# Patient Record
Sex: FEMALE | Race: BLACK OR AFRICAN AMERICAN | Employment: FULL TIME | RURAL
[De-identification: names, ages, dates, MRNs, and addresses within clinical notes are randomized per-mention and may not be internally consistent; named-entity substitution may affect disease eponyms.]

---

## 2017-01-18 ENCOUNTER — TELEPHONE (OUTPATIENT)
Dept: FAMILY MEDICINE CLINIC | Age: 37
End: 2017-01-18

## 2017-01-18 NOTE — TELEPHONE ENCOUNTER
Pt walked in and states has a bite with rash and wants it looked at made aware call for appt tomor but asking for now.

## 2017-01-19 NOTE — TELEPHONE ENCOUNTER
Looked at pt's bites/rash. Dr. Dereje Ferrer had expressed he could not see any more pts that was a non-emergency due to the time. It was 4:25 pm. Pt was offered all the other BS office numbers if she wanted to call. She was offered an appointment 1/19/2017 but said she would call other offices instead.

## 2017-03-06 ENCOUNTER — TELEPHONE (OUTPATIENT)
Dept: FAMILY MEDICINE CLINIC | Age: 37
End: 2017-03-06

## 2017-03-06 NOTE — TELEPHONE ENCOUNTER
Called pt and she is going to another office for treatment of allergy systems and ear ache. She stated she has \"tired everything\".

## 2017-03-06 NOTE — TELEPHONE ENCOUNTER
Pt would like to be worked been battleing for 1 week of ear pain runny nose .  i did make her aware she can call in a am to get same day appt but wanted to ask about today

## 2017-04-13 ENCOUNTER — OFFICE VISIT (OUTPATIENT)
Dept: FAMILY MEDICINE CLINIC | Age: 37
End: 2017-04-13

## 2017-04-13 ENCOUNTER — TELEPHONE (OUTPATIENT)
Dept: FAMILY MEDICINE CLINIC | Age: 37
End: 2017-04-13

## 2017-04-13 VITALS
DIASTOLIC BLOOD PRESSURE: 70 MMHG | WEIGHT: 150 LBS | OXYGEN SATURATION: 99 % | BODY MASS INDEX: 24.99 KG/M2 | TEMPERATURE: 97.9 F | HEIGHT: 65 IN | HEART RATE: 90 BPM | SYSTOLIC BLOOD PRESSURE: 118 MMHG | RESPIRATION RATE: 18 BRPM

## 2017-04-13 DIAGNOSIS — Z91.09 ENVIRONMENTAL ALLERGIES: Primary | ICD-10-CM

## 2017-04-13 DIAGNOSIS — F43.23 ADJUSTMENT REACTION WITH ANXIETY AND DEPRESSION: ICD-10-CM

## 2017-04-13 DIAGNOSIS — F40.10 SOCIAL ANXIETY DISORDER: ICD-10-CM

## 2017-04-13 RX ORDER — DULOXETIN HYDROCHLORIDE 20 MG/1
20 CAPSULE, DELAYED RELEASE ORAL DAILY
Qty: 30 CAP | Refills: 0 | Status: SHIPPED | OUTPATIENT
Start: 2017-04-13 | End: 2017-05-09

## 2017-04-13 RX ORDER — SERTRALINE HYDROCHLORIDE 50 MG/1
50 TABLET, FILM COATED ORAL DAILY
Qty: 30 TAB | Refills: 8 | Status: CANCELLED | OUTPATIENT
Start: 2017-04-13

## 2017-04-13 NOTE — MR AVS SNAPSHOT
Visit Information Date & Time Provider Department Dept. Phone Encounter #  
 4/13/2017  2:30 PM Sophia Swanson NP [facility] [address] [facility] [phone] [id number] Your Appointments 5/9/2017  4:00 PM  
COMPLETE PHYSICAL with Sophia Swanson NP  
[address] [place] ([address]) Appt Note: [address] [facility] [address] [address]  
[address] [facility] [address] [address] Upcoming Health Maintenance Date Due DTaP/Tdap/Td series (1 - Tdap) 12/21/1999 INFLUENZA AGE 9 TO ADULT 8/1/2016 PAP AKA CERVICAL CYTOLOGY 10/29/2018 Allergies as of 4/13/2017  Review Complete On: 4/13/2017 By: Sophia Swanson NP Severity Noted Reaction Type Reactions Azithromycin High 09/12/2012   Systemic Swelling  
 z pack Current Immunizations  Reviewed on 10/7/2012 Name Date Influenza Vaccine Split  Deferred (Patient Refused) Not reviewed this visit You Were Diagnosed With   
  
 Codes Comments Environmental allergies    -  Primary ICD-10-CM: Z91.09 
ICD-9-CM: V15.09 Adjustment reaction with anxiety and depression     ICD-10-CM: F43.23 
ICD-9-CM: 309.28 Social anxiety disorder     ICD-10-CM: F40.10 ICD-9-CM: 300.23 Vitals BP Pulse Temp Resp Height(growth percentile) Weight(growth percentile) 118/70 (BP 1 Location: Left arm, BP Patient Position: Sitting) 90 97.9 °F (36.6 °C) (Temporal) 18 5' 5\" (1.651 m) 150 lb (68 kg) LMP SpO2 BMI OB Status Smoking Status 04/13/2017 99% 24.96 kg/m2 Having regular periods Passive Smoke Exposure - Never Smoker Vitals History BMI and BSA Data Body Mass Index Body Surface Area 24.96 kg/m 2 1.77 m 2 Preferred Pharmacy Pharmacy Name Phone [address], [facility] [facility] [phone] Your Updated Medication List  
  
   
 This list is accurate as of: 4/13/17  4:12 PM.  Always use your most recent med list.  
  
  
  
  
 DULoxetine 20 mg capsule Commonly known as:  CYMBALTA Take 1 Cap by mouth daily. Indications: ANXIETY WITH DEPRESSION  
  
 fluticasone 50 mcg/actuation nasal spray Commonly known as:  Kanchan [place] 2 Sprays by Both Nostrils route daily. valACYclovir 1 gram tablet Commonly known as:  VALTREX  
2 tablets initially then 2 tablets q 12 hours as needed for lesion  Indications: HERPES LABIALIS  
  
 WOMEN'S ONE DAILY PO Take  by mouth daily. Prescriptions Sent to Pharmacy Refills DULoxetine (CYMBALTA) 20 mg capsule 0 Sig: Take 1 Cap by mouth daily. Indications: ANXIETY WITH DEPRESSION Class: Normal  
 Pharmacy: [address] Anjel Vickers Ph #: [phone] Route: Oral  
  
We Performed the Following REFERRAL TO ALLERGY [REF5 Custom] Comments:  
 Allergies failed OTC tx Referral Information Referral ID Referred By Referred To  
  
 [id number] ROMAINE, [facility] [place] [facility]. [address] [place] [address] Phone: [phone] Fax: [phone] Visits Status Start Date End Date 1 New Request 4/13/17 4/13/18 If your referral has a status of pending review or denied, additional information will be sent to support the outcome of this decision. Introducing [facility] & HEALTH SERVICES! Raú[employer] Olson introduces [employer] patient portal. Now you can access parts of your medical record, email your doctor's office, and request medication refills online. 1. In your internet browser, go to [email or web address] - Pay[employer] Anytime | Anywhere. [email or web address] - [employer] Anytime | Anywhere 2. Click on the First Time User? Click Here link in the Sign In box. You will see the New Member Sign Up page. 3. Enter your [employer] Access Code exactly as it appears below. You will not need to use this code after youve completed the sign-up process.  If you do not sign up before the expiration date, you must request a new code. · [employer] Access Code: [email or web address] Expires: 7/12/2017  4:12 PM 
 
4. Enter the last four digits of your Social Security Number (xxxx) and Date of Birth (mm/dd/yyyy) as indicated and click Submit. You will be taken to the next sign-up page. 5. Create a [employer] ID. This will be your [employer] login ID and cannot be changed, so think of one that is secure and easy to remember. 6. Create a [employer] password. You can change your password at any time. 7. Enter your Password Reset Question and Answer. This can be used at a later time if you forget your password. 8. Enter your e-mail address. You will receive e-mail notification when new information is available in [address] 9. Click Sign Up. You can now view and download portions of your medical record. 10. Click the Download Summary menu link to download a portable copy of your medical information. If you have questions, please visit the Frequently Asked Questions section of the [employer] website. Remember, [employer] is NOT to be used for urgent needs. For medical emergencies, dial 911. Now available from your iPhone and Android! Please provide this summary of care documentation to your next provider. Your primary care clinician is listed as Saulo Thompson. If you have any questions after today's visit, please call [phone].

## 2017-04-14 NOTE — PROGRESS NOTES
Subjective:     Joeann Favre is a 43 y.o. female who presents today with the following:  Chief Complaint   [facility]   [facility]   seasonal allergy symptoms. Watery ,, sneezing and cough. runny nose itchy eyes  No fever chills no exposure to strep or mono. No respiratory distress no history of asthma. Some ear fullness no pain. Has maxillary tenderness, anterior lymphadenopathy  Appetites good fluid intakes good      Social anxiety: Zoloft ineffective. Stopped taking a few days ago.            Problem list reviewed as part of this encounter.      ROS:  Gen: denies fever, chills, fatigue, weight loss, weight gain  HEENT:denies blurry vision, nasal congestion, sore throat  Resp: denies dypsnea, cough, wheezing  CV: denies chest pain radiating to the jaws or arms, palpitations, lower extremity edema  Abd: denies nausea, vomiting, diarrhea, constipation  Neuro: denies numbness/tingling  Endo: denies polyuria, polydipsia, heat/cold intolerance  Heme: no lymphadenopathy    Allergies   Allergen Reactions    Azithromycin Swelling     z pack         Current Outpatient Prescriptions:     DULoxetine (CYMBALTA) 20 mg capsule, Take 1 Cap by mouth daily. Indications: ANXIETY WITH DEPRESSION, Disp: 30 Cap, Rfl: 0    valACYclovir (VALTREX) 1 gram tablet, 2 tablets initially then 2 tablets q 12 hours as needed for lesion  Indications: HERPES LABIALIS, Disp: 24 Tab, Rfl: 4    fluticasone (FLONASE) 50 mcg/actuation nasal spray, 2 Sprays by Both Nostrils route daily. , Disp: 1 Bottle, Rfl: 5    MULTIVIT WITH CALCIUM,IRON,MIN (WOMEN'S ONE DAILY PO), Take  by mouth daily. , Disp: , Rfl:     Past Medical History:   Diagnosis Date    Anemia NEC     IRON DEF ANEMIA    HX OTHER MEDICAL     BELLS PALSY 2010, CARPEL TUNNEL RIGHT WRIST    Psychiatric problem     DEPRESSION, ANXIETY       Past Surgical History:   Procedure Laterality Date     DELIVERY ONLY      HX OTHER SURGICAL      HERNIA REPAIR (\"AS A CHILD\")       History   Smoking Status    Passive Smoke Exposure - Never Smoker    Types: Cigarettes   Smokeless Tobacco    Never Used     Comment:  smokes but not in the house       Social History     Social History    Marital status:      Spouse name: N/A    Number of children: N/A    Years of education: N/A     Social History Main Topics    Smoking status: Passive Smoke Exposure - Never Smoker     Types: Cigarettes    Smokeless tobacco: Never Used      Comment:  smokes but not in the house    Alcohol use Yes      Comment: social drinker    Drug use: No    Sexual activity: Yes     Partners: Male     Other Topics Concern    None     Social History Narrative       Family History   Problem Relation Age of Onset    Diabetes Mother     Hypertension Mother     Diabetes Maternal Grandmother     Hypertension Maternal Grandmother     Diabetes Maternal Grandfather     Hypertension Maternal Grandfather     Arthritis-osteo Father          Objective:     Visit Vitals    /70 (BP 1 Location: Left arm, BP Patient Position: Sitting)    Pulse 90    Temp 97.9 °F (36.6 °C) (Temporal)    Resp 18    Ht 5' 5\" (1.651 m)    Wt 150 lb (68 kg)    LMP 04/13/2017    SpO2 99%    BMI 24.96 kg/m2     Body mass index is 24.96 kg/(m^2). General: Alert and oriented. No acute distress. Well nourished  HEENT :  Ears:TMs are normal. Canals are clear. Eyes: pupils equal, round, react to light and accommodation. Extra ocular movements intact. Erythematous conjunctiva with rope like discharge from eyes. Nose: patent. pale and boggy, Mouth and throat is clear. Neck:supple full range of motion no thyromegaly. Trachea midline, No carotid bruits. No significant lymphadenopathy  Lungs[de-identified] clear to auscultation without wheezes, rales, or rhonchi. Heart :RRR, S1 & S2 are normal intensity.  No murmur; no gallop      Results for orders placed or performed in visit on 06/15/16   AMB POC RAPID STREP A   Result Value Ref Range    VALID INTERNAL CONTROL POC Yes     Group A Strep Ag Negative Negative       No results found for this visit on 04/13/17. Assessment/ Plan:     Gabriella Raza was seen today for allergies. Diagnoses and all orders for this visit:    Environmental allergies  -     REFERRAL TO ALLERGY    Adjustment reaction with anxiety and depression    Social anxiety disorder    Other orders  -     Cancel: sertraline (ZOLOFT) 50 mg tablet; Take 1 Tab by mouth daily.  -     DULoxetine (CYMBALTA) 20 mg capsule; Take 1 Cap by mouth daily. Indications: ANXIETY WITH DEPRESSION         1. Environmental allergies    2. Adjustment reaction with anxiety and depression    3. Social anxiety disorder        Orders Placed This Encounter    REFERRAL TO ALLERGY     Referral Priority:   Routine     Referral Type:   Consultation     Referral Reason:   Specialty Services Required     Referred to Provider:   Rian Brooks MD    DULoxetine (CYMBALTA) 20 mg capsule     Sig: Take 1 Cap by mouth daily. Indications: ANXIETY WITH DEPRESSION     Dispense:  30 Cap     Refill:  0     RTO in 3 weeks for medication evaluaion    Verbal and written instructions (see AVS) provided.  Patient expresses understanding of diagnosis and treatment plan.     ADEBAYO Caruso

## 2017-05-09 ENCOUNTER — OFFICE VISIT (OUTPATIENT)
Dept: FAMILY MEDICINE CLINIC | Age: 37
End: 2017-05-09

## 2017-05-09 VITALS
DIASTOLIC BLOOD PRESSURE: 80 MMHG | HEART RATE: 104 BPM | WEIGHT: 155 LBS | RESPIRATION RATE: 24 BRPM | SYSTOLIC BLOOD PRESSURE: 120 MMHG | BODY MASS INDEX: 25.83 KG/M2 | TEMPERATURE: 97.4 F | HEIGHT: 65 IN | OXYGEN SATURATION: 99 %

## 2017-05-09 DIAGNOSIS — Z00.00 ENCOUNTER FOR ANNUAL PHYSICAL EXAM: ICD-10-CM

## 2017-05-09 DIAGNOSIS — F41.9 ANXIETY: ICD-10-CM

## 2017-05-09 DIAGNOSIS — F32.A DEPRESSION, UNSPECIFIED DEPRESSION TYPE: Primary | ICD-10-CM

## 2017-05-09 RX ORDER — BUPROPION HYDROCHLORIDE 100 MG/1
100 TABLET, EXTENDED RELEASE ORAL DAILY
Qty: 30 TAB | Refills: 1 | Status: SHIPPED | OUTPATIENT
Start: 2017-05-09 | End: 2017-05-30 | Stop reason: SDUPTHER

## 2017-05-09 NOTE — PROGRESS NOTES
Subjective:     Meredith Elam is a 43 y.o. female who presents today with the following:  Chief Complaint   Patient presents with    Complete Physical    Behavioral Problem   Romina Caballero is a single mother process of separation, new job. Limited support from her parents. Presents today for evaluation and management of depression, anxiety and decrease in the ability to focus. She did not start Cymbalta due to the cost.     Discussed alternatives . She is receptive to starting Wellbutrin. Also discussed coping strategies including counseling. HM: Declined tetanus today. Followed by Dr. Shazia Devries for female exam.  Due to financial concerns she would like to postpone lab work til nxt visit. ROS:  Gen: denies fever, chills, fatigue, weight loss, weight gain  HEENT:denies blurry vision, nasal congestion, sore throat  Resp: denies dypsnea, cough, wheezing  CV: denies chest pain radiating to the jaws or arms, palpitations, lower extremity edema  Abd: denies nausea, vomiting, diarrhea, constipation  Neuro: denies numbness/tingling  Endo: denies polyuria, polydipsia, heat/cold intolerance  Heme: no lymphadenopathy    Allergies   Allergen Reactions    Azithromycin Swelling     z pack         Current Outpatient Prescriptions:     levonorgestrel (MIRENA) 20 mcg/24 hr (5 years) IUD, 1 Each by IntraUTERine route once., Disp: , Rfl:     buPROPion SR (WELLBUTRIN SR) 100 mg SR tablet, Take 1 Tab by mouth daily. Indications: ANXIETY WITH DEPRESSION, Disp: 30 Tab, Rfl: 1    valACYclovir (VALTREX) 1 gram tablet, 2 tablets initially then 2 tablets q 12 hours as needed for lesion  Indications: HERPES LABIALIS, Disp: 24 Tab, Rfl: 4    fluticasone (FLONASE) 50 mcg/actuation nasal spray, 2 Sprays by Both Nostrils route daily. , Disp: 1 Bottle, Rfl: 5    MULTIVIT WITH CALCIUM,IRON,MIN (WOMEN'S ONE DAILY PO), Take  by mouth daily. , Disp: , Rfl:     Past Medical History:   Diagnosis Date    Anemia NEC     IRON DEF ANEMIA    HX OTHER MEDICAL     BELLS PALSY 2010, CARPEL TUNNEL RIGHT WRIST    Psychiatric problem     DEPRESSION, ANXIETY       Past Surgical History:   Procedure Laterality Date     DELIVERY ONLY      HX OTHER SURGICAL      HERNIA REPAIR (\"AS A CHILD\")       History   Smoking Status    Passive Smoke Exposure - Never Smoker    Types: Cigarettes   Smokeless Tobacco    Never Used     Comment:  smokes but not in the house       Social History     Social History    Marital status:      Spouse name: N/A    Number of children: N/A    Years of education: N/A     Social History Main Topics    Smoking status: Passive Smoke Exposure - Never Smoker     Types: Cigarettes    Smokeless tobacco: Never Used      Comment:  smokes but not in the house    Alcohol use Yes      Comment: social drinker    Drug use: No    Sexual activity: Yes     Partners: Male     Other Topics Concern    None     Social History Narrative       Family History   Problem Relation Age of Onset    Diabetes Mother     Hypertension Mother     Diabetes Maternal Grandmother     Hypertension Maternal Grandmother     Diabetes Maternal Grandfather     Hypertension Maternal Grandfather     Arthritis-osteo Father          Objective:     Visit Vitals    /80 (BP 1 Location: Left arm, BP Patient Position: Sitting)    Pulse (!) 104    Temp 97.4 °F (36.3 °C) (Temporal)    Resp 24    Ht 5' 5\" (1.651 m)    Wt 155 lb (70.3 kg)    LMP 2017    SpO2 99%    BMI 25.79 kg/m2     Body mass index is 25.79 kg/(m^2). General: Alert and oriented. No acute distress. Well nourished  HEENT :  Ears:TMs are normal. Canals are clear. Eyes: pupils equal, round, react to light and accommodation. Extra ocular movements intact. Nose: patent. Mouth and throat is clear. Neck:supple full range of motion no thyromegaly. Trachea midline, No carotid bruits.  No significant lymphadenopathy  Lungs[de-identified] clear to auscultation without wheezes, rales, or rhonchi. Heart :RRR, S1 & S2 are normal intensity. No murmur; no gallop  Abdomen: bowel sounds active. No tenderness, guarding, rebound, masses, hepatic or spleen enlargement  Back: no CVA tenderness. Extremities: without clubbing, cyanosis, or edema  Pulses: radial and femoral pulses are normal  Neuro: HMF intact. Cranial nerves II through XII grossly normal.  Motor: is 5 over 5 and symmetrical.   Deep tendon reflexes: +2 equal    Results for orders placed or performed in visit on 06/15/16   AMB POC RAPID STREP A   Result Value Ref Range    VALID INTERNAL CONTROL POC Yes     Group A Strep Ag Negative Negative       No results found for this visit on 17. Assessment/ Plan:     Ben Taylor was seen today for complete physical and behavioral problem. Diagnoses and all orders for this visit:    Depression, unspecified depression type    Other orders  -     buPROPion SR (WELLBUTRIN SR) 100 mg SR tablet; Take 1 Tab by mouth daily. Indications: ANXIETY WITH DEPRESSION         1. Depression, unspecified depression type        Orders Placed This Encounter    levonorgestrel (MIRENA) 20 mcg/24 hr (5 years) IUD     Si Each by IntraUTERine route once.  buPROPion SR (WELLBUTRIN SR) 100 mg SR tablet     Sig: Take 1 Tab by mouth daily. Indications: ANXIETY WITH DEPRESSION     Dispense:  30 Tab     Refill:  1     RTO: in 3 weeks for medication review. Verbal and written instructions (see AVS) provided.  Patient expresses understanding of diagnosis and treatment plan.     Mohini Suero, ARACELYP-C

## 2017-05-09 NOTE — MR AVS SNAPSHOT
Visit Information Date & Time Provider Department Dept. Phone Encounter #  
 5/9/2017  4:00 PM Zac Payton NP [place] [id number] [facility] [phone] [id number] Upcoming Health Maintenance Date Due DTaP/Tdap/Td series (1 - Tdap) 12/21/1999 INFLUENZA AGE 9 TO ADULT 8/1/2017 PAP AKA CERVICAL CYTOLOGY 10/29/2018 Allergies as of 5/9/2017  Review Complete On: 5/9/2017 By: Zac Payton NP Severity Noted Reaction Type Reactions Azithromycin High 09/12/2012   Systemic Swelling  
 z pack Current Immunizations  Reviewed on 10/7/2012 Name Date Influenza Vaccine Split  Deferred (Patient Refused) Not reviewed this visit You Were Diagnosed With   
  
 Codes Comments Depression, unspecified depression type    -  Primary ICD-10-CM: F32.9 ICD-9-CM: 544 Vitals BP Pulse Temp Resp Height(growth percentile) Weight(growth percentile) 120/80 (BP 1 Location: Left arm, BP Patient Position: Sitting) (!) 104 97.4 °F (36.3 °C) (Temporal) 24 5' 5\" (1.651 m) 155 lb (70.3 kg) LMP SpO2 BMI OB Status Smoking Status 04/13/2017 99% 25.79 kg/m2 IUD Passive Smoke Exposure - Never Smoker BMI and BSA Data Body Mass Index Body Surface Area 25.79 kg/m 2 1.8 m 2 Preferred Pharmacy Pharmacy Name Phone [address] Theo [facility] [phone] Your Updated Medication List  
  
   
This list is accurate as of: 5/9/17  4:56 PM.  Always use your most recent med list.  
  
  
  
  
 buPROPion  mg SR tablet Commonly known as:  Olman Alvares Take 1 Tab by mouth daily. Indications: ANXIETY WITH DEPRESSION  
  
 fluticasone 50 mcg/actuation nasal spray Commonly known as:  Kari Millerson 2 Sprays by Both Nostrils route daily. MIRENA 20 mcg/24 hr (5 years) IUD Generic drug:  levonorgestrel 1 Each by IntraUTERine route once. valACYclovir 1 gram tablet Commonly known as:  VALTREX  
2 tablets initially then 2 tablets q 12 hours as needed for lesion  Indications: HERPES LABIALIS  
  
 WOMEN'S ONE DAILY PO Take  by mouth daily. Prescriptions Sent to Pharmacy Refills buPROPion SR (WELLBUTRIN SR) 100 mg SR tablet 1 Sig: Take 1 Tab by mouth daily. Indications: ANXIETY WITH DEPRESSION Class: Normal  
 Pharmacy: [address] [place] [facility] Ph #: [phone] Route: Oral  
  
Introducing [facility]! [employer] introduces [employer] patient portal. Now you can access parts of your medical record, email your doctor's office, and request medication refills online. 1. In your internet browser, go to [email or web address] 2. Click on the First Time User? Click Here link in the Sign In box. You will see the New Member Sign Up page. 3. Enter your [employer] Access Code exactly as it appears below. You will not need to use this code after youve completed the sign-up process. If you do not sign up before the expiration date, you must request a new code. · [employer] Access Code: [email or web address] Expires: 7/12/2017  4:12 PM 
 
4. Enter the last four digits of your Social Security Number (xxxx) and Date of Birth (mm/dd/yyyy) as indicated and click Submit. You will be taken to the next sign-up page. 5. Create a [employer] ID. This will be your [employer] login ID and cannot be changed, so think of one that is secure and easy to remember. 6. Create a [employer] password. You can change your password at any time. 7. Enter your Password Reset Question and Answer. This can be used at a later time if you forget your password. 8. Enter your e-mail address. You will receive e-mail notification when new information is available in [address] 9. Click Sign Up. You can now view and download portions of your medical record.  
10. Click the Download Summary menu link to download a portable copy of your medical information. If you have questions, please visit the Frequently Asked Questions section of the [employer] website. Remember, [employer] is NOT to be used for urgent needs. For medical emergencies, dial 911. Now available from your iPhone and Android! Please provide this summary of care documentation to your next provider. Your primary care clinician is listed as uJng Delgado. If you have any questions after today's visit, please call [phone].

## 2017-05-30 ENCOUNTER — OFFICE VISIT (OUTPATIENT)
Dept: FAMILY MEDICINE CLINIC | Age: 37
End: 2017-05-30

## 2017-05-30 VITALS
SYSTOLIC BLOOD PRESSURE: 110 MMHG | RESPIRATION RATE: 18 BRPM | DIASTOLIC BLOOD PRESSURE: 70 MMHG | TEMPERATURE: 98 F | HEART RATE: 104 BPM | WEIGHT: 157 LBS | BODY MASS INDEX: 26.16 KG/M2 | HEIGHT: 65 IN

## 2017-05-30 DIAGNOSIS — J30.89 ENVIRONMENTAL AND SEASONAL ALLERGIES: Primary | ICD-10-CM

## 2017-05-30 RX ORDER — BUPROPION HYDROCHLORIDE 150 MG/1
150 TABLET, EXTENDED RELEASE ORAL DAILY
Qty: 30 TAB | Refills: 1 | Status: SHIPPED | OUTPATIENT
Start: 2017-05-30 | End: 2017-07-06 | Stop reason: SDUPTHER

## 2017-05-30 RX ORDER — LEVOCETIRIZINE DIHYDROCHLORIDE 5 MG/1
TABLET, FILM COATED ORAL
COMMUNITY
End: 2017-11-24 | Stop reason: SDUPTHER

## 2017-05-30 NOTE — MR AVS SNAPSHOT
Visit Information Date & Time Provider Department Dept. Phone Encounter #  
 5/30/2017  4:00 PM Mile Joyner NP [address] [phone] [id number] Follow-up Instructions Return in about 4 months (around 9/30/2017). Upcoming Health Maintenance Date Due DTaP/Tdap/Td series (1 - Tdap) 12/21/1999 INFLUENZA AGE 9 TO ADULT 8/1/2017 PAP AKA CERVICAL CYTOLOGY 10/29/2018 Allergies as of 5/30/2017  Review Complete On: 5/30/2017 By: Kelvin Boast, RN Severity Noted Reaction Type Reactions Azithromycin High 09/12/2012   Systemic Swelling  
 z pack Current Immunizations  Reviewed on 10/7/2012 Name Date Influenza Vaccine Split  Deferred (Patient Refused) Not reviewed this visit You Were Diagnosed With   
  
 Codes Comments Environmental and seasonal allergies    -  Primary ICD-10-CM: J30.89 ICD-9-CM: 477.8 Vitals BP Pulse Temp Resp Height(growth percentile) Weight(growth percentile) 110/70 (BP 1 Location: Left arm, BP Patient Position: Sitting) (!) 104 98 °F (36.7 °C) (Temporal) 18 5' 5\" (1.651 m) 157 lb (71.2 kg) BMI OB Status Smoking Status 26.13 kg/m2 IUD Passive Smoke Exposure - Never Smoker Vitals History BMI and BSA Data Body Mass Index Body Surface Area  
 26.13 kg/m 2 1.81 m 2 Preferred Pharmacy Pharmacy Name Phone [address], [address] [place] [address] Theo Patel Alicja [phone] Your Updated Medication List  
  
   
This list is accurate as of: 5/30/17  4:39 PM.  Always use your most recent med list.  
  
  
  
  
 buPROPion  mg SR tablet Commonly known as:  Trung Spence Take 1 Tab by mouth daily. Indications: ANXIETY WITH DEPRESSION  
  
 fluticasone 50 mcg/actuation nasal spray Commonly known as:  Mica Muniz 2 Sprays by Both Nostrils route daily. levocetirizine 5 mg tablet Commonly known as:  Eliza Zafar  
 Take  by mouth. MIRENA 20 mcg/24 hr (5 years) IUD Generic drug:  levonorgestrel 1 Each by IntraUTERine route once. valACYclovir 1 gram tablet Commonly known as:  VALTREX  
2 tablets initially then 2 tablets q 12 hours as needed for lesion  Indications: HERPES LABIALIS  
  
 WOMEN'S ONE DAILY PO Take  by mouth daily. Prescriptions Sent to Pharmacy Refills buPROPion SR (WELLBUTRIN SR) 150 mg SR tablet 1 Sig: Take 1 Tab by mouth daily. Indications: ANXIETY WITH DEPRESSION Class: Normal  
 Pharmacy: [address], [address] Theo Robbins Ph #: [phone] Route: Oral  
  
Follow-up Instructions Return in about 4 months (around 9/30/2017). Introducing [facility] & HEALTH SERVICES! [employer] introduces [employer] patient portal. Now you can access parts of your medical record, email your doctor's office, and request medication refills online. 1. In your internet browser, go to [email or web address] 2. Click on the First Time User? Click Here link in the Sign In box. You will see the New Member Sign Up page. 3. Enter your [employer] Access Code exactly as it appears below. You will not need to use this code after youve completed the sign-up process. If you do not sign up before the expiration date, you must request a new code. · [employer] Access Code: [email or web address] Expires: 7/12/2017  4:12 PM 
 
4. Enter the last four digits of your Social Security Number (xxxx) and Date of Birth (mm/dd/yyyy) as indicated and click Submit. You will be taken to the next sign-up page. 5. Create a UpTapt ID. This will be your [employer] login ID and cannot be changed, so think of one that is secure and easy to remember. 6. Create a [employer] password. You can change your password at any time. 7. Enter your Password Reset Question and Answer. This can be used at a later time if you forget your password. 8. Enter your e-mail address. You will receive e-mail notification when new information is available in [address] 9. Click Sign Up. You can now view and download portions of your medical record. 10. Click the Download Summary menu link to download a portable copy of your medical information. If you have questions, please visit the Frequently Asked Questions section of the EcoSurge website. Remember, [employer] is NOT to be used for urgent needs. For medical emergencies, dial 911. Now available from your iPhone and Android! Please provide this summary of care documentation to your next provider. Your primary care clinician is listed as Vidya Hidalgo. If you have any questions after today's visit, please call [phone].

## 2017-05-31 NOTE — PROGRESS NOTES
Subjective:     Madiha Jones is a 43 y.o. female who presents today with the following:  Chief Complaint   Patient presents with    Depression   [facility] works at [employer] in [place]. Started on Wellbutrin a few weeks ago. Noted increase in attention span and focusing on her job. Still has feelings of impatience and argumentative. No SE. Discussed increasing the dosage to 150 mg. She is in agreement. Seasonal allergies: followed by allergist.  Taking xyzal with OTC remedies for symptom relief. Encouraged her to notify specialist regarding OTC in conjunction with rx. ROS:  Gen: denies fever, chills, fatigue, weight loss, weight gain  HEENT:denies blurry vision, nasal congestion, sore throat  Resp: denies dypsnea, cough, wheezing  CV: denies chest pain radiating to the jaws or arms, palpitations, lower extremity edema  Abd: denies nausea, vomiting, diarrhea, constipation  Neuro: denies numbness/tingling  Endo: denies polyuria, polydipsia, heat/cold intolerance  Heme: no lymphadenopathy    Allergies   Allergen Reactions    Azithromycin Swelling     z pack         Current Outpatient Prescriptions:     levocetirizine (XYZAL) 5 mg tablet, Take  by mouth., Disp: , Rfl:     buPROPion SR (WELLBUTRIN SR) 150 mg SR tablet, Take 1 Tab by mouth daily. Indications: ANXIETY WITH DEPRESSION, Disp: 30 Tab, Rfl: 1    levonorgestrel (MIRENA) 20 mcg/24 hr (5 years) IUD, 1 Each by IntraUTERine route once., Disp: , Rfl:     valACYclovir (VALTREX) 1 gram tablet, 2 tablets initially then 2 tablets q 12 hours as needed for lesion  Indications: HERPES LABIALIS, Disp: 24 Tab, Rfl: 4    fluticasone (FLONASE) 50 mcg/actuation nasal spray, 2 Sprays by Both Nostrils route daily. , Disp: 1 Bottle, Rfl: 5    MULTIVIT WITH CALCIUM,IRON,MIN (WOMEN'S ONE DAILY PO), Take  by mouth daily. , Disp: , Rfl:     Past Medical History:   Diagnosis Date    Anemia NEC     IRON DEF ANEMIA    HX OTHER MEDICAL BELLS PALSY 2010, CARPEL TUNNEL RIGHT WRIST    Psychiatric problem     DEPRESSION, ANXIETY       Past Surgical History:   Procedure Laterality Date     DELIVERY ONLY      HX OTHER SURGICAL      HERNIA REPAIR (\"AS A CHILD\")       History   Smoking Status    Passive Smoke Exposure - Never Smoker    Types: Cigarettes   Smokeless Tobacco    Never Used     Comment:  smokes but not in the house       Social History     Social History    Marital status:      Spouse name: N/A    Number of children: N/A    Years of education: N/A     Social History Main Topics    Smoking status: Passive Smoke Exposure - Never Smoker     Types: Cigarettes    Smokeless tobacco: Never Used      Comment:  smokes but not in the house    Alcohol use Yes      Comment: social drinker    Drug use: No    Sexual activity: Yes     Partners: Male     Other Topics Concern    None     Social History Narrative       Family History   Problem Relation Age of Onset    Diabetes Mother     Hypertension Mother     Diabetes Maternal Grandmother     Hypertension Maternal Grandmother     Diabetes Maternal Grandfather     Hypertension Maternal Grandfather     Arthritis-osteo Father          Objective:     Visit Vitals    /70 (BP 1 Location: Left arm, BP Patient Position: Sitting)    Pulse (!) 104    Temp 98 °F (36.7 °C) (Temporal)    Resp 18    Ht 5' 5\" (1.651 m)    Wt 157 lb (71.2 kg)    BMI 26.13 kg/m2     Body mass index is 26.13 kg/(m^2). General: Alert and oriented. No acute distress. Well nourished  HEENT :  Ears:TMs are normal. Canals are clear. Eyes: pupils equal, round, react to light and accommodation. Extra ocular movements intact. Nose: patent. Mouth and throat is clear. Neck:supple full range of motion no thyromegaly. Trachea midline, No carotid bruits. No significant lymphadenopathy  Lungs[de-identified] clear to auscultation without wheezes, rales, or rhonchi.   Heart :RRR, S1 & S2 are normal intensity. No murmur; no gallop            No results found for this visit on 05/30/17. Assessment/ Plan:     Antonieta Walton was seen today for depression. Diagnoses and all orders for this visit:    Environmental and seasonal allergies    Other orders  -     buPROPion SR (WELLBUTRIN SR) 150 mg SR tablet; Take 1 Tab by mouth daily. Indications: ANXIETY WITH DEPRESSION         1. Environmental and seasonal allergies        Orders Placed This Encounter    levocetirizine (XYZAL) 5 mg tablet     Sig: Take  by mouth.  buPROPion SR (WELLBUTRIN SR) 150 mg SR tablet     Sig: Take 1 Tab by mouth daily. Indications: ANXIETY WITH DEPRESSION     Dispense:  30 Tab     Refill:  1     RTO in 4 months or sooner as needed. Discussed follow up phone call to report on medication prior to next  refill. Verbal and written instructions (see AVS) provided.  Patient expresses understanding of diagnosis and treatment plan.     ADEBAYO Holcomb

## 2017-06-15 ENCOUNTER — TELEPHONE (OUTPATIENT)
Dept: FAMILY MEDICINE CLINIC | Age: 37
End: 2017-06-15

## 2017-06-15 RX ORDER — AMOXICILLIN 500 MG/1
500 CAPSULE ORAL 3 TIMES DAILY
Qty: 30 CAP | Refills: 0 | Status: SHIPPED | OUTPATIENT
Start: 2017-06-15 | End: 2017-09-26 | Stop reason: ALTCHOICE

## 2017-06-15 NOTE — TELEPHONE ENCOUNTER
Spoke with patient. Seen by Wendy Fox 2 weeks ago for same kind of symptoms. Complaining of pain full gums. Has appointment with dentist on Monday. Asking if antibiotic can be called into [facility].

## 2017-07-06 ENCOUNTER — TELEPHONE (OUTPATIENT)
Dept: FAMILY MEDICINE CLINIC | Age: 37
End: 2017-07-06

## 2017-07-06 ENCOUNTER — CLINICAL SUPPORT (OUTPATIENT)
Dept: FAMILY MEDICINE CLINIC | Age: 37
End: 2017-07-06

## 2017-07-06 DIAGNOSIS — J30.1 NON-SEASONAL ALLERGIC RHINITIS DUE TO POLLEN: ICD-10-CM

## 2017-07-06 DIAGNOSIS — Z51.6 DESENSITIZATION TO ALLERGENS: ICD-10-CM

## 2017-07-06 RX ORDER — BUPROPION HYDROCHLORIDE 200 MG/1
200 TABLET, EXTENDED RELEASE ORAL DAILY
Qty: 90 TAB | Refills: 1 | Status: SHIPPED | OUTPATIENT
Start: 2017-07-06 | End: 2017-09-26 | Stop reason: SDUPTHER

## 2017-07-06 NOTE — TELEPHONE ENCOUNTER
Patient would like to increase her Wellbutrin to 200 mg. She said Vargas Bajwa discussed the increase with her. Eduardo.

## 2017-07-06 NOTE — PROGRESS NOTES
Dave Loyd is a 43 y.o. female presenting for/with: Allergy Injection    S: pt in for allergy shot  O: There were no vitals taken for this visit. WDWN patient in no acute distress    A: allergy shot given per protocol. Pt observed [id number], no complications or abnormal reaction. Looking well and has no complaints. P: pt is due in 1 Weeks for next injection.

## 2017-07-06 NOTE — MR AVS SNAPSHOT
Visit Information Date & Time Provider Department Dept. Phone Encounter #  
 7/6/2017  3:45 PM [address] [facility] [phone] [id number] Your Appointments 9/28/2017  4:00 PM  
ESTABLISHED PATIENT with Jose Raza NP  
[address] ([facility]) Appt Note: 4 mo F/U  
 [address] [address]  
[address] [facility] [address] [address] Upcoming Health Maintenance Date Due DTaP/Tdap/Td series (1 - Tdap) 12/21/1999 INFLUENZA AGE 9 TO ADULT 8/1/2017 PAP AKA CERVICAL CYTOLOGY 10/29/2018 Allergies as of 7/6/2017  Review Complete On: 5/31/2017 By: Jose Raza NP Severity Noted Reaction Type Reactions Azithromycin High 09/12/2012   Systemic Swelling  
 z pack Current Immunizations  Reviewed on 10/7/2012 Name Date Influenza Vaccine Split  Deferred (Patient Refused) Not reviewed this visit Vitals OB Status Smoking Status IUD Passive Smoke Exposure - Never Smoker Your Updated Medication List  
  
   
This list is accurate as of: 7/6/17  3:51 PM.  Always use your most recent med list.  
  
  
  
  
 amoxicillin 500 mg capsule Commonly known as:  AMOXIL Take 1 Cap by mouth three (3) times daily. buPROPion  mg SR tablet Commonly known as:  Cathren [place] Take 1 Tab by mouth daily. Indications: ANXIETY WITH DEPRESSION  
  
 fluticasone 50 mcg/actuation nasal spray Commonly known as:  Fabiana Haknins 2 Sprays by Both Nostrils route daily. levocetirizine 5 mg tablet Commonly known as:  Pervis [place] Take  by mouth. MIRENA 20 mcg/24 hr (5 years) IUD Generic drug:  levonorgestrel 1 Each by IntraUTERine route once. valACYclovir 1 gram tablet Commonly known as:  VALTREX  
2 tablets initially then 2 tablets q 12 hours as needed for lesion Indications: HERPES LABIALIS  
  
 WOMEN'S ONE DAILY PO Take  by mouth daily. Introducing [facility] & HEALTH SERVICES! [facility] introduces [employer] patient portal. Now you can access parts of your medical record, email your doctor's office, and request medication refills online. 1. In your internet browser, go to [email or web address] 2. Click on the First Time User? Click Here link in the Sign In box. You will see the New Member Sign Up page. 3. Enter your PlayhouseS[employer] Access Code exactly as it appears below. You will not need to use this code after youve completed the sign-up process. If you do not sign up before the expiration date, you must request a new code. · Play[employer] Access Code: [email or web address] Expires: 7/12/2017  4:12 PM 
 
4. Enter the last four digits of your Social Security Number (xxxx) and Date of Birth (mm/dd/yyyy) as indicated and click Submit. You will be taken to the next sign-up page. 5. Create a PlayhouseSquare ID. This will be your PlayhouseS[employer] login ID and cannot be changed, so think of one that is secure and easy to remember. 6. Create a PlayhouseSquare password. You can change your password at any time. 7. Enter your Password Reset Question and Answer. This can be used at a later time if you forget your password. 8. Enter your e-mail address. You will receive e-mail notification when new information is available in [address] 9. Click Sign Up. You can now view and download portions of your medical record. 10. Click the Download Summary menu link to download a portable copy of your medical information. If you have questions, please visit the Frequently Asked Questions section of the PlayhouseSquare website. Remember, PlayhouseSquare is NOT to be used for urgent needs. For medical emergencies, dial 911. Now available from your iPhone and Android! Please provide this summary of care documentation to your next provider. Your primary care clinician is listed as Lilia Antonio. If you have any questions after today's visit, please call [phone].

## 2017-07-11 ENCOUNTER — CLINICAL SUPPORT (OUTPATIENT)
Dept: FAMILY MEDICINE CLINIC | Age: 37
End: 2017-07-11

## 2017-07-11 DIAGNOSIS — Z51.6 DESENSITIZATION TO ALLERGENS: ICD-10-CM

## 2017-07-11 DIAGNOSIS — J30.1 NON-SEASONAL ALLERGIC RHINITIS DUE TO POLLEN: ICD-10-CM

## 2017-07-11 NOTE — PROGRESS NOTES
Brenton Rush is a 43 y.o. female presenting for/with: Allergy Injection    S: pt in for allergy shot  O: There were no vitals taken for this visit. WDWN patient in no acute distress    A: allergy shot given per protocol. Pt observed [id number], no complications or abnormal reaction. Looking well and has no complaints. P: pt is due in 1 Weeks for next injection. As per orders of Jeff Nurse, an injection of allergy serum was given. She was asked to report any reaction prior to leaving the clinic. Macario Limon LPN     Previous Allergy Injection  No flowsheet data found.    Allergy Flowsheet

## 2017-07-11 NOTE — MR AVS SNAPSHOT
Visit Information Date & Time Provider Department Dept. Phone Encounter #  
 7/11/2017  3:45 PM [address] [place] [phone] [id number] Your Appointments 9/28/2017  4:00 PM  
ESTABLISHED PATIENT with JAYNA Monjocelyn Lisa ([facility]) Appt Note: 4 mo F/U  
 [address] [address]  
[address] [facility] [address] [address] Upcoming Health Maintenance Date Due DTaP/Tdap/Td series (1 - Tdap) 12/21/1999 INFLUENZA AGE 9 TO ADULT 8/1/2017 PAP AKA CERVICAL CYTOLOGY 10/29/2018 Allergies as of 7/11/2017  Review Complete On: 5/31/2017 By: Sidney Hutchins NP Severity Noted Reaction Type Reactions Azithromycin High 09/12/2012   Systemic Swelling  
 z pack Current Immunizations  Reviewed on 10/7/2012 Name Date Influenza Vaccine Split  Deferred (Patient Refused) Not reviewed this visit Vitals OB Status Smoking Status IUD Passive Smoke Exposure - Never Smoker Preferred Pharmacy Pharmacy Name Phone [address], [address] [place] [address] Theo Webb [phone] Your Updated Medication List  
  
   
This list is accurate as of: 7/11/17  3:58 PM.  Always use your most recent med list.  
  
  
  
  
 amoxicillin 500 mg capsule Commonly known as:  AMOXIL Take 1 Cap by mouth three (3) times daily. buPROPion  mg SR tablet Commonly known as:  Mark Patch Take 1 Tab by mouth daily. Indications: ANXIETY WITH DEPRESSION  
  
 fluticasone 50 mcg/actuation nasal spray Commonly known as:  Nikolay Suzie 2 Sprays by Both Nostrils route daily. levocetirizine 5 mg tablet Commonly known as:  Fredrica Ervin Take  by mouth. MIRENA 20 mcg/24 hr (5 years) IUD Generic drug:  levonorgestrel 1 Each by IntraUTERine route once. valACYclovir 1 gram tablet Commonly known as:  VALTREX  
2 tablets initially then 2 tablets q 12 hours as needed for lesion  Indications: HERPES LABIALIS  
  
 WOMEN'S ONE DAILY PO Take  by mouth daily. Introducing [facility] & HEALTH SERVICES! [employer] introduces LOAG patient portal. Now you can access parts of your medical record, email your doctor's office, and request medication refills online. 1. In your internet browser, go to [email or web address] 2. Click on the First Time User? Click Here link in the Sign In box. You will see the New Member Sign Up page. 3. Enter your LOAG Access Code exactly as it appears below. You will not need to use this code after youve completed the sign-up process. If you do not sign up before the expiration date, you must request a new code. · LOAG Access Code: [email or web address] Expires: 7/12/2017  4:12 PM 
 
4. Enter the last four digits of your Social Security Number (xxxx) and Date of Birth (mm/dd/yyyy) as indicated and click Submit. You will be taken to the next sign-up page. 5. Create a LOAG ID. This will be your LOAG login ID and cannot be changed, so think of one that is secure and easy to remember. 6. Create a LOAG password. You can change your password at any time. 7. Enter your Password Reset Question and Answer. This can be used at a later time if you forget your password. 8. Enter your e-mail address. You will receive e-mail notification when new information is available in [address] 9. Click Sign Up. You can now view and download portions of your medical record. 10. Click the Download Summary menu link to download a portable copy of your medical information. If you have questions, please visit the Frequently Asked Questions section of the LOAG website. Remember, LOAG is NOT to be used for urgent needs. For medical emergencies, dial 911. Now available from your iPhone and Android! Please provide this summary of care documentation to your next provider. Your primary care clinician is listed as Anton Rhodes. If you have any questions after today's visit, please call [phone].

## 2017-07-20 ENCOUNTER — CLINICAL SUPPORT (OUTPATIENT)
Dept: FAMILY MEDICINE CLINIC | Age: 37
End: 2017-07-20

## 2017-07-20 DIAGNOSIS — Z51.6 DESENSITIZATION TO ALLERGENS: ICD-10-CM

## 2017-07-20 DIAGNOSIS — J30.1 NON-SEASONAL ALLERGIC RHINITIS DUE TO POLLEN: ICD-10-CM

## 2017-07-20 NOTE — PROGRESS NOTES
Debbi Marie is a 43 y.o. female presenting for/with: Allergy Injection    S: pt in for allergy shot  O: There were no vitals taken for this visit. WDWN patient in no acute distress    A: allergy shot given per protocol. Pt observed [id number], no complications or abnormal reaction. Looking well and has no complaints. P: pt is due in 1 Weeks for next injection.

## 2017-07-20 NOTE — MR AVS SNAPSHOT
Visit Information Date & Time Provider Department Dept. Phone Encounter #  
 7/20/2017  1:30 PM [address] [phone] [id number] Your Appointments 7/20/2017  1:30 PM  
Nurse Visit with [address] ([address]) Appt Note: Allergy shot  
 [address] [address]  
[phone]  
  
   
 [address] [address]  
  
    
 9/28/2017  4:00 PM  
ESTABLISHED PATIENT with Raphael Forman NP  
[address] ([address]) Appt Note: 4 mo F/U  
 [address] [address]  
[address] [facility] [address] [address] Upcoming Health Maintenance Date Due DTaP/Tdap/Td series (1 - Tdap) 12/21/1999 INFLUENZA AGE 9 TO ADULT 8/1/2017 PAP AKA CERVICAL CYTOLOGY 10/29/2018 Allergies as of 7/20/2017  Review Complete On: 5/31/2017 By: Raphael Forman NP Severity Noted Reaction Type Reactions Azithromycin High 09/12/2012   Systemic Swelling  
 z pack Current Immunizations  Reviewed on 10/7/2012 Name Date Influenza Vaccine Split  Deferred (Patient Refused) Not reviewed this visit Vitals OB Status Smoking Status IUD Passive Smoke Exposure - Never Smoker Preferred Pharmacy Pharmacy Name Phone [address] Theo Stewartr [phone] Your Updated Medication List  
  
   
This list is accurate as of: 7/20/17  1:14 PM.  Always use your most recent med list.  
  
  
  
  
 amoxicillin 500 mg capsule Commonly known as:  AMOXIL Take 1 Cap by mouth three (3) times daily. buPROPion  mg SR tablet Commonly known as:  Deja Hock Take 1 Tab by mouth daily.  Indications: ANXIETY WITH DEPRESSION  
  
 fluticasone 50 mcg/actuation nasal spray Commonly known as:  Jeanne Sale 2 Sprays by Both Nostrils route daily. levocetirizine 5 mg tablet Commonly known as:  Louis [place] Take  by mouth. MIRENA 20 mcg/24 hr (5 years) IUD Generic drug:  levonorgestrel 1 Each by IntraUTERine route once. valACYclovir 1 gram tablet Commonly known as:  VALTREX  
2 tablets initially then 2 tablets q 12 hours as needed for lesion  Indications: HERPES LABIALIS  
  
 WOMEN'S ONE DAILY PO Take  by mouth daily. Introducing \[facility]\"" & HEALTH SERVICES! Briseyda Smith introduces [employer] patient portal. Now you can access parts of your medical record, email your doctor's office, and request medication refills online. 1. In your internet browser, go to [email or web address] 2. Click on the First Time User? Click Here link in the Sign In box. You will see the New Member Sign Up page. 3. Enter your [employer] Access Code exactly as it appears below. You will not need to use this code after youve completed the sign-up process. If you do not sign up before the expiration date, you must request a new code. · EV[employer] Access Code: [email or web address] Expires: 10/18/2017  1:14 PM 
 
4. Enter the last four digits of your Social Security Number (xxxx) and Date of Birth (mm/dd/yyyy) as indicated and click Submit. You will be taken to the next sign-up page. 5. Create a EVOFEM ID. This will be your EV[employer] login ID and cannot be changed, so think of one that is secure and easy to remember. 6. Create a EV[employer]EM password. You can change your password at any time. 7. Enter your Password Reset Question and Answer. This can be used at a later time if you forget your password. 8. Enter your e-mail address. You will receive e-mail notification when new information is available in [address] 9. Click Sign Up. You can now view and download portions of your medical record. 10. Click the Download Summary menu link to download a portable copy of your medical information. If you have questions, please visit the Frequently Asked Questions section of the mydeco website. Remember, [employer]deco is NOT to be used for urgent needs. For medical emergencies, dial 911. Now available from your iPhone and Android! Please provide this summary of care documentation to your next provider. Your primary care clinician is listed as Elisa Daniel. If you have any questions after today's visit, please call [phone].

## 2017-07-27 ENCOUNTER — CLINICAL SUPPORT (OUTPATIENT)
Dept: FAMILY MEDICINE CLINIC | Age: 37
End: 2017-07-27

## 2017-07-27 DIAGNOSIS — Z51.6 DESENSITIZATION TO ALLERGENS: ICD-10-CM

## 2017-07-27 DIAGNOSIS — J30.1 NON-SEASONAL ALLERGIC RHINITIS DUE TO POLLEN: ICD-10-CM

## 2017-07-27 NOTE — MR AVS SNAPSHOT
Visit Information Date & Time Provider Department Dept. Phone Encounter #  
 7/27/2017  4:15 PM [address] [phone] [id number] Your Appointments 9/28/2017  4:00 PM  
ESTABLISHED PATIENT with Raphael Forman NP  
[address] [place] ([address]) Appt Note: 4 mo F/U  
 [address] [place] [address] [address]  
30[address] [facility] [address] [address] Upcoming Health Maintenance Date Due DTaP/Tdap/Td series (1 - Tdap) 12/21/1999 INFLUENZA AGE 9 TO ADULT 8/1/2017 PAP AKA CERVICAL CYTOLOGY 10/29/2018 Allergies as of 7/27/2017  Review Complete On: 5/31/2017 By: Raphael Forman NP Severity Noted Reaction Type Reactions Azithromycin High 09/12/2012   Systemic Swelling  
 z pack Current Immunizations  Reviewed on 10/7/2012 Name Date Influenza Vaccine Split  Deferred (Patient Refused) Not reviewed this visit You Were Diagnosed With   
  
 Codes Comments Non-seasonal allergic rhinitis due to pollen     ICD-10-CM: J30.1 ICD-9-CM: 477.0 Desensitization to allergens     ICD-10-CM: Z51.6 ICD-9-CM: V07.1 Vitals OB Status Smoking Status IUD Passive Smoke Exposure - Never Smoker Preferred Pharmacy Pharmacy Name Phone [address], [address] [place] [address] Theo Stewartr [phone] Your Updated Medication List  
  
   
This list is accurate as of: 7/27/17  4:27 PM.  Always use your most recent med list.  
  
  
  
  
 amoxicillin 500 mg capsule Commonly known as:  AMOXIL Take 1 Cap by mouth three (3) times daily. buPROPion  mg SR tablet Commonly known as:  Deja Hock Take 1 Tab by mouth daily. Indications: ANXIETY WITH DEPRESSION  
  
 fluticasone 50 mcg/actuation nasal spray Commonly known as:  Linwood Leos 2 Sprays by Both Nostrils route daily. levocetirizine 5 mg tablet Commonly known as:  Alethea Budds Take  by mouth. MIRENA 20 mcg/24 hr (5 years) IUD Generic drug:  levonorgestrel 1 Each by IntraUTERine route once. valACYclovir 1 gram tablet Commonly known as:  VALTREX  
2 tablets initially then 2 tablets q 12 hours as needed for lesion  Indications: HERPES LABIALIS  
  
 WOMEN'S ONE DAILY PO Take  by mouth daily. We Performed the Following [place] IMMUNOTHERAPY, 2+ INJECTIONS [id number] CPT(R)] Introducing [facility] SERVICES! Madison De La Garza introduces Amplitude patient portal. Now you can access parts of your medical record, email your doctor's office, and request medication refills online. 1. In your internet browser, go to [email or web address]/[email or web address] [email or web address] 2. Click on the First Time User? Click Here link in the Sign In box. You will see the New Member Sign Up page. 3. Enter your Amplitude Access Code exactly as it appears below. You will not need to use this code after youve completed the sign-up process. If you do not sign up before the expiration date, you must request a new code. · Amplitude Access Code: [email or web address] Expires: 10/18/2017  1:14 PM 
 
4. Enter the last four digits of your Social Security Number (xxxx) and Date of Birth (mm/dd/yyyy) as indicated and click Submit. You will be taken to the next sign-up page. 5. Create a Amplitude ID. This will be your Amplitude login ID and cannot be changed, so think of one that is secure and easy to remember. 6. Create a Amplitude password. You can change your password at any time. 7. Enter your Password Reset Question and Answer. This can be used at a later time if you forget your password. 8. Enter your e-mail address. You will receive e-mail notification when new information is available in [address] 9. Click Sign Up. You can now view and download portions of your medical record. 10. Click the Download Summary menu link to download a portable copy of your medical information. If you have questions, please visit the Frequently Asked Questions section of the Bright.md website. Remember, Bright.md is NOT to be used for urgent needs. For medical emergencies, dial 911. Now available from your iPhone and Android! Please provide this summary of care documentation to your next provider. Your primary care clinician is listed as Francee Cooks. If you have any questions after today's visit, please call [phone].

## 2017-07-27 NOTE — PROGRESS NOTES
Nasima Nolen is a 43 y.o. female presenting for/with: Allergy Injection    S: pt in for allergy shot  O: There were no vitals taken for this visit. WDWN patient in no acute distress    A: allergy shot given per protocol. Pt observed [id number], no complications or abnormal reaction. Looking well and has no complaints. P: pt is due in 1 Weeks for next injection.

## 2017-08-01 ENCOUNTER — CLINICAL SUPPORT (OUTPATIENT)
Dept: FAMILY MEDICINE CLINIC | Age: 37
End: 2017-08-01

## 2017-08-01 DIAGNOSIS — J30.89 ENVIRONMENTAL AND SEASONAL ALLERGIES: Primary | ICD-10-CM

## 2017-08-01 NOTE — MR AVS SNAPSHOT
Visit Information Date & Time Provider Department Dept. Phone Encounter #  
 8/1/2017  3:15 PM [address] [place] [address] [phone] [id number] Your Appointments 9/28/2017  4:00 PM  
ESTABLISHED PATIENT with Radha Castillo NP  
[address] ([facility]) Appt Note: 4 mo F/U  
 [address] [place] [address] [address]  
3021 [facility] [address] [address] Upcoming Health Maintenance Date Due DTaP/Tdap/Td series (1 - Tdap) 12/21/1999 INFLUENZA AGE 9 TO ADULT 8/1/2017 PAP AKA CERVICAL CYTOLOGY 10/29/2018 Allergies as of 8/1/2017  Review Complete On: 5/31/2017 By: Radha Castillo NP Severity Noted Reaction Type Reactions Azithromycin High 09/12/2012   Systemic Swelling  
 z pack Current Immunizations  Reviewed on 10/7/2012 Name Date Influenza Vaccine Split  Deferred (Patient Refused) Not reviewed this visit Vitals OB Status Smoking Status IUD Passive Smoke Exposure - Never Smoker Preferred Pharmacy Pharmacy Name Phone [address], [facility] [place] Theo Arguello [phone] Your Updated Medication List  
  
   
This list is accurate as of: 8/1/17  4:08 PM.  Always use your most recent med list.  
  
  
  
  
 amoxicillin 500 mg capsule Commonly known as:  AMOXIL Take 1 Cap by mouth three (3) times daily. buPROPion  mg SR tablet Commonly known as:  Philemon Moran Take 1 Tab by mouth daily. Indications: ANXIETY WITH DEPRESSION  
  
 fluticasone 50 mcg/actuation nasal spray Commonly known as:  Curtistine Paci 2 Sprays by Both Nostrils route daily. levocetirizine 5 mg tablet Commonly known as:  Libertad Almaguer Take  by mouth. MIRENA 20 mcg/24 hr (5 years) IUD Generic drug:  levonorgestrel 1 Each by IntraUTERine route once. valACYclovir 1 gram tablet Commonly known as:  VALTREX  
2 tablets initially then 2 tablets q 12 hours as needed for lesion  Indications: HERPES LABIALIS  
  
 WOMEN'S ONE DAILY PO Take  by mouth daily. Introducing [facility]! [employer] introduces RallyPoint patient portal. Now you can access parts of your medical record, email your doctor's office, and request medication refills online. 1. In your internet browser, go to [email or web address] 2. Click on the First Time User? Click Here link in the Sign In box. You will see the New Member Sign Up page. 3. Enter your RallyPoint Access Code exactly as it appears below. You will not need to use this code after youve completed the sign-up process. If you do not sign up before the expiration date, you must request a new code. · RallyPoint Access Code: [email or web address] Expires: 10/18/2017  1:14 PM 
 
4. Enter the last four digits of your Social Security Number (xxxx) and Date of Birth (mm/dd/yyyy) as indicated and click Submit. You will be taken to the next sign-up page. 5. Create a RallyPoint ID. This will be your RallyPoint login ID and cannot be changed, so think of one that is secure and easy to remember. 6. Create a RallyPoint password. You can change your password at any time. 7. Enter your Password Reset Question and Answer. This can be used at a later time if you forget your password. 8. Enter your e-mail address. You will receive e-mail notification when new information is available in [address] 9. Click Sign Up. You can now view and download portions of your medical record. 10. Click the Download Summary menu link to download a portable copy of your medical information. If you have questions, please visit the Frequently Asked Questions section of the RallyPoint website. Remember, RallyPoint is NOT to be used for urgent needs. For medical emergencies, dial 911. Now available from your iPhone and Android! Please provide this summary of care documentation to your next provider. Your primary care clinician is listed as Anton Rhodes. If you have any questions after today's visit, please call [phone].

## 2017-08-08 ENCOUNTER — CLINICAL SUPPORT (OUTPATIENT)
Dept: FAMILY MEDICINE CLINIC | Age: 37
End: 2017-08-08

## 2017-08-08 DIAGNOSIS — Z51.6 ALLERGY DESENSITIZATION THERAPY: Primary | ICD-10-CM

## 2017-08-08 NOTE — PROGRESS NOTES
S:  Patient has no complaint. Patient is here for allergy injections. O:  WDWN in NAD    A:  Allergies and is undergoing desensitization therapy  P:  Allergy shot(s)two  given per protocol        Observed for 30 minutes        Return to clinic 7 days.

## 2017-08-08 NOTE — MR AVS SNAPSHOT
Visit Information Date & Time Provider Department Dept. Phone Encounter #  
 8/8/2017  3:45 PM [address] [phone] [id number] Your Appointments 9/28/2017  4:00 PM  
ESTABLISHED PATIENT with Jerrye Goodpasture, JAYNA  
[address] [place] ([facility]) Appt Note: 4 mo F/U  
 [address] [place] [address] [address]  
30[address] [facility] [address] [address] Upcoming Health Maintenance Date Due DTaP/Tdap/Td series (1 - Tdap) 12/21/1999 INFLUENZA AGE 9 TO ADULT 8/1/2017 PAP AKA CERVICAL CYTOLOGY 10/29/2018 Allergies as of 8/8/2017  Review Complete On: 8/1/2017 By: Joana Marrufo RN Severity Noted Reaction Type Reactions Azithromycin High 09/12/2012   Systemic Swelling  
 z pack Current Immunizations  Reviewed on 10/7/2012 Name Date Influenza Vaccine Split  Deferred (Patient Refused) Not reviewed this visit Vitals OB Status Smoking Status IUD Passive Smoke Exposure - Never Smoker Your Updated Medication List  
  
   
This list is accurate as of: 8/8/17  4:06 PM.  Always use your most recent med list.  
  
  
  
  
 amoxicillin 500 mg capsule Commonly known as:  AMOXIL Take 1 Cap by mouth three (3) times daily. buPROPion  mg SR tablet Commonly known as:  Lele Champaign Take 1 Tab by mouth daily. Indications: ANXIETY WITH DEPRESSION  
  
 fluticasone 50 mcg/actuation nasal spray Commonly known as:  Genie Anson 2 Sprays by Both Nostrils route daily. levocetirizine 5 mg tablet Commonly known as:  Sade Bingham Take  by mouth. MIRENA 20 mcg/24 hr (5 years) IUD Generic drug:  levonorgestrel 1 Each by IntraUTERine route once. valACYclovir 1 gram tablet Commonly known as:  VALTREX  
2 tablets initially then 2 tablets q 12 hours as needed for lesion Indications: HERPES LABIALIS  
  
 WOMEN'S ONE DAILY PO Take  by mouth daily. Introducing [facility] & HE[facility] SERVICES! Alicia Brock introduces MediaSilo patient portal. Now you can access parts of your medical record, email your doctor's office, and request medication refills online. 1. In your internet browser, go to [email or web address] Mobile International 2. Click on the First Time User? Click Here link in the Sign In box. You will see the New Member Sign Up page. 3. Enter your MediaSilo Access Code exactly as it appears below. You will not need to use this code after youve completed the sign-up process. If you do not sign up before the expiration date, you must request a new code. · MediaSilo Access Code: [email or web address] Expires: 10/18/2017  1:14 PM 
 
4. Enter the last four digits of your Social Security Number (xxxx) and Date of Birth (mm/dd/yyyy) as indicated and click Submit. You will be taken to the next sign-up page. 5. Create a MediaSilo ID. This will be your MediaSilo login ID and cannot be changed, so think of one that is secure and easy to remember. 6. Create a MediaSilo password. You can change your password at any time. 7. Enter your Password Reset Question and Answer. This can be used at a later time if you forget your password. 8. Enter your e-mail address. You will receive e-mail notification when new information is available in [address] 9. Click Sign Up. You can now view and download portions of your medical record. 10. Click the Download Summary menu link to download a portable copy of your medical information. If you have questions, please visit the Frequently Asked Questions section of the MediaSilo website. Remember, MediaSilo is NOT to be used for urgent needs. For medical emergencies, dial 911. Now available from your iPhone and Android! Please provide this summary of care documentation to your next provider. Your primary care clinician is listed as Anna Josef. If you have any questions after today's visit, please call [phone].

## 2017-08-15 ENCOUNTER — CLINICAL SUPPORT (OUTPATIENT)
Dept: FAMILY MEDICINE CLINIC | Age: 37
End: 2017-08-15

## 2017-08-15 DIAGNOSIS — Z51.6 ALLERGY DESENSITIZATION THERAPY: Primary | ICD-10-CM

## 2017-08-15 NOTE — MR AVS SNAPSHOT
Visit Information Date & Time Provider Department Dept. Phone Encounter #  
 8/15/2017  3:45 PM [address] [place] [address] [phone] [id number] Your Appointments 9/28/2017  4:00 PM  
ESTABLISHED PATIENT with Vianey Donald, JAYNA  
[address] ([facility]) Appt Note: 4 mo F/U  
 [address] [place] [address] [address]  
30[address] [facility] [address] [address] Upcoming Health Maintenance Date Due DTaP/Tdap/Td series (1 - Tdap) 12/21/1999 INFLUENZA AGE 9 TO ADULT 8/1/2017 PAP AKA CERVICAL CYTOLOGY 10/29/2018 Allergies as of 8/15/2017  Review Complete On: 8/1/2017 By: Merissa Paredes RN Severity Noted Reaction Type Reactions Azithromycin High 09/12/2012   Systemic Swelling  
 z pack Current Immunizations  Reviewed on 10/7/2012 Name Date Influenza Vaccine Split  Deferred (Patient Refused) Not reviewed this visit Vitals OB Status Smoking Status IUD Passive Smoke Exposure - Never Smoker Preferred Pharmacy Pharmacy Name Phone [address] [place] [address], [address] [place] [address] Theo Jaime [phone] Your Updated Medication List  
  
   
This list is accurate as of: 8/15/17  4:51 PM.  Always use your most recent med list.  
  
  
  
  
 amoxicillin 500 mg capsule Commonly known as:  AMOXIL Take 1 Cap by mouth three (3) times daily. buPROPion  mg SR tablet Commonly known as:  Darrell Freiberg Take 1 Tab by mouth daily. Indications: ANXIETY WITH DEPRESSION  
  
 fluticasone 50 mcg/actuation nasal spray Commonly known as:  Berrien Lunenburg 2 Sprays by Both Nostrils route daily. levocetirizine 5 mg tablet Commonly known as:  Bogdan Furnish Take  by mouth. MIRENA 20 mcg/24 hr (5 years) IUD Generic drug:  levonorgestrel 1 Each by IntraUTERine route once. valACYclovir 1 gram tablet Commonly known as:  VALTREX  
2 tablets initially then 2 tablets q 12 hours as needed for lesion  Indications: HERPES LABIALIS  
  
 WOMEN'S ONE DAILY PO Take  by mouth daily. Introducing [facility] & HEALTH SERVICES! [employer] introduces fabrooms patient portal. Now you can access parts of your medical record, email your doctor's office, and request medication refills online. 1. In your internet browser, go to [email or web address] 2. Click on the First Time User? Click Here link in the Sign In box. You will see the New Member Sign Up page. 3. Enter your fabrooms Access Code exactly as it appears below. You will not need to use this code after youve completed the sign-up process. If you do not sign up before the expiration date, you must request a new code. · fabrooms Access Code: [email or web address] Expires: 10/18/2017  1:14 PM 
 
4. Enter the last four digits of your Social Security Number (xxxx) and Date of Birth (mm/dd/yyyy) as indicated and click Submit. You will be taken to the next sign-up page. 5. Create a fabrooms ID. This will be your fabrooms login ID and cannot be changed, so think of one that is secure and easy to remember. 6. Create a fabrooms password. You can change your password at any time. 7. Enter your Password Reset Question and Answer. This can be used at a later time if you forget your password. 8. Enter your e-mail address. You will receive e-mail notification when new information is available in [address] 9. Click Sign Up. You can now view and download portions of your medical record. 10. Click the Download Summary menu link to download a portable copy of your medical information. If you have questions, please visit the Frequently Asked Questions section of the fabrooms website. Remember, fabrooms is NOT to be used for urgent needs. For medical emergencies, dial 911. Now available from your iPhone and Android! Please provide this summary of care documentation to your next provider. Your primary care clinician is listed as Alex Cuba. If you have any questions after today's visit, please call [phone].

## 2017-08-15 NOTE — PROGRESS NOTES
S:  Patient has no complaint. Patient is here for allergy injections.     O:  WDWN in NAD    A:  Allergies and is undergoing desensitization therapy  P:  Allergy shot(s0 minutes        Return to clinic 14 days

## 2017-08-22 ENCOUNTER — CLINICAL SUPPORT (OUTPATIENT)
Dept: FAMILY MEDICINE CLINIC | Age: 37
End: 2017-08-22

## 2017-08-22 DIAGNOSIS — Z51.6 ALLERGY DESENSITIZATION THERAPY: Primary | ICD-10-CM

## 2017-08-22 NOTE — MR AVS SNAPSHOT
Visit Information Date & Time Provider Department Dept. Phone Encounter #  
 8/22/2017  4:00 PM [facility] [facility] [phone] [id number] Your Appointments 8/22/2017  4:00 PM  
Nurse Visit with [address] [facility] ([address]) Appt Note: allergy injection [address] [facility] [address]  
[phone]  
  
   
 [address] [address] [facility] [address]  
  
    
 9/26/2017  4:00 PM  
ESTABLISHED PATIENT with Ivan Cooper NP  
[address] [place] ([address]) Appt Note: 4 mo F/U  
 [address] [facility] [address]  
30[address] [facility] [address] [facility] [address] Upcoming Health Maintenance Date Due DTaP/Tdap/Td series (1 - Tdap) 12/21/1999 INFLUENZA AGE 9 TO ADULT 8/1/2017 PAP AKA CERVICAL CYTOLOGY 10/29/2018 Allergies as of 8/22/2017  Review Complete On: 8/1/2017 By: Cary Carbajal RN Severity Noted Reaction Type Reactions Azithromycin High 09/12/2012   Systemic Swelling  
 z pack Current Immunizations  Reviewed on 10/7/2012 Name Date Influenza Vaccine Split  Deferred (Patient Refused) Not reviewed this visit Vitals OB Status Smoking Status IUD Passive Smoke Exposure - Never Smoker Your Updated Medication List  
  
   
This list is accurate as of: 8/22/17  3:52 PM.  Always use your most recent med list.  
  
  
  
  
 amoxicillin 500 mg capsule Commonly known as:  AMOXIL Take 1 Cap by mouth three (3) times daily. buPROPion  mg SR tablet Commonly known as:  Janet Clink Take 1 Tab by mouth daily. Indications: ANXIETY WITH DEPRESSION  
  
 fluticasone 50 mcg/actuation nasal spray Commonly known as:  Leafy Few 2 Sprays by Both Nostrils route daily. levocetirizine 5 mg tablet Commonly known as:  Theadore Brome Take  by mouth. MIRENA 20 mcg/24 hr (5 years) IUD Generic drug:  levonorgestrel 1 Each by IntraUTERine route once. valACYclovir 1 gram tablet Commonly known as:  VALTREX  
2 tablets initially then 2 tablets q 12 hours as needed for lesion  Indications: HERPES LABIALIS  
  
 WOMEN'S ONE DAILY PO Take  by mouth daily. Introducing [facility] & HEALTH SERVICES! Jeannine Lee introduces BroadLight patient portal. Now you can access parts of your medical record, email your doctor's office, and request medication refills online. 1. In your internet browser, go to [email or web address] 2. Click on the First Time User? Click Here link in the Sign In box. You will see the New Member Sign Up page. 3. Enter your BroadLight Access Code exactly as it appears below. You will not need to use this code after youve completed the sign-up process. If you do not sign up before the expiration date, you must request a new code. · BroadLight Access Code: [email or web address] Expires: 10/18/2017  1:14 PM 
 
4. Enter the last four digits of your Social Security Number (xxxx) and Date of Birth (mm/dd/yyyy) as indicated and click Submit. You will be taken to the next sign-up page. 5. Create a BroadLight ID. This will be your BroadLight login ID and cannot be changed, so think of one that is secure and easy to remember. 6. Create a BroadLight password. You can change your password at any time. 7. Enter your Password Reset Question and Answer. This can be used at a later time if you forget your password. 8. Enter your e-mail address. You will receive e-mail notification when new information is available in [address] 9. Click Sign Up. You can now view and download portions of your medical record. 10. Click the Download Summary menu link to download a portable copy of your medical information.  
 
If you have questions, please visit the Frequently Asked Questions section of the [email or web address]. Remember, QCoefficienthart is NOT to be used for urgent needs. For medical emergencies, dial 911. Now available from your iPhone and Android! Please provide this summary of care documentation to your next provider. Your primary care clinician is listed as Lilia Antonio. If you have any questions after today's visit, please call [phone].

## 2017-08-22 NOTE — PROGRESS NOTES
S:  Patient has no complaint. Patient is here for allergy injections.     O:  WDWN in NAD    A:  Allergies and is undergoing desensitization therapy  P:  Allergy shot(s) two given per protocol        Observed [id number]        Return to clinic 7 days

## 2017-08-29 ENCOUNTER — CLINICAL SUPPORT (OUTPATIENT)
Dept: FAMILY MEDICINE CLINIC | Age: 37
End: 2017-08-29

## 2017-08-29 DIAGNOSIS — Z51.6 ALLERGY DESENSITIZATION THERAPY: Primary | ICD-10-CM

## 2017-08-29 NOTE — PROGRESS NOTES
S:  Patient has no complaint. Patient is here for allergy injections.     O:  WDWN in NAD    A:  Allergies and is undergoing desensitization therapy  P:  Allergy shot(s) two  given per protocol        Observed for 0 minutes        Return to clinic 7 days

## 2017-08-29 NOTE — MR AVS SNAPSHOT
Visit Information Date & Time Provider Department Dept. Phone Encounter #  
 8/29/2017  3:30 PM [address] [place] [phone] [id number] Your Appointments 9/26/2017  4:00 PM  
ESTABLISHED PATIENT with Winifred Whiteside, NP  
[address] ([facility]) Appt Note: 4 mo F/U  
 [address] [address] 3[address] [facility] [address] [address] Upcoming Health Maintenance Date Due DTaP/Tdap/Td series (1 - Tdap) 12/21/1999 INFLUENZA AGE 9 TO ADULT 8/1/2017 PAP AKA CERVICAL CYTOLOGY 10/29/2018 Allergies as of 8/29/2017  Review Complete On: 8/1/2017 By: Kathleen Harvey RN Severity Noted Reaction Type Reactions Azithromycin High 09/12/2012   Systemic Swelling  
 z pack Current Immunizations  Reviewed on 10/7/2012 Name Date Influenza Vaccine Split  Deferred (Patient Refused) Not reviewed this visit Vitals OB Status Smoking Status IUD Passive Smoke Exposure - Never Smoker Your Updated Medication List  
  
   
This list is accurate as of: 8/29/17  4:04 PM.  Always use your most recent med list.  
  
  
  
  
 amoxicillin 500 mg capsule Commonly known as:  AMOXIL Take 1 Cap by mouth three (3) times daily. buPROPion  mg SR tablet Commonly known as:  Dyane Deed Take 1 Tab by mouth daily. Indications: ANXIETY WITH DEPRESSION  
  
 fluticasone 50 mcg/actuation nasal spray Commonly known as:  Ulises Calk 2 Sprays by Both Nostrils route daily. levocetirizine 5 mg tablet Commonly known as:  Tylene Boyds Take  by mouth. MIRENA 20 mcg/24 hr (5 years) IUD Generic drug:  levonorgestrel 1 Each by IntraUTERine route once. valACYclovir 1 gram tablet Commonly known as:  VALTREX  
2 tablets initially then 2 tablets q 12 hours as needed for lesion Indications: HERPES LABIALIS  
  
 WOMEN'S ONE DAILY PO Take  by mouth daily. Introducing [facility] & HEALTH SERVICES! Liz Lakhani introduces AwayFind patient portal. Now you can access parts of your medical record, email your doctor's office, and request medication refills online. 1. In your internet browser, go to [email or web address] 2. Click on the First Time User? Click Here link in the Sign In box. You will see the New Member Sign Up page. 3. Enter your AwayFind Access Code exactly as it appears below. You will not need to use this code after youve completed the sign-up process. If you do not sign up before the expiration date, you must request a new code. · AwayFind Access Code: [email or web address] Expires: 10/18/2017  1:14 PM 
 
4. Enter the last four digits of your Social Security Number (xxxx) and Date of Birth (mm/dd/yyyy) as indicated and click Submit. You will be taken to the next sign-up page. 5. Create a AwayFind ID. This will be your AwayFind login ID and cannot be changed, so think of one that is secure and easy to remember. 6. Create a AwayFind password. You can change your password at any time. 7. Enter your Password Reset Question and Answer. This can be used at a later time if you forget your password. 8. Enter your e-mail address. You will receive e-mail notification when new information is available in [address] 9. Click Sign Up. You can now view and download portions of your medical record. 10. Click the Download Summary menu link to download a portable copy of your medical information. If you have questions, please visit the Frequently Asked Questions section of the AwayFind website. Remember, AwayFind is NOT to be used for urgent needs. For medical emergencies, dial 911. Now available from your iPhone and Android! Please provide this summary of care documentation to your next provider. Your primary care clinician is listed as Laurie Luke. If you have any questions after today's visit, please call [phone].

## 2017-09-05 ENCOUNTER — CLINICAL SUPPORT (OUTPATIENT)
Dept: FAMILY MEDICINE CLINIC | Age: 37
End: 2017-09-05

## 2017-09-05 DIAGNOSIS — Z51.6 ALLERGY DESENSITIZATION THERAPY: Primary | ICD-10-CM

## 2017-09-05 NOTE — PROGRESS NOTES
S:  Patient has no complaint. Patient is here for allergy injections.     O:  WDWN in NAD    A:  Allergies and is undergoing desensitization therapy  P:  Allergy shot(s) twogiven per protocol        Observed for0 minutes        Return to clinic 7 days

## 2017-09-05 NOTE — MR AVS SNAPSHOT
Visit Information Date & Time Provider Department Dept. Phone Encounter #  
 9/5/2017  4:00 PM [address] [phone] [id number] Your Appointments 9/5/2017  4:00 PM  
Nurse Visit with [address] ([address]) Appt Note: Allergy shot  
 [address] [address]  
[phone]  
  
   
 [address] [address]  
  
    
 9/26/2017  4:00 PM  
ESTABLISHED PATIENT with Gaylia Hands, NP  
[address] ([address]) Appt Note: 4 mo F/U  
 [address] [address]  
30[address] [facility] [address] [address] Upcoming Health Maintenance Date Due DTaP/Tdap/Td series (1 - Tdap) 12/21/1999 INFLUENZA AGE 9 TO ADULT 8/1/2017 PAP AKA CERVICAL CYTOLOGY 10/29/2018 Allergies as of 9/5/2017  Review Complete On: 8/1/2017 By: Yaritza Adams RN Severity Noted Reaction Type Reactions Azithromycin High 09/12/2012   Systemic Swelling  
 z pack Current Immunizations  Reviewed on 10/7/2012 Name Date Influenza Vaccine Split  Deferred (Patient Refused) Not reviewed this visit Vitals OB Status Smoking Status IUD Passive Smoke Exposure - Never Smoker Preferred Pharmacy Pharmacy Name Phone [address] Theo [address] [phone] Your Updated Medication List  
  
   
This list is accurate as of: 9/5/17  3:55 PM.  Always use your most recent med list.  
  
  
  
  
 amoxicillin 500 mg capsule Commonly known as:  AMOXIL Take 1 Cap by mouth three (3) times daily. buPROPion  mg SR tablet Commonly known as:  Mark Patch Take 1 Tab by mouth daily.  Indications: ANXIETY WITH DEPRESSION  
  
 fluticasone 50 mcg/actuation nasal spray Commonly known as:  Leafy Few 2 Sprays by Both Nostrils route daily. levocetirizine 5 mg tablet Commonly known as:  Christen Romberg Take  by mouth. MIRENA 20 mcg/24 hr (5 years) IUD Generic drug:  levonorgestrel 1 Each by IntraUTERine route once. valACYclovir 1 gram tablet Commonly known as:  VALTREX  
2 tablets initially then 2 tablets q 12 hours as needed for lesion  Indications: HERPES LABIALIS  
  
 WOMEN'S ONE DAILY PO Take  by mouth daily. Introducing [facility] & HEALTH SERVICES! [facility] introduces [employer] patient portal. Now you can access parts of your medical record, email your doctor's office, and request medication refills online. 1. In your internet browser, go to [email or web address] 2. Click on the First Time User? Click Here link in the Sign In box. You will see the New Member Sign Up page. 3. Enter your [employer] Access Code exactly as it appears below. You will not need to use this code after youve completed the sign-up process. If you do not sign up before the expiration date, you must request a new code. · [employer] Access Code: [email or web address] Expires: 10/18/2017  1:14 PM 
 
4. Enter the last four digits of your Social Security Number (xxxx) and Date of Birth (mm/dd/yyyy) as indicated and click Submit. You will be taken to the next sign-up page. 5. Create a Switchflyt ID. This will be your [employer] login ID and cannot be changed, so think of one that is secure and easy to remember. 6. Create a [employer] password. You can change your password at any time. 7. Enter your Password Reset Question and Answer. This can be used at a later time if you forget your password. 8. Enter your e-mail address. You will receive e-mail notification when new information is available in [address] 9. Click Sign Up. You can now view and download portions of your medical record. 10. Click the Download Summary menu link to download a portable copy of your medical information. If you have questions, please visit the Frequently Asked Questions section of the BackOps website. Remember, Back[employer] is NOT to be used for urgent needs. For medical emergencies, dial 911. Now available from your iPhone and Android! Please provide this summary of care documentation to your next provider. Your primary care clinician is listed as Geri Justice. If you have any questions after today's visit, please call [phone].

## 2017-09-12 ENCOUNTER — CLINICAL SUPPORT (OUTPATIENT)
Dept: FAMILY MEDICINE CLINIC | Age: 37
End: 2017-09-12

## 2017-09-12 DIAGNOSIS — Z51.6 ALLERGY DESENSITIZATION THERAPY: Primary | ICD-10-CM

## 2017-09-12 NOTE — PROGRESS NOTES
S:  Patient has no complaint. Patient is here for allergy injections.     O:  WDWN in NAD    A:  Allergies and is undergoing desensitization therapy  P:  Allergy shot(s)  twogiven per protocol        Observed for 0minutes        Return to clinic 7 days

## 2017-09-19 ENCOUNTER — CLINICAL SUPPORT (OUTPATIENT)
Dept: FAMILY MEDICINE CLINIC | Age: 37
End: 2017-09-19

## 2017-09-19 DIAGNOSIS — Z51.6 ALLERGY DESENSITIZATION THERAPY: Primary | ICD-10-CM

## 2017-09-19 NOTE — PROGRESS NOTES
S:  Patient has no complaint. Patient is here for allergy injections.     O:  WDWN in NAD    A:  Allergies and is undergoing desensitization therapy  P:  Allergy shot(s two) given per protocol        Observed for 0minutes        Return to clinic 7 days

## 2017-09-19 NOTE — MR AVS SNAPSHOT
Visit Information Date & Time Provider Department Dept. Phone Encounter #  
 9/19/2017  3:30 PM [address] [phone] [id number] Your Appointments 9/26/2017  4:00 PM  
ESTABLISHED PATIENT with Jimmy Granger, JAYNA  
Rosamaria Lisa ([address]) Appt Note: 4 mo F/U  
 [address] [address]  
[address] [facility] [address] [address] Upcoming Health Maintenance Date Due DTaP/Tdap/Td series (1 - Tdap) 12/21/1999 INFLUENZA AGE 9 TO ADULT 8/1/2017 PAP AKA CERVICAL CYTOLOGY 10/29/2018 Allergies as of 9/19/2017  Review Complete On: 8/1/2017 By: Sheri Gutiérrez RN Severity Noted Reaction Type Reactions Azithromycin High 09/12/2012   Systemic Swelling  
 z pack Current Immunizations  Reviewed on 10/7/2012 Name Date Influenza Vaccine Split  Deferred (Patient Refused) Not reviewed this visit You Were Diagnosed With   
  
 Codes Comments Allergy desensitization therapy    -  Primary ICD-10-CM: Z51.6 ICD-9-CM: V07.1 Vitals OB Status Smoking Status IUD Passive Smoke Exposure - Never Smoker Preferred Pharmacy Pharmacy Name Phone [address], [address] [place] [address] Theo [address] [phone] Your Updated Medication List  
  
   
This list is accurate as of: 9/19/17  3:43 PM.  Always use your most recent med list.  
  
  
  
  
 amoxicillin 500 mg capsule Commonly known as:  AMOXIL Take 1 Cap by mouth three (3) times daily. buPROPion  mg SR tablet Commonly known as:  Merdis Dieter Take 1 Tab by mouth daily. Indications: ANXIETY WITH DEPRESSION  
  
 fluticasone 50 mcg/actuation nasal spray Commonly known as:  Philadelphia Capes 2 Sprays by Both Nostrils route daily. levocetirizine 5 mg tablet Commonly known as:  Jyoti Meager Take  by mouth. MIRENA 20 mcg/24 hr (5 years) IUD Generic drug:  levonorgestrel 1 Each by IntraUTERine route once. valACYclovir 1 gram tablet Commonly known as:  VALTREX  
2 tablets initially then 2 tablets q 12 hours as needed for lesion  Indications: HERPES LABIALIS  
  
 WOMEN'S ONE DAILY PO Take  by mouth daily. We Performed the Following IMMUNOTHERAPY, 2+ INJECTIONS [id number] CPT(R)] Introducing [facility] & HEALTH SERVICES! [employer] introduces [employer] patient portal. Now you can access parts of your medical record, email your doctor's office, and request medication refills online. 1. In your internet browser, go to [email or web address] 2. Click on the First Time User? Click Here link in the Sign In box. You will see the New Member Sign Up page. 3. Enter your [employer] Access Code exactly as it appears below. You will not need to use this code after youve completed the sign-up process. If you do not sign up before the expiration date, you must request a new code. · [employer] Access Code: [email or web address] Expires: 10/18/2017  1:14 PM 
 
4. Enter the last four digits of your Social Security Number (xxxx) and Date of Birth (mm/dd/yyyy) as indicated and click Submit. You will be taken to the next sign-up page. 5. Create a [employer] ID. This will be your [employer] login ID and cannot be changed, so think of one that is secure and easy to remember. 6. Create a [employer] password. You can change your password at any time. 7. Enter your Password Reset Question and Answer. This can be used at a later time if you forget your password. 8. Enter your e-mail address. You will receive e-mail notification when new information is available in [address] 9. Click Sign Up. You can now view and download portions of your medical record. 10. Click the Download Summary menu link to download a portable copy of your medical information. If you have questions, please visit the Frequently Asked Questions section of the [employer] website. Remember, "[employer] is NOT to be used for urgent needs. For medical emergencies, dial 911. Now available from your iPhone and Android! Please provide this summary of care documentation to your next provider. Your primary care clinician is listed as Chris Art. If you have any questions after today's visit, please call [phone].

## 2017-09-26 ENCOUNTER — OFFICE VISIT (OUTPATIENT)
Dept: FAMILY MEDICINE CLINIC | Age: 37
End: 2017-09-26

## 2017-09-26 VITALS
SYSTOLIC BLOOD PRESSURE: 114 MMHG | DIASTOLIC BLOOD PRESSURE: 80 MMHG | HEIGHT: 65 IN | HEART RATE: 76 BPM | OXYGEN SATURATION: 99 % | BODY MASS INDEX: 26.33 KG/M2 | WEIGHT: 158 LBS | TEMPERATURE: 98.5 F | RESPIRATION RATE: 16 BRPM

## 2017-09-26 DIAGNOSIS — J01.00 ACUTE NON-RECURRENT MAXILLARY SINUSITIS: ICD-10-CM

## 2017-09-26 DIAGNOSIS — J30.89 ENVIRONMENTAL AND SEASONAL ALLERGIES: ICD-10-CM

## 2017-09-26 DIAGNOSIS — Z51.6 ALLERGY DESENSITIZATION THERAPY: Primary | ICD-10-CM

## 2017-09-26 DIAGNOSIS — J01.00 ACUTE MAXILLARY SINUSITIS, RECURRENCE NOT SPECIFIED: ICD-10-CM

## 2017-09-26 RX ORDER — AMOXICILLIN 500 MG/1
500 CAPSULE ORAL 3 TIMES DAILY
Qty: 30 CAP | Refills: 0 | Status: SHIPPED | OUTPATIENT
Start: 2017-09-26 | End: 2017-11-24 | Stop reason: ALTCHOICE

## 2017-09-26 RX ORDER — METHYLPREDNISOLONE 4 MG/1
TABLET ORAL
Qty: 21 TAB | Refills: 0 | Status: SHIPPED | OUTPATIENT
Start: 2017-09-26 | End: 2017-11-24 | Stop reason: ALTCHOICE

## 2017-09-26 RX ORDER — AZELASTINE 1 MG/ML
1 SPRAY, METERED NASAL 2 TIMES DAILY
Qty: 1 BOTTLE | Refills: 11 | Status: SHIPPED | OUTPATIENT
Start: 2017-09-26 | End: 2017-11-24 | Stop reason: SDUPTHER

## 2017-09-26 RX ORDER — BUPROPION HYDROCHLORIDE 200 MG/1
200 TABLET, EXTENDED RELEASE ORAL DAILY
Qty: 90 TAB | Refills: 1 | Status: SHIPPED | OUTPATIENT
Start: 2017-09-26 | End: 2017-11-24 | Stop reason: SDUPTHER

## 2017-09-26 NOTE — MR AVS SNAPSHOT
Visit Information Date & Time Provider Department Dept. Phone Encounter #  
 9/26/2017  4:00 PM Kashif Zapata NP [place] [address] [facility] [phone] [id number] Upcoming Health Maintenance Date Due DTaP/Tdap/Td series (1 - Tdap) 12/21/1999 INFLUENZA AGE 9 TO ADULT 8/1/2017 PAP AKA CERVICAL CYTOLOGY 10/29/2018 Allergies as of 9/26/2017  Review Complete On: 9/26/2017 By: Kashif Zapata NP Severity Noted Reaction Type Reactions Azithromycin High 09/12/2012   Systemic Swelling  
 z pack Current Immunizations  Reviewed on 10/7/2012 Name Date Influenza Vaccine Split  Deferred (Patient Refused) Not reviewed this visit You Were Diagnosed With   
  
 Codes Comments Acute non-recurrent maxillary sinusitis    -  Primary ICD-10-CM: J01.00 ICD-9-CM: 461.0 Environmental and seasonal allergies     ICD-10-CM: J30.89 ICD-9-CM: 477.8 Vitals BP Pulse Temp Resp Height(growth percentile) Weight(growth percentile) 114/80 (BP 1 Location: Left arm, BP Patient Position: Sitting) 76 98.5 °F (36.9 °C) (Oral) 16 5' 5\" (1.651 m) 158 lb (71.7 kg) SpO2 BMI OB Status Smoking Status 99% 26.29 kg/m2 IUD Passive Smoke Exposure - Never Smoker Vitals History BMI and BSA Data Body Mass Index Body Surface Area  
 26.29 kg/m 2 1.81 m 2 Preferred Pharmacy Pharmacy Name Phone [address], [facility] [place] Theo Mcdermott [facility] [phone] Your Updated Medication List  
  
   
This list is accurate as of: 9/26/17  5:12 PM.  Always use your most recent med list.  
  
  
  
  
 amoxicillin 500 mg capsule Commonly known as:  AMOXIL Take 1 Cap by mouth three (3) times daily. azelastine 137 mcg (0.1 %) nasal spray Commonly known as:  ASTELIN  
1 Spray by Both Nostrils route two (2) times a day. Use in each nostril as directed  Indications: SEASONAL ALLERGIC RHINITIS buPROPion  mg SR tablet Commonly known as:  Guillermo Hoang Take 1 Tab by mouth daily. Indications: ANXIETY WITH DEPRESSION  
  
 fluticasone 50 mcg/actuation nasal spray Commonly known as:  Kerry Gonzalez 2 Sprays by Both Nostrils route daily. levocetirizine 5 mg tablet Commonly known as:  Earl Kitten Take  by mouth.  
  
 methylPREDNISolone 4 mg Tab Commonly known as:  MEDROL Take 5 tablets day one, take 4 tablets day two, take 3 tablets day three,take 2 tablets day four, take 1 tablet day five. Indications: sinusitis MIRENA 20 mcg/24 hr (5 years) IUD Generic drug:  levonorgestrel 1 Each by IntraUTERine route once. valACYclovir 1 gram tablet Commonly known as:  VALTREX  
2 tablets initially then 2 tablets q 12 hours as needed for lesion  Indications: HERPES LABIALIS  
  
 WOMEN'S ONE DAILY PO Take  by mouth daily. Prescriptions Printed Refills  
 methylPREDNISolone (MEDROL) 4 mg tab 0 Sig: Take 5 tablets day one, take 4 tablets day two, take 3 tablets day three,take 2 tablets day four, take 1 tablet day five. Indications: sinusitis Class: Print Prescriptions Sent to Pharmacy Refills  
 amoxicillin (AMOXIL) 500 mg capsule 0 Sig: Take 1 Cap by mouth three (3) times daily. Class: Normal  
 Pharmacy: [address] [place] [address] [facility] Ph #: [phone] Route: Oral  
 azelastine (ASTELIN) 137 mcg (0.1 %) nasal spray [address] [place] by Both Nostrils route two (2) times a day. Use in each nostril as directed  Indications: SEASONAL ALLERGIC RHINITIS Class: Normal  
 Pharmacy: [address] [place] [address], [address] [place], [place] [address]k[facility] Ph #: [phone] Route: Both Nostrils buPROPion SR (WELLBUTRIN, ZYBAN) 200 mg SR tablet 1 Sig: Take 1 Tab by mouth daily. Indications: ANXIETY WITH DEPRESSION  Class: Normal  
 Pharmacy: [address] [facility], [address]  #: [phone] Route: Oral  
  
Introducing [facility] & HEALTH SERVICES! Tamika Hudson introduces [employer]n patient portal. Now you can access parts of your medical record, email your doctor's office, and request medication refills online. 1. In your internet browser, go to [email or web address] 2. Click on the First Time User? Click Here link in the Sign In box. You will see the New Member Sign Up page. 3. Enter your Ravn Access Code exactly as it appears below. You will not need to use this code after youve completed the sign-up process. If you do not sign up before the expiration date, you must request a new code. · Ravn Access Code: [email or web address] Expires: 10/18/2017  1:14 PM 
 
4. Enter the last four digits of your Social Security Number (xxxx) and Date of Birth (mm/dd/yyyy) as indicated and click Submit. You will be taken to the next sign-up page. 5. Create a Ravn ID. This will be your Ravn login ID and cannot be changed, so think of one that is secure and easy to remember. 6. Create a Ravn password. You can change your password at any time. 7. Enter your Password Reset Question and Answer. This can be used at a later time if you forget your password. 8. Enter your e-mail address. You will receive e-mail notification when new information is available in [address] 9. Click Sign Up. You can now view and download portions of your medical record. 10. Click the Download Summary menu link to download a portable copy of your medical information. If you have questions, please visit the Frequently Asked Questions section of the Ravn website. Remember, Ravn is NOT to be used for urgent needs. For medical emergencies, dial 911. Now available from your iPhone and Android! Please provide this summary of care documentation to your next provider. Your primary care clinician is listed as Crow Lucero. If you have any questions after today's visit, please call [phone].

## 2017-09-26 NOTE — PROGRESS NOTES
S:  Patient has no complaint. Patient is here for allergy injections.     O:  WDWN in NAD    A:  Allergies and is undergoing desensitization therapy  P:  Allergy shot(s) given per protocol        Observed for 0 minutes        Return to clinic 3 ddays

## 2017-09-27 NOTE — PROGRESS NOTES
Subjective:     Dave Loyd is a 43 y.o. female who presents today with the following:  Chief Complaint   Patient presents with    Medication Evaluation    Nasal Congestion   José Luis Faulkner presents for an acute visit with nasal congestion watery eyes and sinus pressure. Seasonal allergies: followed by an allergist . Taking Xzyzal and Flonase with moderate relief. . Discussed adding Astelin nasal spray. Depression: doing well on bupropion. COMPLIANT WITH MEDICATION:         ROS:  Gen: denies fever, chills, fatigue, weight loss, weight gain  HEENT:denies blurry vision, nasal congestion, sore throat  Resp: denies dypsnea, cough, wheezing  CV: denies chest pain radiating to the jaws or arms, palpitations, lower extremity edema  Abd: denies nausea, vomiting, diarrhea, constipation  Neuro: denies numbness/tingling  Endo: denies polyuria, polydipsia, heat/cold intolerance  Heme: no lymphadenopathy    Allergies   Allergen Reactions    Azithromycin Swelling     z pack         Current Outpatient Prescriptions:     amoxicillin (AMOXIL) 500 mg capsule, Take 1 Cap by mouth three (3) times daily. , Disp: 30 Cap, Rfl: 0    azelastine (ASTELIN) 137 mcg (0.1 %) nasal spray, 1 [place] by Both Nostrils route two (2) times a day. Use in each nostril as directed  Indications: SEASONAL ALLERGIC RHINITIS, Disp: 1 Bottle, Rfl: 11    methylPREDNISolone (MEDROL) 4 mg tab, Take 5 tablets day one, take 4 tablets day two, take 3 tablets day three,take 2 tablets day four, take 1 tablet day five. Indications: sinusitis, Disp: 21 Tab, Rfl: 0    buPROPion SR (WELLBUTRIN, ZYBAN) 200 mg SR tablet, Take 1 Tab by mouth daily.  Indications: ANXIETY WITH DEPRESSION, Disp: 90 Tab, Rfl: 1    levocetirizine (XYZAL) 5 mg tablet, Take  by mouth., Disp: , Rfl:     levonorgestrel (MIRENA) 20 mcg/24 hr (5 years) IUD, 1 Each by IntraUTERine route once., Disp: , Rfl:     MULTIVIT WITH CALCIUM,IRON,MIN (WOMEN'S ONE DAILY PO), Take  by mouth daily. , Disp: , Rfl:     valACYclovir (VALTREX) 1 gram tablet, 2 tablets initially then 2 tablets q 12 hours as needed for lesion  Indications: HERPES LABIALIS, Disp: 24 Tab, Rfl: 4    fluticasone (FLONASE) 50 mcg/actuation nasal spray, 2 Sprays by Both Nostrils route daily. , Disp: 1 Bottle, Rfl: 5    Past Medical History:   Diagnosis Date    Anemia NEC     IRON DEF ANEMIA    HX OTHER MEDICAL     BELLS PALSY 2010, CARPEL TUNNEL RIGHT WRIST    Psychiatric problem     DEPRESSION, ANXIETY       Past Surgical History:   Procedure Laterality Date     DELIVERY ONLY      HX OTHER SURGICAL      HERNIA REPAIR (\"AS A CHILD\")       History   Smoking Status    Passive Smoke Exposure - Never Smoker    Types: Cigarettes   Smokeless Tobacco    Never Used     Comment:  smokes but not in the house       Social History     Social History    Marital status:      Spouse name: N/A    Number of children: N/A    Years of education: N/A     Social History Main Topics    Smoking status: Passive Smoke Exposure - Never Smoker     Types: Cigarettes    Smokeless tobacco: Never Used      Comment:  smokes but not in the house    Alcohol use Yes      Comment: social drinker    Drug use: No    Sexual activity: Yes     Partners: Male     Other Topics Concern    None     Social History Narrative       Family History   Problem Relation Age of Onset    Diabetes Mother     Hypertension Mother     Diabetes Maternal Grandmother     Hypertension Maternal Grandmother     Diabetes Maternal Grandfather     Hypertension Maternal Grandfather     Arthritis-osteo Father          Objective:     Visit Vitals    /80 (BP 1 Location: Left arm, BP Patient Position: Sitting)    Pulse 76    Temp 98.5 °F (36.9 °C) (Oral)    Resp 16    Ht 5' 5\" (1.651 m)    Wt 158 lb (71.7 kg)    SpO2 99%    BMI 26.29 kg/m2     Body mass index is 26.29 kg/(m^2). General: Alert and oriented. No acute distress. Well nourished  HEENT : tenderness to palpation to maxillary sinuses. Ears:TMs are normal. Canals are clear. Eyes: pupils equal, round, react to light and accommodation. Extra ocular movements intact. Nose: patent. Mouth and throat is clear. Neck:supple full range of motion no thyromegaly. Trachea midline, No carotid bruits. No significant lymphadenopathy  Lungs[de-identified] clear to auscultation without wheezes, rales, or rhonchi. Heart :RRR, S1 & S2 are normal intensity. No murmur; no gallop  Abdomen: bowel sounds active. No tenderness, guarding, rebound, masses, hepatic or spleen enlargement  Back: no CVA tenderness. Extremities: without clubbing, cyanosis, or edema  Pulses: radial and femoral pulses are normal  Neuro: HMF intact. Cranial nerves II through XII grossly normal.  Motor: is 5 over 5 and symmetrical.   Deep tendon reflexes: +2 equal        No results found for this visit on 09/26/17. Assessment/ Plan:     Diagnoses and all orders for this visit:    1. Allergy desensitization therapy  -     IMMUNOTHERAPY, 2+ INJECTIONS    2. Acute non-recurrent maxillary sinusitis    3. Environmental and seasonal allergies    Other orders  -     amoxicillin (AMOXIL) 500 mg capsule; Take 1 Cap by mouth three (3) times daily. -     azelastine (ASTELIN) 137 mcg (0.1 %) nasal spray; 1 [place] by Both Nostrils route two (2) times a day. Use in each nostril as directed  Indications: SEASONAL ALLERGIC RHINITIS  -     methylPREDNISolone (MEDROL) 4 mg tab; Take 5 tablets day one, take 4 tablets day two, take 3 tablets day three,take 2 tablets day four, take 1 tablet day five. Indications: sinusitis  -     buPROPion SR (WELLBUTRIN, ZYBAN) 200 mg SR tablet; Take 1 Tab by mouth daily. Indications: ANXIETY WITH DEPRESSION         1. Allergy desensitization therapy    2. Acute non-recurrent maxillary sinusitis    3.  Environmental and seasonal allergies        Orders Placed This Encounter    IMMUNOTHERAPY, 2+ INJECTIONS    amoxicillin (AMOXIL) 500 mg capsule     Sig: Take 1 Cap by mouth three (3) times daily. Dispense:  30 Cap     Refill:  0    azelastine (ASTELIN) 137 mcg (0.1 %) nasal spray     Si [place] by Both Nostrils route two (2) times a day. Use in each nostril as directed  Indications: SEASONAL ALLERGIC RHINITIS     Dispense:  1 Bottle     Refill:  11    methylPREDNISolone (MEDROL) 4 mg tab     Sig: Take 5 tablets day one, take 4 tablets day two, take 3 tablets day three,take 2 tablets day four, take 1 tablet day five. Indications: sinusitis     Dispense:  21 Tab     Refill:  0     NP    buPROPion SR (WELLBUTRIN, ZYBAN) 200 mg SR tablet     Sig: Take 1 Tab by mouth daily. Indications: ANXIETY WITH DEPRESSION     Dispense:  90 Tab     Refill:  1     RTO in 6 months or sooner if needed. Verbal and written instructions (see AVS) provided.  Patient expresses understanding of diagnosis and treatment plan.     ADEBAYO Newell

## 2017-10-03 ENCOUNTER — CLINICAL SUPPORT (OUTPATIENT)
Dept: FAMILY MEDICINE CLINIC | Age: 37
End: 2017-10-03

## 2017-10-03 ENCOUNTER — TELEPHONE (OUTPATIENT)
Dept: FAMILY MEDICINE CLINIC | Age: 37
End: 2017-10-03

## 2017-10-03 DIAGNOSIS — Z51.6 ALLERGY DESENSITIZATION THERAPY: Primary | ICD-10-CM

## 2017-10-03 NOTE — PROGRESS NOTES
S:  Patient has no complaint. Patient is here for allergy injections.     O:  WDWN in NAD    A:  Allergies and is undergoing desensitization therapy  P:  Allergy shot(s) given per protocol        Observed for 0 minutes        Return to clinic 7days  days

## 2017-10-03 NOTE — TELEPHONE ENCOUNTER
Patient is not feeling any better. Is there anything else she can try. She is coming in this afternoon for an allergy injection.

## 2017-10-10 ENCOUNTER — CLINICAL SUPPORT (OUTPATIENT)
Dept: FAMILY MEDICINE CLINIC | Age: 37
End: 2017-10-10

## 2017-10-10 DIAGNOSIS — Z51.6 ALLERGY DESENSITIZATION THERAPY: Primary | ICD-10-CM

## 2017-10-10 NOTE — PROGRESS NOTES
S:  Patient has no complaint. Patient is here for allergy injections. O:  WDWN in NAD    A:  Allergies and is undergoing desensitization therapy  P:  Allergy shot(s) two given per protocol        Observed for 0 minutes        Return to clinic7 days.

## 2017-10-10 NOTE — MR AVS SNAPSHOT
Visit Information Date & Time Provider Department Dept. Phone Encounter #  
 10/10/2017  3:15 PM [address] [place] [address] [address] [phone] [id number] Your Appointments 10/10/2017  3:15 PM  
IMMUNIZATIONS/INJECTIONS with [address] [facility] ([facility]) Appt Note: [address] [id number]  
[address] [address] Upcoming Health Maintenance Date Due DTaP/Tdap/Td series (1 - Tdap) 12/21/1999 INFLUENZA AGE 9 TO ADULT 8/1/2017 PAP AKA CERVICAL CYTOLOGY 10/29/2018 Allergies as of 10/10/2017  Review Complete On: 9/26/2017 By: Danielle Milan NP Severity Noted Reaction Type Reactions Azithromycin High 09/12/2012   Systemic Swelling  
 z pack Current Immunizations  Reviewed on 10/7/2012 Name Date Influenza Vaccine Split  Deferred (Patient Refused) Not reviewed this visit You Were Diagnosed With   
  
 Codes Comments Allergy desensitization therapy    -  Primary ICD-10-CM: Z51.6 ICD-9-CM: V07.1 Vitals OB Status Smoking Status IUD Passive Smoke Exposure - Never Smoker Preferred Pharmacy Pharmacy Name Phone [address], [address] [place] [address] [facility] [phone] Your Updated Medication List  
  
   
This list is accurate as of: 10/10/17  3:07 PM.  Always use your most recent med list.  
  
  
  
  
 amoxicillin 500 mg capsule Commonly known as:  AMOXIL Take 1 Cap by mouth three (3) times daily. azelastine 137 mcg (0.1 %) nasal spray Commonly known as:  ASTELIN  
1 Spray by Both Nostrils route two (2) times a day. Use in each nostril as directed  Indications: SEASONAL ALLERGIC RHINITIS buPROPion  mg SR tablet Commonly known as:  Tamara Patrick  
 Take 1 Tab by mouth daily. Indications: ANXIETY WITH DEPRESSION  
  
 fluticasone 50 mcg/actuation nasal spray Commonly known as:  Torrance Peel 2 Sprays by Both Nostrils route daily. levocetirizine 5 mg tablet Commonly known as:  Gatha Levers Take  by mouth.  
  
 methylPREDNISolone 4 mg Tab Commonly known as:  MEDROL Take 5 tablets day one, take 4 tablets day two, take 3 tablets day three,take 2 tablets day four, take 1 tablet day five. Indications: sinusitis MIRENA 20 mcg/24 hr (5 years) IUD Generic drug:  levonorgestrel 1 Each by IntraUTERine route once. valACYclovir 1 gram tablet Commonly known as:  VALTREX  
2 tablets initially then 2 tablets q 12 hours as needed for lesion  Indications: HERPES LABIALIS  
  
 WOMEN'S ONE DAILY PO Take  by mouth daily. We Performed the Following IMMUNOTHERAPY, 2+ INJECTIONS [id number] CPT(R)] Introducing [facility] & [facility] SERVICES! Brenna Mccloud introduces [employer] patient portal. Now you can access parts of your medical record, email your doctor's office, and request medication refills online. 1. In your internet browser, go to [email or web address] 2. Click on the First Time User? Click Here link in the Sign In box. You will see the New Member Sign Up page. 3. Enter your W[employer] Access Code exactly as it appears below. You will not need to use this code after youve completed the sign-up process. If you do not sign up before the expiration date, you must request a new code. · [employer] Access Code: [email or web address] Expires: 10/18/2017  1:14 PM 
 
4. Enter the last four digits of your Social Security Number (xxxx) and Date of Birth (mm/dd/yyyy) as indicated and click Submit. You will be taken to the next sign-up page. 5. Create a W[employer]ou ID. This will be your W[employer]ou login ID and cannot be changed, so think of one that is secure and easy to remember. 6. Create a [facility] password. You can change your password at any time. 7. Enter your Password Reset Question and Answer. This can be used at a later time if you forget your password. 8. Enter your e-mail address. You will receive e-mail notification when new information is available in [address] 9. Click Sign Up. You can now view and download portions of your medical record. 10. Click the Download Summary menu link to download a portable copy of your medical information. If you have questions, please visit the Frequently Asked Questions section of the [facility] website. Remember, [facility] [facility] is NOT to be used for urgent needs. For medical emergencies, dial 911. Now available from your iPhone and Android! Please provide this summary of care documentation to your next provider. Your primary care clinician is listed as Lotus Shipman. If you have any questions after today's visit, please call [phone].

## 2017-10-17 ENCOUNTER — CLINICAL SUPPORT (OUTPATIENT)
Dept: FAMILY MEDICINE CLINIC | Age: 37
End: 2017-10-17

## 2017-10-17 DIAGNOSIS — Z51.6 ALLERGY DESENSITIZATION THERAPY: Primary | ICD-10-CM

## 2017-10-17 NOTE — MR AVS SNAPSHOT
Visit Information Date & Time Provider Department Dept. Phone Encounter #  
 10/17/2017  3:45 PM [address] [address] [address] [phone] [id number] Upcoming Health Maintenance Date Due DTaP/Tdap/Td series (1 - Tdap) 12/21/1999 INFLUENZA AGE 9 TO ADULT 8/1/2017 PAP AKA CERVICAL CYTOLOGY 10/29/2018 Allergies as of 10/17/2017  Review Complete On: 9/26/2017 By: Mary Carrasco NP Severity Noted Reaction Type Reactions Azithromycin High 09/12/2012   Systemic Swelling  
 z pack Current Immunizations  Reviewed on 10/7/2012 Name Date Influenza Vaccine Split  Deferred (Patient Refused) Not reviewed this visit Vitals OB Status Smoking Status IUD Passive Smoke Exposure - Never Smoker Preferred Pharmacy Pharmacy Name Phone [address] [place], [facility] Ruthie Blackburn [phone] Your Updated Medication List  
  
   
This list is accurate as of: 10/17/17  3:57 PM.  Always use your most recent med list.  
  
  
  
  
 amoxicillin 500 mg capsule Commonly known as:  AMOXIL Take 1 Cap by mouth three (3) times daily. azelastine 137 mcg (0.1 %) nasal spray Commonly known as:  ASTELIN  
1 Spray by Both Nostrils route two (2) times a day. Use in each nostril as directed  Indications: SEASONAL ALLERGIC RHINITIS buPROPion  mg SR tablet Commonly known as:  Blade Hailstone Take 1 Tab by mouth daily. Indications: ANXIETY WITH DEPRESSION  
  
 fluticasone 50 mcg/actuation nasal spray Commonly known as:  Lennice Boss 2 Sprays by Both Nostrils route daily. levocetirizine 5 mg tablet Commonly known as:  Kinga Solian Take  by mouth.  
  
 methylPREDNISolone 4 mg Tab Commonly known as:  MEDROL Take 5 tablets day one, take 4 tablets day two, take 3 tablets day three,take 2 tablets day four, take 1 tablet day five. Indications: sinusitis MIRENA 20 mcg/24 hr (5 years) IUD Generic drug:  levonorgestrel 1 Each by IntraUTERine route once. valACYclovir 1 gram tablet Commonly known as:  VALTREX  
2 tablets initially then 2 tablets q 12 hours as needed for lesion  Indications: HERPES LABIALIS  
  
 WOMEN'S ONE DAILY PO Take  by mouth daily. Introducing [facility] & HEALTH SERVICES! Ralph Wilburn introduces [employer] patient portal. Now you can access parts of your medical record, email your doctor's office, and request medication refills online. 1. In your internet browser, go to [email or web address] I/O 2. Click on the First Time User? Click Here link in the Sign In box. You will see the New Member Sign Up page. 3. Enter your [employer] Access Code exactly as it appears below. You will not need to use this code after youve completed the sign-up process. If you do not sign up before the expiration date, you must request a new code. · [employer] Access Code: [email or web address] Expires: 10/18/2017  1:14 PM 
 
4. Enter the last four digits of your Social Security Number (xxxx) and Date of Birth (mm/dd/yyyy) as indicated and click Submit. You will be taken to the next sign-up page. 5. Create a Ne[employer]one ID. This will be your Ne[employer]one login ID and cannot be changed, so think of one that is secure and easy to remember. 6. Create a [employer] password. You can change your password at any time. 7. Enter your Password Reset Question and Answer. This can be used at a later time if you forget your password. 8. Enter your e-mail address. You will receive e-mail notification when new information is available in [address] 9. Click Sign Up. You can now view and download portions of your medical record. 10. Click the Download Summary menu link to download a portable copy of your medical information.  
 
If you have questions, please visit the Frequently Asked Questions section of the [employer]. Remember, [employer] is NOT to be used for urgent needs. For medical emergencies, dial 911. Now available from your iPhone and Android! Please provide this summary of care documentation to your next provider. Your primary care clinician is listed as Joce Gonzalez. If you have any questions after today's visit, please call [phone].

## 2017-10-20 ENCOUNTER — CLINICAL SUPPORT (OUTPATIENT)
Dept: FAMILY MEDICINE CLINIC | Age: 37
End: 2017-10-20

## 2017-10-20 DIAGNOSIS — Z23 ENCOUNTER FOR IMMUNIZATION: Primary | ICD-10-CM

## 2017-10-24 ENCOUNTER — CLINICAL SUPPORT (OUTPATIENT)
Dept: FAMILY MEDICINE CLINIC | Age: 37
End: 2017-10-24

## 2017-10-24 DIAGNOSIS — J30.89 ENVIRONMENTAL AND SEASONAL ALLERGIES: ICD-10-CM

## 2017-10-24 DIAGNOSIS — Z51.6 ALLERGY DESENSITIZATION THERAPY: Primary | ICD-10-CM

## 2017-10-24 NOTE — MR AVS SNAPSHOT
Visit Information Date & Time Provider Department Dept. Phone Encounter #  
 10/24/2017  3:30 PM [address] [address] [address] [phone] [id number] Your Appointments 10/24/2017  3:30 PM  
IMMUNIZATIONS/INJECTIONS with [address] [address] [address] ([address]) Appt Note: [address] [address]  
[address] [facility] [address] Upcoming Health Maintenance Date Due DTaP/Tdap/Td series (1 - Tdap) 12/21/1999 PAP AKA CERVICAL CYTOLOGY 10/29/2018 Allergies as of 10/24/2017  Review Complete On: 9/26/2017 By: Bere Cota NP Severity Noted Reaction Type Reactions Azithromycin High 09/12/2012   Systemic Swelling  
 z pack Current Immunizations  Reviewed on 10/23/2017 Name Date Influenza Vaccine (Quad) PF 10/20/2017 Not reviewed this visit Vitals OB Status Smoking Status IUD Passive Smoke Exposure - Never Smoker Your Updated Medication List  
  
   
This list is accurate as of: 10/24/17  3:29 PM.  Always use your most recent med list.  
  
  
  
  
 amoxicillin 500 mg capsule Commonly known as:  AMOXIL Take 1 Cap by mouth three (3) times daily. azelastine 137 mcg (0.1 %) nasal spray Commonly known as:  ASTELIN  
1 Spray by Both Nostrils route two (2) times a day. Use in each nostril as directed  Indications: SEASONAL ALLERGIC RHINITIS buPROPion  mg SR tablet Commonly known as:  [place] Wendy Take 1 Tab by mouth daily. Indications: ANXIETY WITH DEPRESSION  
  
 fluticasone 50 mcg/actuation nasal spray Commonly known as:  Chancy [place] 2 Sprays by Both Nostrils route daily. levocetirizine 5 mg tablet Commonly known as:  Shelvia María Take  by mouth.  
  
 methylPREDNISolone 4 mg Tab Commonly known as:  MEDROL Take 5 tablets day one, take 4 tablets day two, take 3 tablets day three,take 2 tablets day four, take 1 tablet day five. Indications: sinusitis MIRENA 20 mcg/24 hr (5 years) IUD Generic drug:  levonorgestrel 1 Each by IntraUTERine route once. valACYclovir 1 gram tablet Commonly known as:  VALTREX  
2 tablets initially then 2 tablets q 12 hours as needed for lesion  Indications: HERPES LABIALIS  
  
 WOMEN'S ONE DAILY PO Take  by mouth daily. Introducing [facility] & HEALTH SERVICES! Irma Cantrell introduces [email or web address] patient portal. Now you can access parts of your medical record, email your doctor's office, and request medication refills online. 1. In your internet browser, go to [email or web address] 2. Click on the First Time User? Click Here link in the Sign In box. You will see the New Member Sign Up page. 3. Enter your [email or web address] Access Code exactly as it appears below. You will not need to use this code after youve completed the sign-up process. If you do not sign up before the expiration date, you must request a new code. · [email or web address] Access Code: [email or web address] Expires: 1/22/2018  3:29 PM 
 
4. Enter the last four digits of your Social Security Number (xxxx) and Date of Birth (mm/dd/yyyy) as indicated and click Submit. You will be taken to the next sign-up page. 5. Create a [email or web address] ID. This will be your [email or web address] login ID and cannot be changed, so think of one that is secure and easy to remember. 6. Create a [employer].com password. You can change your password at any time. 7. Enter your Password Reset Question and Answer. This can be used at a later time if you forget your password. 8. Enter your e-mail address. You will receive e-mail notification when new information is available in [address] 9. Click Sign Up. You can now view and download portions of your medical record.  
10. Click the Download Summary menu link to download a portable copy of your medical information. If you have questions, please visit the Frequently Asked Questions section of the [employer] website. Remember, [employer] is NOT to be used for urgent needs. For medical emergencies, dial 911. Now available from your iPhone and Android! Please provide this summary of care documentation to your next provider. Your primary care clinician is listed as Paul Chester. If you have any questions after today's visit, please call [phone].

## 2017-10-30 ENCOUNTER — TELEPHONE (OUTPATIENT)
Dept: FAMILY MEDICINE CLINIC | Age: 37
End: 2017-10-30

## 2017-10-30 NOTE — TELEPHONE ENCOUNTER
Spoke with patient. Advised at home treatment. Will hold on allergy shot if still running a fever ain am.  She will call us in am for a same day appointment if needed. To be seen in ER or Urgent Care if symptoms become worse.

## 2017-11-07 ENCOUNTER — CLINICAL SUPPORT (OUTPATIENT)
Dept: FAMILY MEDICINE CLINIC | Age: 37
End: 2017-11-07

## 2017-11-07 DIAGNOSIS — Z51.6 ALLERGY DESENSITIZATION THERAPY: Primary | ICD-10-CM

## 2017-11-07 DIAGNOSIS — J30.89 ENVIRONMENTAL AND SEASONAL ALLERGIES: ICD-10-CM

## 2017-11-07 NOTE — MR AVS SNAPSHOT
Visit Information Date & Time Provider Department Dept. Phone Encounter #  
 11/7/2017  3:00 PM [id number] [phone] [id number] Upcoming Health Maintenance Date Due DTaP/Tdap/Td series (1 - Tdap) 12/21/1999 PAP AKA CERVICAL CYTOLOGY 10/29/2018 Allergies as of 11/7/2017  Review Complete On: 9/26/2017 By: Genoveva Crabtree NP Severity Noted Reaction Type Reactions Azithromycin High 09/12/2012   Systemic Swelling  
 z pack Current Immunizations  Reviewed on 10/23/2017 Name Date Influenza Vaccine (Quad) PF 10/20/2017 Not reviewed this visit You Were Diagnosed With   
  
 Codes Comments Allergy desensitization therapy    -  Primary ICD-10-CM: Z51.6 ICD-9-CM: V07.1 Environmental and seasonal allergies     ICD-10-CM: J30.89 ICD-9-CM: 477.8 Vitals OB Status Smoking Status IUD Passive Smoke Exposure - Never Smoker Preferred Pharmacy Pharmacy Name Phone [address], [facility] [place] [facility] [address] [phone] Your Updated Medication List  
  
   
This list is accurate as of: 11/7/17  3:23 PM.  Always use your most recent med list.  
  
  
  
  
 amoxicillin 500 mg capsule Commonly known as:  AMOXIL Take 1 Cap by mouth three (3) times daily. azelastine 137 mcg (0.1 %) nasal spray Commonly known as:  ASTELIN  
1 Spray by Both Nostrils route two (2) times a day. Use in each nostril as directed  Indications: SEASONAL ALLERGIC RHINITIS buPROPion  mg SR tablet Commonly known as:  Akin Espinosa Take 1 Tab by mouth daily. Indications: ANXIETY WITH DEPRESSION  
  
 fluticasone 50 mcg/actuation nasal spray Commonly known as:  Jose Metro 2 Sprays by Both Nostrils route daily. levocetirizine 5 mg tablet Commonly known as:  Nereida Currie Take  by mouth.  
  
 methylPREDNISolone 4 mg Tab Commonly known as:  MEDROL Take 5 tablets day one, take 4 tablets day two, take 3 tablets day three,take 2 tablets day four, take 1 tablet day five. Indications: sinusitis MIRENA 20 mcg/24 hr (5 years) Iud  
Generic drug:  levonorgestrel 1 Each by IntraUTERine route once. valACYclovir 1 gram tablet Commonly known as:  VALTREX  
2 tablets initially then 2 tablets q 12 hours as needed for lesion  Indications: HERPES LABIALIS  
  
 WOMEN'S ONE DAILY PO Take  by mouth daily. We Performed the Following PA IMMUNOTHERAPY, 2+ INJECTIONS [id number] CPT(R)] Introducing [facility] SERVICES! [employer] introduces OnetoOnetext patient portal. Now you can access parts of your medical record, email your doctor's office, and request medication refills online. 1. In your internet browser, go to [email or web address] 2. Click on the First Time User? Click Here link in the Sign In box. You will see the New Member Sign Up page. 3. Enter your OnetoOnetext Access Code exactly as it appears below. You will not need to use this code after youve completed the sign-up process. If you do not sign up before the expiration date, you must request a new code. · OnetoOnetext Access Code: [email or web address] Expires: 1/22/2018  2:29 PM 
 
4. Enter the last four digits of your Social Security Number (xxxx) and Date of Birth (mm/dd/yyyy) as indicated and click Submit. You will be taken to the next sign-up page. 5. Create a Glowt ID. This will be your OnetoOnetext login ID and cannot be changed, so think of one that is secure and easy to remember. 6. Create a OnetoOnetext password. You can change your password at any time. 7. Enter your Password Reset Question and Answer. This can be used at a later time if you forget your password. 8. Enter your e-mail address. You will receive e-mail notification when new information is available in [address] 9. Click Sign Up. You can now view and download portions of your medical record. 10. Click the Download Summary menu link to download a portable copy of your medical information. If you have questions, please visit the Frequently Asked Questions section of the Agent Partner website. Remember, Agent Partner is NOT to be used for urgent needs. For medical emergencies, dial 911. Now available from your iPhone and Android! Please provide this summary of care documentation to your next provider. Your primary care clinician is listed as Mattie Jones. If you have any questions after today's visit, please call [phone].

## 2017-11-14 ENCOUNTER — CLINICAL SUPPORT (OUTPATIENT)
Dept: FAMILY MEDICINE CLINIC | Age: 37
End: 2017-11-14

## 2017-11-14 DIAGNOSIS — Z51.6 ALLERGY DESENSITIZATION THERAPY: Primary | ICD-10-CM

## 2017-11-14 NOTE — PROGRESS NOTES
S:  Patient has no complaint. Patient is here for allergy injections.     O:  WDWN in NAD    A:  Allergies and is undergoing desensitization therapy  P:  Allergy shot(s) two  given per protocol        Observed for0 minutes        Return to clinic 7 days

## 2017-11-24 ENCOUNTER — OFFICE VISIT (OUTPATIENT)
Dept: FAMILY MEDICINE CLINIC | Age: 37
End: 2017-11-24

## 2017-11-24 ENCOUNTER — TELEPHONE (OUTPATIENT)
Dept: FAMILY MEDICINE CLINIC | Age: 37
End: 2017-11-24

## 2017-11-24 VITALS
HEIGHT: 65 IN | TEMPERATURE: 97.8 F | OXYGEN SATURATION: 94 % | DIASTOLIC BLOOD PRESSURE: 72 MMHG | RESPIRATION RATE: 16 BRPM | WEIGHT: 154.8 LBS | BODY MASS INDEX: 25.79 KG/M2 | SYSTOLIC BLOOD PRESSURE: 98 MMHG | HEART RATE: 90 BPM

## 2017-11-24 DIAGNOSIS — J34.89 NASAL DRAINAGE: ICD-10-CM

## 2017-11-24 DIAGNOSIS — F41.8 DEPRESSION WITH ANXIETY: Primary | ICD-10-CM

## 2017-11-24 DIAGNOSIS — J30.89 ACUTE NON-SEASONAL ALLERGIC RHINITIS, UNSPECIFIED TRIGGER: ICD-10-CM

## 2017-11-24 DIAGNOSIS — R05.9 COUGH: ICD-10-CM

## 2017-11-24 RX ORDER — AZELASTINE 1 MG/ML
1 SPRAY, METERED NASAL 2 TIMES DAILY
Qty: 1 BOTTLE | Refills: 11 | Status: SHIPPED | OUTPATIENT
Start: 2017-11-24 | End: 2018-12-20 | Stop reason: ALTCHOICE

## 2017-11-24 RX ORDER — LEVOCETIRIZINE DIHYDROCHLORIDE 5 MG/1
5 TABLET, FILM COATED ORAL DAILY
Qty: 30 TAB | Refills: 5 | Status: SHIPPED | OUTPATIENT
Start: 2017-11-24 | End: 2019-05-28 | Stop reason: ALTCHOICE

## 2017-11-24 RX ORDER — PROMETHAZINE HYDROCHLORIDE AND CODEINE PHOSPHATE 6.25; 1 MG/5ML; MG/5ML
5 SOLUTION ORAL
Qty: 240 ML | Refills: 0 | Status: SHIPPED | OUTPATIENT
Start: 2017-11-24 | End: 2018-12-20 | Stop reason: ALTCHOICE

## 2017-11-24 RX ORDER — BUPROPION HYDROCHLORIDE 200 MG/1
200 TABLET, EXTENDED RELEASE ORAL DAILY
Qty: 90 TAB | Refills: 1 | Status: SHIPPED | OUTPATIENT
Start: 2017-11-24 | End: 2018-12-20

## 2017-11-24 NOTE — TELEPHONE ENCOUNTER
Patient inform that on Fridays after 12:00 noon ,there will be no provider in the office and we will be unable to give allergy injections.

## 2017-11-24 NOTE — PROGRESS NOTES
Chief Complaint   Patient presents with   Barbarann Delay Eye         HPI:       is a 43 y.o. female. New Issues:  She was seen in the ED Sunday and diagnosed with pink eye, a UTI and a URI. Treated with Macrobid, pyridium and gentamycin optic. She is till not feeling well today. Her eye seemed to get better, but it has flared back up. Symptoms are the worst in the right eye. She is having a lot of pressure on the left side. She can't sleep and is weak and tired. Denies fevers at home. UTI has seemed to resolve. Has been taking Mucinex D, Orin seltzer cold, Tussin. Allergies   Allergen Reactions    Azithromycin Swelling     z pack       Current Outpatient Prescriptions   Medication Sig    OXYMETAZOLINE HCL (AFRIN NO DRIP,OXYMETAZOLIN, NA) by Nasal route.  nitrofurantoin, macrocrystal-monohydrate, (MACROBID) 100 mg capsule Take 1 Cap by mouth two (2) times a day.  phenazopyridine (PYRIDIUM) 200 mg tablet Take 1 Tab by mouth three (3) times daily as needed for Pain.  gentamicin (GARAMYCIN) 0.3 % ophthalmic solution Administer 1 Drop to left eye every four (4) hours.  azelastine (ASTELIN) 137 mcg (0.1 %) nasal spray 1 [place] by Both Nostrils route two (2) times a day. Use in each nostril as directed  Indications: SEASONAL ALLERGIC RHINITIS    buPROPion SR (WELLBUTRIN, ZYBAN) 200 mg SR tablet Take 1 Tab by mouth daily. Indications: ANXIETY WITH DEPRESSION    levocetirizine (XYZAL) 5 mg tablet Take  by mouth.  levonorgestrel (MIRENA) 20 mcg/24 hr (5 years) IUD 1 Each by IntraUTERine route once.  valACYclovir (VALTREX) 1 gram tablet 2 tablets initially then 2 tablets q 12 hours as needed for lesion  Indications: HERPES LABIALIS    MULTIVIT WITH CALCIUM,IRON,MIN (WOMEN'S ONE DAILY PO) Take  by mouth daily.  fluticasone (FLONASE) 50 mcg/actuation nasal spray 2 Sprays by Both Nostrils route daily. No current facility-administered medications for this visit.         Past Medical History:   Diagnosis Date    Anemia NEC     IRON DEF ANEMIA    HX OTHER MEDICAL     BELLS PALSY 2010, CARPEL TUNNEL RIGHT WRIST    Psychiatric problem     DEPRESSION, ANXIETY       Past Surgical History:   Procedure Laterality Date     DELIVERY ONLY      HX OTHER SURGICAL      HERNIA REPAIR (\"AS A CHILD\")       Social History     Social History    Marital status:      Spouse name: N/A    Number of children: N/A    Years of education: N/A     Social History Main Topics    Smoking status: Passive Smoke Exposure - Never Smoker     Types: Cigarettes    Smokeless tobacco: Never Used      Comment:  smokes but not in the house    Alcohol use Yes      Comment: social drinker    Drug use: No    Sexual activity: Yes     Partners: Male     Other Topics Concern    None     Social History Narrative       Family History   Problem Relation Age of Onset    Diabetes Mother     Hypertension Mother     Diabetes Maternal Grandmother     Hypertension Maternal Grandmother     Diabetes Maternal Grandfather     Hypertension Maternal Grandfather     Arthritis-osteo Father        Above history reviewed. ROS:  Denies fever, chills, cough, chest pain, SOB,  nausea, vomiting, or diarrhea. Denies wt loss, wt gain, hemoptysis, hematochezia or melena. Physical Examination:    BP 98/72 (BP 1 Location: Right arm, BP Patient Position: Sitting)  Pulse 90  Temp 97.8 °F (36.6 °C) (Oral)   Resp 16  Ht 5' 5\" (1.651 m)  Wt 154 lb 12.8 oz (70.2 kg)  SpO2 94%  BMI 25.76 kg/m2    General: Alert and Ox3, Fluent speech  HEENT:  PERRLA, sclera mildly erythematous bilaterally with clear stingy drainage. EOM intact, TMs, turbinates, pharynx normal.  No thyromegaly. No cervical adenopathy.   Neck:  Supple, no adenopathy, JVD, mass or bruit  Chest:  Clear to Ausculation, without wheezes, rales, rubs or ronchi  Cardiac: RRR  Extremities:  No cyanosis, clubbing or edema  Neurologic:  Ambulatory without assist, CN 2-12 grossly intact. Moves all extremities. Skin: no rash  Lymphadenopathy: no cervical or supraclavicular nodes    ASSESSMENT AND PLAN:     1. Depression with anxiety  Good control  Continue current regimen   Refilling  - buPROPion SR (WELLBUTRIN, ZYBAN) 200 mg SR tablet; Take 1 Tab by mouth daily. Indications: ANXIETY WITH DEPRESSION  Dispense: 90 Tab; Refill: 1    2. Acute non-seasonal allergic rhinitis, unspecified trigger  Restart allergy medication  May use Nasonex OTC  Increase fluids  - levocetirizine (XYZAL) 5 mg tablet; Take 1 Tab by mouth daily. Dispense: 30 Tab; Refill: 5    3. Cough  Start cough syrup at night  - promethazine-codeine (PHENERGAN WITH CODEINE) 6.25-10 mg/5 mL syrup; Take 5 mL by mouth nightly as needed for Cough. Max Daily Amount: 5 mL. Indications: Cough  Dispense: 240 mL; Refill: 0    4. Nasal drainage  Refilling  - azelastine (ASTELIN) 137 mcg (0.1 %) nasal spray; 1 [place] by Both Nostrils route two (2) times a day. Use in each nostril as directed  Indications: SEASONAL ALLERGIC RHINITIS  Dispense: 1 Bottle;  Refill: 11     RTC PRN    Carmen Ivan NP

## 2017-11-24 NOTE — PATIENT INSTRUCTIONS
Allergies: Care Instructions  Your Care Instructions    Allergies occur when your body's defense system (immune system) overreacts to certain substances. The immune system treats a harmless substance as if it were a harmful germ or virus. Many things can cause this overreaction, including pollens, medicine, food, dust, animal dander, and mold. Allergies can be mild or severe. Mild allergies can be managed with home treatment. But medicine may be needed to prevent problems. Managing your allergies is an important part of staying healthy. Your doctor may suggest that you have allergy testing to help find out what is causing your allergies. When you know what things trigger your symptoms, you can avoid them. This can prevent allergy symptoms and other health problems. For severe allergies that cause reactions that affect your whole body (anaphylactic reactions), your doctor may prescribe a shot of epinephrine to carry with you in case you have a severe reaction. Learn how to give yourself the shot and keep it with you at all times. Make sure it is not . Follow-up care is a key part of your treatment and safety. Be sure to make and go to all appointments, and call your doctor if you are having problems. It's also a good idea to know your test results and keep a list of the medicines you take. How can you care for yourself at home? · If you have been told by your doctor that dust or dust mites are causing your allergy, decrease the dust around your bed:  [facility] sheets, pillowcases, and other bedding in hot water every week. ¨ Use dust-proof covers for pillows, duvets, and mattresses. Avoid plastic covers because they tear easily and do not \"breathe. \" Wash as instructed on the label. ¨ Do not use any blankets and pillows that you do not need. ¨ Use blankets that you can wash in your washing machine. ¨ Consider removing drapes and carpets, which attract and hold dust, from your bedroom.   · If you are allergic to house dust and mites, do not use home humidifiers. Your doctor can suggest ways you can control dust and mites. · Look for signs of cockroaches. Cockroaches cause allergic reactions. Use cockroach baits to get rid of them. Then, clean your home well. Cockroaches like areas where grocery bags, newspapers, empty bottles, or cardboard boxes are stored. Do not keep these inside your home, and keep trash and food containers sealed. Seal off any spots where cockroaches might enter your home. · If you are allergic to mold, get rid of furniture, rugs, and drapes that smell musty. Check for mold in the bathroom. · If you are allergic to outdoor pollen or mold spores, use air-conditioning. Change or clean all filters every month. Keep windows closed. · If you are allergic to pollen, stay inside when pollen counts are high. Use a vacuum  with a HEPA filter or a double-thickness filter at least two times each week. · Stay inside when air pollution is bad. Avoid paint fumes, perfumes, and other strong odors. · Avoid conditions that make your allergies worse. Stay away from smoke. Do not smoke or let anyone else smoke in your house. Do not use fireplaces or wood-burning stoves. · If you are allergic to your pets, change the air filter in your furnace every month. Use high-efficiency filters. · If you are allergic to pet dander, keep pets outside or out of your bedroom. Old carpet and cloth furniture can hold a lot of animal dander. You may need to replace them. When should you call for help? Give an epinephrine shot if:  ? · You think you are having a severe allergic reaction. ? · You have symptoms in more than one body area, such as mild nausea and an itchy mouth. ? After giving an epinephrine shot call 911, even if you feel better. ?Call 911 if:  ? · You have symptoms of a severe allergic reaction. These may include:  ¨ Sudden raised, red areas (hives) all over your body.   ¨ Swelling of the throat, mouth, lips, or tongue. ¨ Trouble breathing. ¨ Passing out (losing consciousness). Or you may feel very lightheaded or suddenly feel weak, confused, or restless. ? · You have been given an epinephrine shot, even if you feel better. ?Call your doctor now or seek immediate medical care if:  ? · You have symptoms of an allergic reaction, such as:  ¨ A rash or hives (raised, red areas on the skin). ¨ Itching. ¨ Swelling. ¨ Belly pain, nausea, or vomiting. ? Watch closely for changes in your health, and be sure to contact your doctor if:  ? · You do not get better as expected. Where can you learn more? Go to http://edie-claudia.info/. Enter B957 in the search box to learn more about \"Allergies: Care Instructions. \"  Current as of: September 29, 2016  Content Version: 11.4  © 0443-7284 [employer]. Care instructions adapted under license by [employer] (which disclaims liability or warranty for this information). If you have questions about a medical condition or this instruction, always ask your healthcare professional. [place] [address] any warranty or liability for your use of this information. Rhinitis: Care Instructions  Your Care Instructions  Rhinitis is swelling and irritation in the nose. Allergies and infections are often the cause. Your nose may run or feel stuffy. Other symptoms are itchy and sore eyes, ears, throat, and mouth. If allergies are the cause, your doctor may do tests to find out what you are allergic to. You may be able to stop symptoms if you avoid the things that cause them. Your doctor may suggest or prescribe medicine to ease your symptoms. Follow-up care is a key part of your treatment and safety. Be sure to make and go to all appointments, and call your doctor if you are having problems. It's also a good idea to know your test results and keep a list of the medicines you take.   How can you care for yourself at home? · If your rhinitis is caused by allergies, try to find out what sets off (triggers) your symptoms. Take steps to avoid these triggers. ¨ Avoid yard work. It can stir up both pollen and mold. ¨ Do not smoke or allow others to smoke around you. If you need help quitting, talk to your doctor about stop-smoking programs and medicines. These can increase your chances of quitting for good. ¨ Do not use aerosol sprays, cleaning products, or perfumes. ¨ If pollen is one of your triggers, close your house and car windows during blooming season. ¨ Clean your house often to control dust.  ¨ Keep pets outside. · If your doctor recommends over-the-counter medicines to relieve symptoms, take your medicines exactly as prescribed. Call your doctor if you think you are having a problem with your medicine. · Use saline (saltwater) nasal washes to help keep your nasal passages open and wash out mucus and bacteria. You can buy saline nose drops at a grocery store or drugstore. Or you can make your own at home by adding 1 teaspoon of salt and 1 teaspoon of baking soda to 2 cups of distilled water. If you make your own, fill a bulb syringe with the solution, insert the tip into your nostril, and squeeze gently. [place] your nose. When should you call for help? Call your doctor now or seek immediate medical care if:  ? · You are having trouble breathing. ? Watch closely for changes in your health, and be sure to contact your doctor if:  ? · Mucus from your nose gets thicker (like pus) or has new blood in it. ? · You have new or worse symptoms. ? · You do not get better as expected. Where can you learn more? Go to http://edie-claudia.info/. Enter M030 in the search box to learn more about \"Rhinitis: Care Instructions. \"  Current as of: May 12, 2017  Content Version: 11.4  © 6852-8014 [employer].  Care instructions adapted under license by [employer] (which disclaims liability or warranty for this information). If you have questions about a medical condition or this instruction, always ask your healthcare professional. [address] any warranty or liability for your use of this information.

## 2017-11-24 NOTE — MR AVS SNAPSHOT
Visit Information Date & Time Provider Department Dept. Phone Encounter #  
 11/24/2017  3:15 PM Bhumi Age, NP [address] [id number] Upcoming Health Maintenance Date Due DTaP/Tdap/Td series (1 - Tdap) 12/21/1999 PAP AKA CERVICAL CYTOLOGY 10/29/2018 Allergies as of 11/24/2017  Review Complete On: 11/24/2017 By: Bhumi Age, NP Severity Noted Reaction Type Reactions Azithromycin High 09/12/2012   Systemic Swelling  
 z pack Current Immunizations  Reviewed on 10/23/2017 Name Date Influenza Vaccine (Quad) PF 10/20/2017 Not reviewed this visit You Were Diagnosed With   
  
 Codes Comments Depression with anxiety    -  Primary ICD-10-CM: F41.8 ICD-9-CM: 300.4 Acute non-seasonal allergic rhinitis, unspecified trigger     ICD-10-CM: J30.89 ICD-9-CM: 477.9 Cough     ICD-10-CM: R05 ICD-9-CM: 786.2 Nasal drainage     ICD-10-CM: J34.89 ICD-9-CM: 478.19 Vitals BP Pulse Temp Resp Height(growth percentile) Weight(growth percentile) 98/72 (BP 1 Location: Right arm, BP Patient Position: Sitting) 90 97.8 °F (36.6 °C) (Oral) 16 5' 5\" (1.651 m) 154 lb 12.8 oz (70.2 kg) SpO2 BMI OB Status Smoking Status 94% 25.76 kg/m2 IUD Passive Smoke Exposure - Never Smoker BMI and BSA Data Body Mass Index Body Surface Area 25.76 kg/m 2 1.79 m 2 Preferred Pharmacy Pharmacy Name Phone [address], [facility] [place] Theo [address]clean [place] [phone] Your Updated Medication List  
  
   
This list is accurate as of: 11/24/17  3:55 PM.  Always use your most recent med list.  
  
  
  
  
 AFRIN NO DRIP(OXYMETAZOLIN) NA  
by Nasal route. azelastine 137 mcg (0.1 %) nasal spray Commonly known as:  ASTELIN  
1 Spray by Both Nostrils route two (2) times a day. Use in each nostril as directed  Indications: SEASONAL ALLERGIC RHINITIS buPROPion  mg SR tablet Commonly known as:  Libra Dimmorris Take 1 Tab by mouth daily. Indications: ANXIETY WITH DEPRESSION  
  
 fluticasone 50 mcg/actuation nasal spray Commonly known as:  Sherry Fetch 2 Sprays by Both Nostrils route daily. gentamicin 0.3 % ophthalmic solution Commonly known as:  GARAMYCIN Administer 1 Drop to left eye every four (4) hours. levocetirizine 5 mg tablet Commonly known as:  Divya Roch Take 1 Tab by mouth daily. MIRENA 20 mcg/24 hr (5 years) Iud  
Generic drug:  levonorgestrel 1 Each by IntraUTERine route once. nitrofurantoin (macrocrystal-monohydrate) 100 mg capsule Commonly known as:  MACROBID Take 1 Cap by mouth two (2) times a day. phenazopyridine 200 mg tablet Commonly known as:  PYRIDIUM Take 1 Tab by mouth three (3) times daily as needed for Pain. promethazine-codeine 6.25-10 mg/5 mL syrup Commonly known as:  PHENERGAN with CODEINE Take 5 mL by mouth nightly as needed for Cough. Max Daily Amount: 5 mL. Indications: Cough  
  
 valACYclovir 1 gram tablet Commonly known as:  VALTREX  
2 tablets initially then 2 tablets q 12 hours as needed for lesion  Indications: HERPES LABIALIS  
  
 WOMEN'S ONE DAILY PO Take  by mouth daily. Prescriptions Printed Refills  
 promethazine-codeine (PHENERGAN WITH CODEINE) 6.25-10 mg/5 mL syrup 0 Sig: Take 5 mL by mouth nightly as needed for Cough. Max Daily Amount: 5 mL. Indications: Cough Class: Print Route: Oral  
  
Prescriptions Sent to Pharmacy Refills  
 azelastine (ASTELIN) 137 mcg (0.1 %) nasal spray 11 [place] by Both Nostrils route two (2) times a day. Use in each nostril as directed  Indications: SEASONAL ALLERGIC RHINITIS Class: Normal  
 [facility] [address] [address] [facility]: [phone] Route:  Both Nostrils  
 levocetirizine (XYZAL) 5 mg tablet 5  
 Sig: Take 1 Tab by mouth daily. Class: Normal  
 Pharmacy: [address], [address] [place] [address] Ph #: [phone] Route: Oral  
 buPROPion SR (WELLBUTRIN, ZYBAN) 200 mg SR tablet 1 Sig: Take 1 Tab by mouth daily. Indications: ANXIETY WITH DEPRESSION Class: Normal  
 Pharmacy: [address], [address] [place] [address] Ph #: [phone] Route: Oral  
  
Introducing \[facility] [place] [facility]\"" SERVICES! Traci Perez introduces [employer] patient portal. Now you can access parts of your medical record, email your doctor's office, and request medication refills online. 1. In your internet browser, go to [email or web address] 2. Click on the First Time User? Click Here link in the Sign In box. You will see the New Member Sign Up page. 3. Enter your [employer] Access Code exactly as it appears below. You will not need to use this code after youve completed the sign-up process. If you do not sign up before the expiration date, you must request a new code. · [employer] Access Code: [email or web address] Expires: 1/22/2018  2:29 PM 
 
4. Enter the last four digits of your Social Security Number (xxxx) and Date of Birth (mm/dd/yyyy) as indicated and click Submit. You will be taken to the next sign-up page. 5. Create a [employer] ID. This will be your [employer] login ID and cannot be changed, so think of one that is secure and easy to remember. 6. Create a [employer] password. You can change your password at any time. 7. Enter your Password Reset Question and Answer. This can be used at a later time if you forget your password. 8. Enter your e-mail address. You will receive e-mail notification when new information is available in [address] 9. Click Sign Up. You can now view and download portions of your medical record. 10. Click the Download Summary menu link to download a portable copy of your medical information. If you have questions, please visit the Frequently Asked Questions section of the [email or web address] website. Remember, [email or web address] is NOT to be used for urgent needs. For medical emergencies, dial 911. Now available from your iPhone and Android! Please provide this summary of care documentation to your next provider. Your primary care clinician is listed as Yasmany Marie. If you have any questions after today's visit, please call [phone].

## 2017-12-05 ENCOUNTER — CLINICAL SUPPORT (OUTPATIENT)
Dept: FAMILY MEDICINE CLINIC | Age: 37
End: 2017-12-05

## 2017-12-05 DIAGNOSIS — Z51.6 ALLERGY DESENSITIZATION THERAPY: Primary | ICD-10-CM

## 2017-12-05 NOTE — MR AVS SNAPSHOT
Visit Information Date & Time Provider Department Dept. Phone Encounter #  
 12/5/2017  4:00 PM [address] [phone] [id number] Upcoming Health Maintenance Date Due DTaP/Tdap/Td series (1 - Tdap) 12/21/1999 PAP AKA CERVICAL CYTOLOGY 10/29/2018 Allergies as of 12/5/2017  Review Complete On: 11/24/2017 By: Raquel Delgadillo NP Severity Noted Reaction Type Reactions Azithromycin High 09/12/2012   Systemic Swelling  
 z pack Current Immunizations  Reviewed on 10/23/2017 Name Date Influenza Vaccine (Quad) PF 10/20/2017 Not reviewed this visit Vitals OB Status Smoking Status IUD Passive Smoke Exposure - Never Smoker Your Updated Medication List  
  
   
This list is accurate as of: 12/5/17 11:59 PM.  Always use your most recent med list.  
  
  
  
  
 Stacie Caseyornedson NO DRIP(OXYMETAZOLIN) NA  
by Nasal route. azelastine 137 mcg (0.1 %) nasal spray Commonly known as:  ASTELIN  
1 Spray by Both Nostrils route two (2) times a day. Use in each nostril as directed  Indications: SEASONAL ALLERGIC RHINITIS buPROPion  mg SR tablet Commonly known as:  Erroll Gell Take 1 Tab by mouth daily. Indications: ANXIETY WITH DEPRESSION  
  
 fluticasone 50 mcg/actuation nasal spray Commonly known as:  [facility] Cyphers 2 Sprays by Both Nostrils route daily. gentamicin 0.3 % ophthalmic solution Commonly known as:  GARAMYCIN Administer 1 Drop to left eye every four (4) hours. levocetirizine 5 mg tablet Commonly known as:  Mellisa Hemp Take 1 Tab by mouth daily. MIRENA 20 mcg/24 hr (5 years) Iud  
Generic drug:  levonorgestrel 1 Each by IntraUTERine route once. nitrofurantoin (macrocrystal-monohydrate) 100 mg capsule Commonly known as:  MACROBID Take 1 Cap by mouth two (2) times a day. phenazopyridine 200 mg tablet Commonly known as:  PYRIDIUM  
 Take 1 Tab by mouth three (3) times daily as needed for Pain. promethazine-codeine 6.25-10 mg/5 mL syrup Commonly known as:  PHENERGAN with CODEINE Take 5 mL by mouth nightly as needed for Cough. Max Daily Amount: 5 mL. Indications: Cough  
  
 valACYclovir 1 gram tablet Commonly known as:  VALTREX  
2 tablets initially then 2 tablets q 12 hours as needed for lesion  Indications: HERPES LABIALIS  
  
 WOMEN'S ONE DAILY PO Take  by mouth daily. Introducing [address]! David Olsen introduces [employer] patient portal. Now you can access parts of your medical record, email your doctor's office, and request medication refills online. 1. In your internet browser, go to [email or web address] 2. Click on the First Time User? Click Here link in the Sign In box. You will see the New Member Sign Up page. 3. Enter your Remark [employer] Access Code exactly as it appears below. You will not need to use this code after youve completed the sign-up process. If you do not sign up before the expiration date, you must request a new code. · Remark [employer] Access Code: [email or web address] Expires: 1/22/2018  2:29 PM 
 
4. Enter the last four digits of your Social Security Number (xxxx) and Date of Birth (mm/dd/yyyy) as indicated and click Submit. You will be taken to the next sign-up page. 5. Create a Remark Media ID. This will be your Remark Media login ID and cannot be changed, so think of one that is secure and easy to remember. 6. Create a Remark Media password. You can change your password at any time. 7. Enter your Password Reset Question and Answer. This can be used at a later time if you forget your password. 8. Enter your e-mail address. You will receive e-mail notification when new information is available in [address] 9. Click Sign Up. You can now view and download portions of your medical record. 10. Click the Download Summary menu link to download a portable copy of your medical information. If you have questions, please visit the Frequently Asked Questions section of the [employer] website. Remember, [employer] is NOT to be used for urgent needs. For medical emergencies, dial 911. Now available from your iPhone and Android! Please provide this summary of care documentation to your next provider. Your primary care clinician is listed as Chase Valdes. If you have any questions after today's visit, please call [phone].

## 2017-12-28 ENCOUNTER — CLINICAL SUPPORT (OUTPATIENT)
Dept: FAMILY MEDICINE CLINIC | Age: 37
End: 2017-12-28

## 2017-12-28 DIAGNOSIS — Z51.6 ALLERGY DESENSITIZATION THERAPY: Primary | ICD-10-CM

## 2017-12-28 DIAGNOSIS — J30.89 ENVIRONMENTAL AND SEASONAL ALLERGIES: ICD-10-CM

## 2017-12-28 NOTE — MR AVS SNAPSHOT
Visit Information Date & Time Provider Department Dept. Phone Encounter #  
 12/28/2017  4:45 PM [address] [phone] [id number] Your Appointments 12/28/2017  4:45 PM  
Nurse Visit with [address] ([facility]) Appt Note: Allergy shot/2nd try [address] [address]  
[address] [address] Upcoming Health Maintenance Date Due DTaP/Tdap/Td series (1 - Tdap) 12/21/1999 PAP AKA CERVICAL CYTOLOGY 10/29/2018 Allergies as of 12/28/2017  Review Complete On: 11/24/2017 By: Virginia Goyal NP Severity Noted Reaction Type Reactions Azithromycin High 09/12/2012   Systemic Swelling  
 z pack Current Immunizations  Reviewed on 10/23/2017 Name Date Influenza Vaccine (Quad) PF 10/20/2017 Not reviewed this visit Vitals OB Status Smoking Status IUD Passive Smoke Exposure - Never Smoker Your Updated Medication List  
  
   
This list is accurate as of: 12/28/17  4:28 PM.  Always use your most recent med list.  
  
  
  
  
 Nhung Begin NO DRIP(OXYMETAZOLIN) NA  
by Nasal route. azelastine 137 mcg (0.1 %) nasal spray Commonly known as:  ASTELIN  
1 Spray by Both Nostrils route two (2) times a day. Use in each nostril as directed  Indications: SEASONAL ALLERGIC RHINITIS buPROPion  mg SR tablet Commonly known as:  Bijanetta Chase Take 1 Tab by mouth daily. Indications: ANXIETY WITH DEPRESSION  
  
 fluticasone 50 mcg/actuation nasal spray Commonly known as:  Oren Sacks 2 Sprays by Both Nostrils route daily. gentamicin 0.3 % ophthalmic solution Commonly known as:  GARAMYCIN Administer 1 Drop to left eye every four (4) hours. levocetirizine 5 mg tablet Commonly known as:  Adrien De La Torre Take 1 Tab by mouth daily. MIRENA 20 mcg/24 hr (5 years) Iud  
Generic drug:  levonorgestrel 1 Each by IntraUTERine route once. nitrofurantoin (macrocrystal-monohydrate) 100 mg capsule Commonly known as:  MACROBID Take 1 Cap by mouth two (2) times a day. phenazopyridine 200 mg tablet Commonly known as:  PYRIDIUM Take 1 Tab by mouth three (3) times daily as needed for Pain. promethazine-codeine 6.25-10 mg/5 mL syrup Commonly known as:  PHENERGAN with CODEINE Take 5 mL by mouth nightly as needed for Cough. Max Daily Amount: 5 mL. Indications: Cough  
  
 valACYclovir 1 gram tablet Commonly known as:  VALTREX  
2 tablets initially then 2 tablets q 12 hours as needed for lesion  Indications: HERPES LABIALIS  
  
 WOMEN'S ONE DAILY PO Take  by mouth daily. Introducing [facility] & HE[facility] SERVICES! Elinor Foreman introduces [employer] patient portal. Now you can access parts of your medical record, email your doctor's office, and request medication refills online. 1. In your internet browser, go to [email or web address] 2. Click on the First Time User? Click Here link in the Sign In box. You will see the New Member Sign Up page. 3. Enter your [employer] Access Code exactly as it appears below. You will not need to use this code after youve completed the sign-up process. If you do not sign up before the expiration date, you must request a new code. · [employer] Access Code: [email or web address] Expires: 1/22/2018  2:29 PM 
 
4. Enter the last four digits of your Social Security Number (xxxx) and Date of Birth (mm/dd/yyyy) as indicated and click Submit. You will be taken to the next sign-up page. 5. Create a [employer]t ID. This will be your [employer] login ID and cannot be changed, so think of one that is secure and easy to remember. 6. Create a [employer] password. You can change your password at any time. 7. Enter your Password Reset Question and Answer.  This can be used at a later time if you forget your password. 8. Enter your e-mail address. You will receive e-mail notification when new information is available in [address] 9. Click Sign Up. You can now view and download portions of your medical record. 10. Click the Download Summary menu link to download a portable copy of your medical information. If you have questions, please visit the Frequently Asked Questions section of the [employer] website. Remember, [employer] is NOT to be used for urgent needs. For medical emergencies, dial 911. Now available from your iPhone and Android! Please provide this summary of care documentation to your next provider. Your primary care clinician is listed as Karli Reed. If you have any questions after today's visit, please call [phone].

## 2018-01-02 ENCOUNTER — CLINICAL SUPPORT (OUTPATIENT)
Dept: FAMILY MEDICINE CLINIC | Age: 38
End: 2018-01-02

## 2018-01-02 DIAGNOSIS — Z51.6 ALLERGY DESENSITIZATION THERAPY: Primary | ICD-10-CM

## 2018-01-02 NOTE — PROGRESS NOTES
S:  Patient has no complaint. Patient is here for allergy injections.     O:  WDWN in NAD    A:  Allergies and is undergoing desensitization therapy  P:  Allergy shot(s) given per protocol        Observed for 0minutes        Return to clinic 7 days

## 2018-01-02 NOTE — MR AVS SNAPSHOT
Visit Information Date & Time Provider Department Dept. Phone Encounter #  
 1/2/2018  4:30 PM [address] [phone] [id number] Upcoming Health Maintenance Date Due DTaP/Tdap/Td series (1 - Tdap) 12/21/1999 PAP AKA CERVICAL CYTOLOGY 10/29/2018 Allergies as of 1/2/2018  Review Complete On: 11/24/2017 By: Thaddeus Richter NP Severity Noted Reaction Type Reactions Azithromycin High 09/12/2012   Systemic Swelling  
 z pack Current Immunizations  Reviewed on 10/23/2017 Name Date Influenza Vaccine (Quad) PF 10/20/2017 Not reviewed this visit You Were Diagnosed With   
  
 Codes Comments Allergy desensitization therapy    -  Primary ICD-10-CM: Z51.6 ICD-9-CM: V07.1 Vitals OB Status Smoking Status IUD Passive Smoke Exposure - Never Smoker Preferred Pharmacy Pharmacy Name Phone [address], [address] [place] [address] Theo Villalobos [phone] Your Updated Medication List  
  
   
This list is accurate as of: 1/2/18  4:36 PM.  Always use your most recent med list.  
  
  
  
  
 Cesia Easley NO DRIP(OXYMETAZOLIN) NA  
by Nasal route. azelastine 137 mcg (0.1 %) nasal spray Commonly known as:  ASTELIN  
1 Spray by Both Nostrils route two (2) times a day. Use in each nostril as directed  Indications: SEASONAL ALLERGIC RHINITIS buPROPion  mg SR tablet Commonly known as:  Valeriy Peal Take 1 Tab by mouth daily. Indications: ANXIETY WITH DEPRESSION  
  
 fluticasone 50 mcg/actuation nasal spray Commonly known as:  Junior Goody 2 Sprays by Both Nostrils route daily. gentamicin 0.3 % ophthalmic solution Commonly known as:  GARAMYCIN Administer 1 Drop to left eye every four (4) hours. levocetirizine 5 mg tablet Commonly known as:  [address] Take 1 Tab by mouth daily.   
  
 MIRENA 20 mcg/24 hr (5 years) Iud  
 Generic drug:  levonorgestrel 1 Each by IntraUTERine route once. nitrofurantoin (macrocrystal-monohydrate) 100 mg capsule Commonly known as:  MACROBID Take 1 Cap by mouth two (2) times a day. phenazopyridine 200 mg tablet Commonly known as:  PYRIDIUM Take 1 Tab by mouth three (3) times daily as needed for Pain. promethazine-codeine 6.25-10 mg/5 mL syrup Commonly known as:  PHENERGAN with CODEINE Take 5 mL by mouth nightly as needed for Cough. Max Daily Amount: 5 mL. Indications: Cough  
  
 valACYclovir 1 gram tablet Commonly known as:  VALTREX  
2 tablets initially then 2 tablets q 12 hours as needed for lesion  Indications: HERPES LABIALIS  
  
 WOMEN'S ONE DAILY PO Take  by mouth daily. Introducing [facility] & HEALTH SERVICES! [facility] introduces [employer] patient portal. Now you can access parts of your medical record, email your doctor's office, and request medication refills online. 1. In your internet browser, go to [email or web address] 2. Click on the First Time User? Click Here link in the Sign In box. You will see the New Member Sign Up page. 3. Enter your Pickie Access Code exactly as it appears below. You will not need to use this code after youve completed the sign-up process. If you do not sign up before the expiration date, you must request a new code. · Pickie Access Code: [email or web address] Expires: 1/22/2018  2:29 PM 
 
4. Enter the last four digits of your Social Security Number (xxxx) and Date of Birth (mm/dd/yyyy) as indicated and click Submit. You will be taken to the next sign-up page. 5. Create a Woven Inct ID. This will be your Pickie login ID and cannot be changed, so think of one that is secure and easy to remember. 6. Create a Woven Inct password. You can change your password at any time. 7. Enter your Password Reset Question and Answer. This can be used at a later time if you forget your password. 8. Enter your e-mail address. You will receive e-mail notification when new information is available in [address] 9. Click Sign Up. You can now view and download portions of your medical record. 10. Click the Download Summary menu link to download a portable copy of your medical information. If you have questions, please visit the Frequently Asked Questions section of the sofatronic website. Remember, [employer] is NOT to be used for urgent needs. For medical emergencies, dial 911. Now available from your iPhone and Android! Please provide this summary of care documentation to your next provider. Your primary care clinician is listed as Kiarra Roman. If you have any questions after today's visit, please call [phone].

## 2018-01-09 ENCOUNTER — CLINICAL SUPPORT (OUTPATIENT)
Dept: FAMILY MEDICINE CLINIC | Age: 38
End: 2018-01-09

## 2018-01-09 DIAGNOSIS — Z51.6 ALLERGY DESENSITIZATION THERAPY: Primary | ICD-10-CM

## 2018-01-09 NOTE — PROGRESS NOTES
S:  Patient has no complaint. Patient is here for allergy injections. O:  WDWN in NAD    A:  Allergies and is undergoing desensitization therapy  P:  Allergy shot(s) two given per protocol        Observed for 0minutes        Return to clinic 7 days.

## 2018-01-16 ENCOUNTER — CLINICAL SUPPORT (OUTPATIENT)
Dept: FAMILY MEDICINE CLINIC | Age: 38
End: 2018-01-16

## 2018-01-16 DIAGNOSIS — J30.1 NON-SEASONAL ALLERGIC RHINITIS DUE TO POLLEN, UNSPECIFIED CHRONICITY: ICD-10-CM

## 2018-01-16 DIAGNOSIS — Z51.6 DESENSITIZATION TO ALLERGENS: ICD-10-CM

## 2018-01-16 NOTE — MR AVS SNAPSHOT
[address] 
[phone] Patient: Neo Meyers MRN:  :[id number] Visit Information Date & Time Provider Department Dept. Phone Encounter #  
 2018  3:45 PM [address] [phone] [id number] Upcoming Health Maintenance Date Due DTaP/Tdap/Td series (1 - Tdap) 1999 PAP AKA CERVICAL CYTOLOGY 10/29/2018 Allergies as of 2018  Review Complete On: 2017 By: Thaddeus Richter NP Severity Noted Reaction Type Reactions Azithromycin High 2012   Systemic Swelling  
 z pack Current Immunizations  Reviewed on 10/23/2017 Name Date Influenza Vaccine (Quad) PF 10/20/2017 Not reviewed this visit You Were Diagnosed With   
  
 Codes Comments Non-seasonal allergic rhinitis due to pollen, unspecified chronicity     ICD-10-CM: J30.1 ICD-9-CM: 477.0 Desensitization to allergens     ICD-10-CM: Z51.6 ICD-9-CM: V07.1 Vitals OB Status Smoking Status IUD Passive Smoke Exposure - Never Smoker Preferred Pharmacy Pharmacy Name Phone [address], [facility] [place] Theo Villalobos [phone] Your Updated Medication List  
  
   
This list is accurate as of: 18  4:39 PM.  Always use your most recent med list.  
  
  
  
  
 Cesia Easley NO DRIP(OXYMETAZOLIN) NA  
by Nasal route. azelastine 137 mcg (0.1 %) nasal spray Commonly known as:  ASTELIN  
1 Spray by Both Nostrils route two (2) times a day. Use in each nostril as directed  Indications: SEASONAL ALLERGIC RHINITIS buPROPion  mg SR tablet Commonly known as:  Valeriy Peal Take 1 Tab by mouth daily. Indications: ANXIETY WITH DEPRESSION  
  
 fluticasone 50 mcg/actuation nasal spray Commonly known as:  Junior Goody 2 Sprays by Both Nostrils route daily. gentamicin 0.3 % ophthalmic solution Commonly known as:  GARAMYCIN Administer 1 Drop to left eye every four (4) hours. levocetirizine 5 mg tablet Commonly known as:  Carmelobarb Asif Take 1 Tab by mouth daily. MIRENA 20 mcg/24 hr (5 years) Iud  
Generic drug:  levonorgestrel 1 Each by IntraUTERine route once. nitrofurantoin (macrocrystal-monohydrate) 100 mg capsule Commonly known as:  MACROBID Take 1 Cap by mouth two (2) times a day. phenazopyridine 200 mg tablet Commonly known as:  PYRIDIUM Take 1 Tab by mouth three (3) times daily as needed for Pain. promethazine-codeine 6.25-10 mg/5 mL syrup Commonly known as:  PHENERGAN with CODEINE Take 5 mL by mouth nightly as needed for Cough. Max Daily Amount: 5 mL. Indications: Cough  
  
 valACYclovir 1 gram tablet Commonly known as:  VALTREX  
2 tablets initially then 2 tablets q 12 hours as needed for lesion  Indications: HERPES LABIALIS  
  
 WOMEN'S ONE DAILY PO Take  by mouth daily. We Performed the Following CO IMMUNOTHERAPY, 2+ INJECTIONS [id number] CPT(R)] Introducing [facility] SERVICES! [employer] introduces [employer] patient portal. Now you can access parts of your medical record, email your doctor's office, and request medication refills online. 1. In your internet browser, go to [email or web address] 2. Click on the First Time User? Click Here link in the Sign In box. You will see the New Member Sign Up page. 3. Enter your [employer] Access Code exactly as it appears below. You will not need to use this code after youve completed the sign-up process. If you do not sign up before the expiration date, you must request a new code. · [employer] Access Code: [email or web address] Expires: 1/22/2018  2:29 PM 
 
4. Enter the last four digits of your Social Security Number (xxxx) and Date of Birth (mm/dd/yyyy) as indicated and click Submit. You will be taken to the next sign-up page. 5. Create a Gyft ID. This will be your Gyft login ID and cannot be changed, so think of one that is secure and easy to remember. 6. Create a Gyft password. You can change your password at any time. 7. Enter your Password Reset Question and Answer. This can be used at a later time if you forget your password. 8. Enter your e-mail address. You will receive e-mail notification when new information is available in [address] 9. Click Sign Up. You can now view and download portions of your medical record. 10. Click the Download Summary menu link to download a portable copy of your medical information. If you have questions, please visit the Frequently Asked Questions section of the Gyft website. Remember, Gyft is NOT to be used for urgent needs. For medical emergencies, dial 911. Now available from your iPhone and Android! Please provide this summary of care documentation to your next provider. Your primary care clinician is listed as Mayuri Pittman. If you have any questions after today's visit, please call [phone].

## 2018-01-23 ENCOUNTER — CLINICAL SUPPORT (OUTPATIENT)
Dept: FAMILY MEDICINE CLINIC | Age: 38
End: 2018-01-23

## 2018-01-23 DIAGNOSIS — J30.1 NON-SEASONAL ALLERGIC RHINITIS DUE TO POLLEN, UNSPECIFIED CHRONICITY: ICD-10-CM

## 2018-01-23 DIAGNOSIS — Z51.6 DESENSITIZATION TO ALLERGENS: ICD-10-CM

## 2018-01-23 NOTE — MR AVS SNAPSHOT
[address] [place] [address] 
[phone] Patient: Jerome Bañuelos MRN:  :[id number] Visit Information Date & Time Provider Department Dept. Phone Encounter #  
 2018  4:45 PM [facility] [address] [facility] [place] [facility] [phone] [id number] Upcoming Health Maintenance Date Due DTaP/Tdap/Td series (1 - Tdap) 1999 PAP AKA CERVICAL CYTOLOGY 10/29/2018 Allergies as of 2018  Review Complete On: 2017 By: Gisela Slade NP Severity Noted Reaction Type Reactions Azithromycin High 2012   Systemic Swelling  
 z pack Current Immunizations  Reviewed on 10/23/2017 Name Date Influenza Vaccine (Quad) PF 10/20/2017 Not reviewed this visit Vitals OB Status Smoking Status IUD Passive Smoke Exposure - Never Smoker Your Updated Medication List  
  
   
This list is accurate as of: 18 11:59 PM.  Always use your most recent med list.  
  
  
  
  
 AFRIN NO DRIP(OXYMETAZOLIN) NA  
by Nasal route. azelastine 137 mcg (0.1 %) nasal spray Commonly known as:  ASTELIN  
1 Spray by Both Nostrils route two (2) times a day. Use in each nostril as directed  Indications: SEASONAL ALLERGIC RHINITIS buPROPion  mg SR tablet Commonly known as:  Velvet Dinh Take 1 Tab by mouth daily. Indications: ANXIETY WITH DEPRESSION  
  
 fluticasone 50 mcg/actuation nasal spray Commonly known as:  Talisheek Finders 2 Sprays by Both Nostrils route daily. gentamicin 0.3 % ophthalmic solution Commonly known as:  GARAMYCIN Administer 1 Drop to left eye every four (4) hours. levocetirizine 5 mg tablet Commonly known as:  Sixto [place] Take 1 Tab by mouth daily. MIRENA 20 mcg/24 hr (5 years) Iud  
Generic drug:  levonorgestrel 1 Each by IntraUTERine route once. nitrofurantoin (macrocrystal-monohydrate) 100 mg capsule Commonly known as:  MACROBID Take 1 Cap by mouth two (2) times a day. phenazopyridine 200 mg tablet Commonly known as:  PYRIDIUM Take 1 Tab by mouth three (3) times daily as needed for Pain. promethazine-codeine 6.25-10 mg/5 mL syrup Commonly known as:  PHENERGAN with CODEINE Take 5 mL by mouth nightly as needed for Cough. Max Daily Amount: 5 mL. Indications: Cough  
  
 valACYclovir 1 gram tablet Commonly known as:  VALTREX  
2 tablets initially then 2 tablets q 12 hours as needed for lesion  Indications: HERPES LABIALIS  
  
 WOMEN'S ONE DAILY PO Take  by mouth daily. Introducing [facility] & HEALTH SERVICES! Ramos Chua introduces [employer] patient portal. Now you can access parts of your medical record, email your doctor's office, and request medication refills online. 1. In your internet browser, go to [email or web address] 2. Click on the First Time User? Click Here link in the Sign In box. You will see the New Member Sign Up page. 3. Enter your [employer] Access Code exactly as it appears below. You will not need to use this code after youve completed the sign-up process. If you do not sign up before the expiration date, you must request a new code. · [employer] Access Code: [email or web address] Expires: 4/24/2018  7:27 AM 
 
4. Enter the last four digits of your Social Security Number (xxxx) and Date of Birth (mm/dd/yyyy) as indicated and click Submit. You will be taken to the next sign-up page. 5. Create a [employer] Advantaget ID. This will be your [employer] login ID and cannot be changed, so think of one that is secure and easy to remember. 6. Create a [employer] password. You can change your password at any time. 7. Enter your Password Reset Question and Answer. This can be used at a later time if you forget your password. 8. Enter your e-mail address.  You will receive e-mail notification when new information is available in 3nder. 9. Click Sign Up. You can now view and download portions of your medical record. 10. Click the Download Summary menu link to download a portable copy of your medical information. If you have questions, please visit the Frequently Asked Questions section of the 3nder website. Remember, 3nder is NOT to be used for urgent needs. For medical emergencies, dial 911. Now available from your iPhone and Android! Please provide this summary of care documentation to your next provider. Your primary care clinician is listed as Ramos Tabares. If you have any questions after today's visit, please call [phone].

## 2018-01-31 ENCOUNTER — CLINICAL SUPPORT (OUTPATIENT)
Dept: FAMILY MEDICINE CLINIC | Age: 38
End: 2018-01-31

## 2018-01-31 DIAGNOSIS — J30.1 NON-SEASONAL ALLERGIC RHINITIS DUE TO POLLEN, UNSPECIFIED CHRONICITY: ICD-10-CM

## 2018-01-31 DIAGNOSIS — Z51.6 DESENSITIZATION TO ALLERGENS: ICD-10-CM

## 2018-01-31 NOTE — MR AVS SNAPSHOT
[address] 
 
 
 [address] [employer] [address] 
[phone] Patient: Tonya Huddleston MRN:  :[id number] Visit Information Date & Time Provider Department Dept. Phone Encounter #  
 2018  3:45 PM [address] [phone] [id number] Your Appointments 2018  3:45 PM  
IMMUNIZATIONS/INJECTIONS with [address] ([address]) Appt Note: Allergy shot  
 [address] [address]  
[address] [facility] [address] [address] Upcoming Health Maintenance Date Due DTaP/Tdap/Td series (1 - Tdap) 1999 PAP AKA CERVICAL CYTOLOGY 10/29/2018 Allergies as of 2018  Review Complete On: 2017 By: Enrrique Polk NP Severity Noted Reaction Type Reactions Azithromycin High 2012   Systemic Swelling  
 z pack Current Immunizations  Reviewed on 10/23/2017 Name Date Influenza Vaccine (Quad) PF 10/20/2017 Not reviewed this visit Vitals OB Status Smoking Status IUD Passive Smoke Exposure - Never Smoker Your Updated Medication List  
  
   
This list is accurate as of: 18  3:40 PM.  Always use your most recent med list.  
  
  
  
  
 Jodi Hernandez NO DRIP(OXYMETAZOLIN) NA  
by Nasal route. azelastine 137 mcg (0.1 %) nasal spray Commonly known as:  ASTELIN  
1 Spray by Both Nostrils route two (2) times a day. Use in each nostril as directed  Indications: SEASONAL ALLERGIC RHINITIS buPROPion  mg SR tablet Commonly known as:  Raydell Perish Take 1 Tab by mouth daily. Indications: ANXIETY WITH DEPRESSION  
  
 fluticasone 50 mcg/actuation nasal spray Commonly known as:  Leartis Jose 2 Sprays by Both Nostrils route daily. gentamicin 0.3 % ophthalmic solution Commonly known as:  GARAMYCIN Administer 1 Drop to left eye every four (4) hours. levocetirizine 5 mg tablet Commonly known as:  Santos Marine Take 1 Tab by mouth daily. MIRENA 20 mcg/24 hr (5 years) Iud  
Generic drug:  levonorgestrel 1 Each by IntraUTERine route once. nitrofurantoin (macrocrystal-monohydrate) 100 mg capsule Commonly known as:  MACROBID Take 1 Cap by mouth two (2) times a day. phenazopyridine 200 mg tablet Commonly known as:  PYRIDIUM Take 1 Tab by mouth three (3) times daily as needed for Pain. promethazine-codeine 6.25-10 mg/5 mL syrup Commonly known as:  PHENERGAN with CODEINE Take 5 mL by mouth nightly as needed for Cough. Max Daily Amount: 5 mL. Indications: Cough  
  
 valACYclovir 1 gram tablet Commonly known as:  VALTREX  
2 tablets initially then 2 tablets q 12 hours as needed for lesion  Indications: HERPES LABIALIS  
  
 WOMEN'S ONE DAILY PO Take  by mouth daily. Introducing [facility] & HEALTH SERVICES! [facility] introduces Flyr patient portal. Now you can access parts of your medical record, email your doctor's office, and request medication refills online. 1. In your internet browser, go to [email or web address] 2. Click on the First Time User? Click Here link in the Sign In box. You will see the New Member Sign Up page. 3. Enter your Flyr Access Code exactly as it appears below. You will not need to use this code after youve completed the sign-up process. If you do not sign up before the expiration date, you must request a new code. · Flyr Access Code: [email or web address] Expires: 4/24/2018  7:27 AM 
 
4. Enter the last four digits of your Social Security Number (xxxx) and Date of Birth (mm/dd/yyyy) as indicated and click Submit. You will be taken to the next sign-up page. 5. Create a Flyr ID.  This will be your Flyr login ID and cannot be changed, so think of one that is secure and easy to remember. 6. Create a Wututu password. You can change your password at any time. 7. Enter your Password Reset Question and Answer. This can be used at a later time if you forget your password. 8. Enter your e-mail address. You will receive e-mail notification when new information is available in [address] 9. Click Sign Up. You can now view and download portions of your medical record. 10. Click the Download Summary menu link to download a portable copy of your medical information. If you have questions, please visit the Frequently Asked Questions section of the Wututu website. Remember, Wututu is NOT to be used for urgent needs. For medical emergencies, dial 911. Now available from your iPhone and Android! Please provide this summary of care documentation to your next provider. Your primary care clinician is listed as Prudy Finley. If you have any questions after today's visit, please call [phone].

## 2018-02-21 ENCOUNTER — CLINICAL SUPPORT (OUTPATIENT)
Dept: FAMILY MEDICINE CLINIC | Age: 38
End: 2018-02-21

## 2018-02-21 DIAGNOSIS — J30.1 NON-SEASONAL ALLERGIC RHINITIS DUE TO POLLEN, UNSPECIFIED CHRONICITY: ICD-10-CM

## 2018-02-21 DIAGNOSIS — Z51.6 DESENSITIZATION TO ALLERGENS: ICD-10-CM

## 2018-02-21 NOTE — MR AVS SNAPSHOT
[address] [facility] [address] 
 
 
 [address] [employer] [address] 
[phone] Patient: Maxine Rader MRN:  :[id number] Visit Information Date & Time Provider Department Dept. Phone Encounter #  
 2018  4:45 PM [facility] [phone] [id number] Your Appointments 2018  4:45 PM  
IMMUNIZATIONS/INJECTIONS with [facility] [facility] Appt Note: Allergy shot  
 [address] [facility] [address]  
[address] [facility] [address] [facility] [address] Upcoming Health Maintenance Date Due DTaP/Tdap/Td series (1 - Tdap) 1999 PAP AKA CERVICAL CYTOLOGY 10/29/2018 Allergies as of 2018  Review Complete On: 2017 By: Yokasta Mclaughlin NP Severity Noted Reaction Type Reactions Azithromycin High 2012   Systemic Swelling  
 z pack Current Immunizations  Reviewed on 10/23/2017 Name Date Influenza Vaccine (Quad) PF 10/20/2017 Not reviewed this visit Vitals OB Status Smoking Status IUD Passive Smoke Exposure - Never Smoker Your Updated Medication List  
  
   
This list is accurate as of 18  4:40 PM.  Always use your most recent med list.  
  
  
  
  
 AFRIN NO DRIP(OXYMETAZOLIN) NA  
by Nasal route. azelastine 137 mcg (0.1 %) nasal spray Commonly known as:  ASTELIN  
1 Spray by Both Nostrils route two (2) times a day. Use in each nostril as directed  Indications: SEASONAL ALLERGIC RHINITIS buPROPion  mg SR tablet Commonly known as:  Bryan Aguillon Take 1 Tab by mouth daily. Indications: ANXIETY WITH DEPRESSION  
  
 fluticasone 50 mcg/actuation nasal spray Commonly known as:  Beckymartha Rizzo 2 Sprays by Both Nostrils route daily. gentamicin 0.3 % ophthalmic solution Commonly known as:  GARAMYCIN Administer 1 Drop to left eye every four (4) hours. levocetirizine 5 mg tablet Commonly known as:  Luann Philip Take 1 Tab by mouth daily. MIRENA 20 mcg/24 hr (5 years) Iud  
Generic drug:  levonorgestrel 1 Each by IntraUTERine route once. nitrofurantoin (macrocrystal-monohydrate) 100 mg capsule Commonly known as:  MACROBID Take 1 Cap by mouth two (2) times a day. phenazopyridine 200 mg tablet Commonly known as:  PYRIDIUM Take 1 Tab by mouth three (3) times daily as needed for Pain. promethazine-codeine 6.25-10 mg/5 mL syrup Commonly known as:  PHENERGAN with CODEINE Take 5 mL by mouth nightly as needed for Cough. Max Daily Amount: 5 mL. Indications: Cough  
  
 valACYclovir 1 gram tablet Commonly known as:  VALTREX  
2 tablets initially then 2 tablets q 12 hours as needed for lesion  Indications: HERPES LABIALIS  
  
 WOMEN'S ONE DAILY PO Take  by mouth daily. Introducing [facility] & HEALTH SERVICES! [facility] introduces OKKAM patient portal. Now you can access parts of your medical record, email your doctor's office, and request medication refills online. 1. In your internet browser, go to [email or web address] 2. Click on the First Time User? Click Here link in the Sign In box. You will see the New Member Sign Up page. 3. Enter your OKKAM Access Code exactly as it appears below. You will not need to use this code after youve completed the sign-up process. If you do not sign up before the expiration date, you must request a new code. · OKKAM Access Code: [email or web address] Expires: 4/24/2018  7:27 AM 
 
4. Enter the last four digits of your Social Security Number (xxxx) and Date of Birth (mm/dd/yyyy) as indicated and click Submit. You will be taken to the next sign-up page. 5. Create a OKKAM ID.  This will be your OKKAM login ID and cannot be changed, so think of one that is secure and easy to remember. 6. Create a Infinio password. You can change your password at any time. 7. Enter your Password Reset Question and Answer. This can be used at a later time if you forget your password. 8. Enter your e-mail address. You will receive e-mail notification when new information is available in [address] 9. Click Sign Up. You can now view and download portions of your medical record. 10. Click the Download Summary menu link to download a portable copy of your medical information. If you have questions, please visit the Frequently Asked Questions section of the Infinio website. Remember, Infinio is NOT to be used for urgent needs. For medical emergencies, dial 911. Now available from your iPhone and Android! Please provide this summary of care documentation to your next provider. Your primary care clinician is listed as Ashli Garcia. If you have any questions after today's visit, please call [phone].

## 2018-03-22 ENCOUNTER — TELEPHONE (OUTPATIENT)
Dept: FAMILY MEDICINE CLINIC | Age: 38
End: 2018-03-22

## 2018-03-22 NOTE — TELEPHONE ENCOUNTER
Called patient advised her that we received message and have forwarded message to Masha Juarez but that Masha Juarez is still with patients at this time and we call her back as soon as we could.

## 2018-03-23 RX ORDER — AMOXICILLIN 500 MG/1
1000 CAPSULE ORAL 3 TIMES DAILY
Qty: 60 CAP | Refills: 0 | Status: SHIPPED | OUTPATIENT
Start: 2018-03-23 | End: 2019-07-08 | Stop reason: SDUPTHER

## 2018-12-20 PROBLEM — F33.9 RECURRENT DEPRESSION (HCC): Status: ACTIVE | Noted: 2018-12-20

## 2019-01-21 PROBLEM — E55.9 VITAMIN D DEFICIENCY: Status: ACTIVE | Noted: 2019-01-21

## 2019-04-06 PROBLEM — L02.91 ABSCESS: Status: ACTIVE | Noted: 2019-04-06

## 2020-05-29 ENCOUNTER — OFFICE VISIT (OUTPATIENT)
Dept: PRIMARY CARE CLINIC | Age: 40
End: 2020-05-29

## 2020-05-29 DIAGNOSIS — U07.1 COVID-19: ICD-10-CM

## 2020-05-29 DIAGNOSIS — J02.9 SORE THROAT: Primary | ICD-10-CM

## 2020-05-29 RX ORDER — AMOXICILLIN 500 MG/1
500 CAPSULE ORAL 3 TIMES DAILY
Qty: 30 CAP | Refills: 0 | Status: SHIPPED | OUTPATIENT
Start: 2020-05-29 | End: 2020-06-08

## 2020-05-31 LAB — SARS-COV-2, NAA: NOT DETECTED

## 2020-06-09 ENCOUNTER — OFFICE VISIT (OUTPATIENT)
Dept: PRIMARY CARE CLINIC | Age: 40
End: 2020-06-09

## 2020-06-09 VITALS — HEART RATE: 98 BPM | TEMPERATURE: 99 F | OXYGEN SATURATION: 97 %

## 2020-06-09 DIAGNOSIS — J02.9 SORE THROAT: Primary | ICD-10-CM

## 2020-06-09 LAB
S PYO AG THROAT QL: POSITIVE
VALID INTERNAL CONTROL?: YES

## 2020-06-09 RX ORDER — AMOXICILLIN AND CLAVULANATE POTASSIUM 875; 125 MG/1; MG/1
1 TABLET, FILM COATED ORAL 2 TIMES DAILY
Qty: 20 TAB | Refills: 0 | Status: SHIPPED | OUTPATIENT
Start: 2020-06-09 | End: 2020-06-19

## 2020-06-09 RX ORDER — AMITRIPTYLINE HYDROCHLORIDE 25 MG/1
TABLET, FILM COATED ORAL
Qty: 60 TAB | Refills: 0 | Status: SHIPPED | OUTPATIENT
Start: 2020-06-09 | End: 2020-07-07

## 2020-06-09 RX ORDER — METHOCARBAMOL 500 MG/1
500 TABLET, FILM COATED ORAL 4 TIMES DAILY
Qty: 30 TAB | Refills: 1 | Status: SHIPPED | OUTPATIENT
Start: 2020-06-09 | End: 2020-09-16 | Stop reason: ALTCHOICE

## 2020-06-09 NOTE — PROGRESS NOTES
Recent Travel Screening and Travel History documentation     Travel Screening      No screening recorded since 20 0000      Travel History   Travel since 20     No documented travel since 20          Subjective:   Sourav Adler was seen today for Ear Pain and Sore Throat          Recent Travel Screening and Travel History documentation     Travel Screening      No screening recorded since 20 0000      Travel History   Travel since 20     No documented travel since 20                ROS - Pertinent items are noted in HPI      Patient Active Problem List   Diagnosis Code    IUGR (intrauterine growth restriction) [id number]    Oligohydramnios antepartum O41.00X0    Depression complicating pregnancy, antepartum O99.340, F32.9    H/O  section Z98.891    Environmental and seasonal allergies J30.89    Insomnia due to stress F51.02    Allergy desensitization therapy Z51.6    Recurrent depression ([facility] [address] F33.9    Vitamin D deficiency E55.9    Abscess L02.91     Home Medications    Medication Sig Start Date End Date Taking? Authorizing Provider   amitriptyline (ELAVIL) 25 mg tablet Take 1 Tab by mouth nightly for 7 days, THEN 2 Tabs nightly for 21 days. 20 Yes Good, Shayy R, DO   methocarbamoL (ROBAXIN) 500 mg tablet Take 1 Tab by mouth four (4) times daily. 20  Yes Good, Shayy R, DO   amoxicillin-clavulanate (AUGMENTIN) 875-125 mg per tablet Take 1 Tab by mouth two (2) times a day for 10 days. 20 Yes Good, Shayy R, DO   amoxicillin (AMOXIL) 500 mg capsule Take 1 Cap by mouth three (3) times daily for 10 days. 20  John Camp MD   methylPREDNISolone (MEDROL DOSEPACK) 4 mg tablet As directed 20   Good, Shayy R, DO   montelukast (SINGULAIR) 10 mg tablet Take 1 Tab by mouth every evening.  19   [place] Lipoma, NP   acetaminophen (TYLENOL EXTRA STRENGTH) 500 mg tablet Take  by mouth every six (6) hours as needed for Pain.    Provider, Historical   azelastine (ASTELIN) 137 mcg (0.1 %) nasal spray 1 [place] by Both Nostrils route two (2) times a day. Use in each nostril as directed  Indications: Seasonal Runny Nose, Vasomotor Rhinitis 5/28/19   Elen RILEY NP   ipratropium (ATROVENT) 0.03 % nasal spray 2 Sprays by Both Nostrils route every twelve (12) hours. 5/14/19   Rich Cotto NP   valACYclovir (VALTREX) 1 gram tablet 2 tablets initially then 2 tablets q 12 hours as needed for lesion  Indications: Cold Sore 4/1/19   Rich Cotto, JAYNA   levonorgestrel (MIRENA) 20 mcg/24 hr (5 years) IUD 1 Each by IntraUTERine route once. Provider, Historical      Allergies   Allergen Reactions    Azithromycin Swelling     z pack          Objective:   Physical Exam  General:  alert, cooperative, no distress   Ears: normal TM's and external ear canals AU   Sinuses: Normal paranasal sinuses without tenderness   Mouth:  Posterior oropharyngeal petechiae/erythema   Neck: supple, symmetrical, trachea midline, no adenopathy and muscle spasms of neck muscles. Heart: normal rate, regular rhythm, normal S1, S2, no murmurs, rubs, clicks or gallops. Lungs: clear to auscultation bilaterally       Visit Vitals  Pulse 98   Temp 99 °F (37.2 °C) (Oral)   SpO2 97%         Assessment/Plan:      Strep - augmentin and symptomatic treatment  Neck muscle spasm - robaxin, stretching, heat  Anxiety Depression  Patient Education:  Reviewed pathophysiology of anxiety and depression and rationale for treatment. Instructed patient to contact office or on-call physician promptly should condition worsen or any new symptoms appear and provided on-call telephone numbers. IF THE PATIENT HAS ANY SUICIDAL OR HOMICIDAL IDEATION, CALL THE OFFICE, DISCUSS WITH A SUPPORT MEMBER OR GO TO THE ER IMMEDIATELY. Patient was agreeable with this plan.     National Suicide Prevention Hotline: 7-181.988.6021     Orders Placed This Encounter    AMB POC RAPID STREP A    amitriptyline (ELAVIL) 25 mg tablet     Sig: Take 1 Tab by mouth nightly for 7 days, THEN 2 Tabs nightly for 21 days. Dispense:  60 Tab     Refill:  0    methocarbamoL (ROBAXIN) 500 mg tablet     Sig: Take 1 Tab by mouth four (4) times daily. Dispense:  30 Tab     Refill:  1    amoxicillin-clavulanate (AUGMENTIN) 875-125 mg per tablet     Sig: Take 1 Tab by mouth two (2) times a day for 10 days. Dispense:  20 Tab     Refill:  0         Tylenol for elevated temp; oral hydration; The patient was advised to return to (or contact) the clinic or go to the ER for any ALARM sx such as temp > 101.5, worsening cough, sputum production, confusion or shortness of breath. Reviewed with patient that COVID-19 pandemic is an evolving situation with rapidly changing recommendations & guidelines. Medical decisions are made based on the the best information available at the time. Recommended patient stay tuned for updates published by trusted sources and to advise your PCP of any unexpected changes in clinical condition. Reviewed with male that COVID-19 pandemic is an evolving situation with rapidly changing recommendations & guidelines. Medical decisions are made based on the the best information available at the time. Recommended male stay tuned for updates published by trusted sources and to advise your PCP of any unexpected changes in clinical condition    Reviewed with her that COVID-19 pandemic is an evolving situation with rapidly changing recommendations & guidelines. Medical decisions are made based on the the best information available at the time. Recommended she stay tuned for updates published by trusted sources and to advise your PCP of any unexpected changes in clinical condition     Disclaimer:  Discussed expected course/resolution/complications of diagnosis in detail with patient. Medication risks/benefits/costs/interactions/alternatives discussed with patient.   She was given an after visit summary which includes diagnoses, current medications, & vitals. She expressed understanding with the diagnosis and plan.         Alden Johnson DO

## 2020-09-04 NOTE — PROGRESS NOTES
S:  Patient has no complaint. Patient is here for allergy injections.     O:  WDWN in NAD    A:  Allergies and is undergoing desensitization therapy  P:  Allergy shot(s) two given per protocol        Observed for 0 minutes        Return to clinic 7 days Well-developed, well nourished

## 2021-07-13 ENCOUNTER — TELEPHONE (OUTPATIENT)
Dept: FAMILY MEDICINE CLINIC | Age: 41
End: 2021-07-13

## 2021-07-13 NOTE — TELEPHONE ENCOUNTER
I haven't seen her since 2019. She will need a visit with a provider. I'm not sure if Dr. Priti Zambrano or Sherman Russo could see her tomorrow or do a VV? I can see her at 11:30 on Thursday if she wants to wait.

## 2021-07-13 NOTE — TELEPHONE ENCOUNTER
This patient called today and stated she is experiencing arthritis pain and a possible pinched nerve. She would like to know if there is anything we can do to help her out. She would like a call back at your earliest convenience.      Thank you

## 2021-12-10 ENCOUNTER — OFFICE VISIT (OUTPATIENT)
Dept: FAMILY MEDICINE CLINIC | Age: 41
End: 2021-12-10
Payer: COMMERCIAL

## 2021-12-10 VITALS
HEIGHT: 65 IN | BODY MASS INDEX: 26.37 KG/M2 | OXYGEN SATURATION: 98 % | RESPIRATION RATE: 20 BRPM | WEIGHT: 158.3 LBS | SYSTOLIC BLOOD PRESSURE: 120 MMHG | DIASTOLIC BLOOD PRESSURE: 80 MMHG | HEART RATE: 105 BPM | TEMPERATURE: 97.8 F

## 2021-12-10 DIAGNOSIS — M25.531 RIGHT WRIST PAIN: Primary | ICD-10-CM

## 2021-12-10 DIAGNOSIS — G56.01 CARPAL TUNNEL SYNDROME OF RIGHT WRIST: ICD-10-CM

## 2021-12-10 DIAGNOSIS — Z23 ENCOUNTER FOR IMMUNIZATION: ICD-10-CM

## 2021-12-10 PROCEDURE — 90686 IIV4 VACC NO PRSV 0.5 ML IM: CPT | Performed by: NURSE PRACTITIONER

## 2021-12-10 PROCEDURE — 99214 OFFICE O/P EST MOD 30 MIN: CPT | Performed by: NURSE PRACTITIONER

## 2021-12-10 PROCEDURE — 90471 IMMUNIZATION ADMIN: CPT | Performed by: NURSE PRACTITIONER

## 2021-12-10 RX ORDER — PREDNISONE 10 MG/1
TABLET ORAL
Qty: 21 TABLET | Refills: 0 | Status: SHIPPED | OUTPATIENT
Start: 2021-12-10 | End: 2022-05-02 | Stop reason: ALTCHOICE

## 2021-12-10 NOTE — PROGRESS NOTES
Chief Complaint   Patient presents with    Arm Pain     R arm pain       HPI:     is a 41 y.o. female in today for an acute visit with a CC of R wrist pain that started after a day at the shooting range in November. The pain is on both the lateral and medial aspects of her R wrist and it radiates up the ulnar aspect of her forearm. She has a brace which she started wearing yesterday that has provided some relief. Pain is worse with radial flexion. Allergies   Allergen Reactions    Azithromycin Swelling     z pack       Current Outpatient Medications   Medication Sig    predniSONE (DELTASONE) 10 mg tablet 2 tabs every day x 7 days then 1 tab PO every day x 7 days    amitriptyline (ELAVIL) 75 mg tablet TAKE 1 TABLET BY MOUTH EVERY NIGHT    acetaminophen (TYLENOL EXTRA STRENGTH) 500 mg tablet Take  by mouth every six (6) hours as needed for Pain.  levonorgestrel (MIRENA) 20 mcg/24 hr (5 years) IUD 1 Each by IntraUTERine route once. No current facility-administered medications for this visit. Past Medical History:   Diagnosis Date    Anemia NEC     IRON DEF ANEMIA    H/O cold sores     HX OTHER MEDICAL     BELLS PALSY 05/2010, CARPEL TUNNEL RIGHT WRIST    Psychiatric problem     DEPRESSION, ANXIETY       Family History   Problem Relation Age of Onset    Diabetes Mother     Hypertension Mother     Diabetes Maternal Grandmother     Hypertension Maternal Grandmother     Diabetes Maternal Grandfather     Hypertension Maternal Grandfather     OSTEOARTHRITIS Father        ROS:  Denies fever, chills, cough, chest pain, SOB,  nausea, vomiting, diarrhea, dysuria. Denies rashes, wounds, + arthralgias, weakness, numbness, visual changes, depression. Denies wt loss, wt gain, hemoptysis, hematochezia or melena. Patient is not experiencing chest pain radiating to the jaw and/or down the arms.     Physical Examination:    /80 (BP 1 Location: Right arm, BP Patient Position: Sitting, BP Cuff Size: Adult)   Pulse (!) 105   Temp 97.8 °F (36.6 °C) (Temporal)   Resp 20   Ht 5' 5\" (1.651 m)   Wt 158 lb 4.8 oz (71.8 kg)   SpO2 98%   BMI 26.34 kg/m²     Wt Readings from Last 3 Encounters:   12/10/21 158 lb 4.8 oz (71.8 kg)   03/06/20 155 lb 12.8 oz (70.7 kg)   02/17/20 159 lb 3.2 oz (72.2 kg)     Constitutional: WDWN Female in no acute distress  HENT:  NC/AT  EYES: EOMI, PERRL  Neck:  Supple, no JVD, mass or bruit. No thyromegaly. Respiratory:  Respirations even and unlabored without use of accessory muscles  Musculoskeletal:  No cyanosis, clubbing or edema of extremities. Moves all extremities without difficulty. R wrist without warmth, erythema, edema. Pain with radial and ulnar deviation of wrist. - Tinel's  Neurologic:  Smooth, even gait without assistance, CN 2-12 grossly intact. Skin: intact and warm to the touch, no rash   Lymphadenopathy: no cervical or supraclavicular nodes  Psych: Pleasant and appropriate. Judgment normal. Alert and oriented x 3. ASSESSMENT AND PLAN:       ICD-10-CM ICD-9-CM    1. Right wrist pain  M25.531 719.43 predniSONE (DELTASONE) 10 mg tablet   2. Encounter for immunization  Z23 V03.89 MA IMMUNIZ ADMIN,1 SINGLE/COMB VAC/TOXOID      INFLUENZA VIRUS VAC QUAD,SPLIT,PRESV FREE SYRINGE IM   3. Carpal tunnel syndrome of right wrist  G56.01 354.0      Rx prednisone. Recommended bracing R wrist for 2 weeks, especially while at work if possible. Flu vaccine today. Ortho referral vs joint injection if no relief. Patient aware of plan of care and verbalized understanding. Questions answered. RTC 2 weeks if needed.      Ellis Joshi NP

## 2021-12-10 NOTE — PATIENT INSTRUCTIONS
Carpal Tunnel Syndrome: Care Instructions  Overview     Carpal tunnel syndrome is numbness, tingling, weakness, and pain in your hand, wrist, and sometimes forearm. It is caused by pressure on the median nerve. This nerve and several tough tissues called tendons run through a space in the wrist. This space is called the carpal tunnel. The repeated hand motions used in work and some hobbies and sports can put pressure on the median nerve. Pregnancy can cause carpal tunnel syndrome. Several conditions, such as diabetes, arthritis, and an underactive thyroid, can also cause it. You may be able to limit an activity or change the way you do it to reduce your symptoms. You also can take other steps to feel better. If your symptoms are mild, 1 to 2 weeks of home treatment are likely to ease your pain. Surgery is needed only if other treatments do not work. Follow-up care is a key part of your treatment and safety. Be sure to make and go to all appointments, and call your doctor if you are having problems. It's also a good idea to know your test results and keep a list of the medicines you take. How can you care for yourself at home? · If possible, stop or reduce the activity that causes your symptoms. If you cannot stop the activity, take frequent breaks to rest and stretch or change hand positions to do a task. Try switching hands, such as when using a computer mouse. · Try to avoid bending or twisting your wrists. · Ask your doctor if you can take an over-the-counter pain medicine, such as acetaminophen (Tylenol), ibuprofen (Advil, Motrin), or naproxen (Aleve). Be safe with medicines. Read and follow all instructions on the label. · If your doctor prescribes corticosteroid medicine to help reduce pain and swelling, take it exactly as prescribed. Call your doctor if you think you are having a problem with your medicine. · Put ice or a cold pack on your wrist for 10 to 20 minutes at a time to ease pain.  Put a thin cloth between the ice and your skin. · If your doctor or your physical or occupational therapist tells you to wear a wrist splint, wear it as directed to keep your wrist in a neutral position. This also eases pressure on your median nerve. · Ask your doctor whether you should have physical or occupational therapy to learn how to do tasks differently. · Try a yoga class to stretch your muscles and build strength in your hands and wrists. Yoga has been shown to ease carpal tunnel symptoms. To prevent carpal tunnel  · When working at a computer, keep your hands and wrists in line with your forearms. Hold your elbows close to your sides. Take a break every 10 to 15 minutes. · Try these exercises:  ? Warm up: Rotate your wrist up, down, and from side to side. Repeat this 4 times. Stretch your fingers far apart, relax them, then stretch them again. Repeat 4 times. Stretch your thumb by pulling it back gently, holding it, and then releasing it. Repeat 4 times. ? Prayer stretch: Start with your palms together in front of your chest just below your chin. Slowly lower your hands toward your waistline while keeping your hands close to your stomach and your palms together until you feel a mild to moderate stretch under your forearms. Hold for 10 to 20 seconds. Repeat 4 times. ? Wrist flexor stretch: Hold your arm in front of you with your palm up. Bend your wrist, pointing your hand toward the floor. With your other hand, gently bend your wrist further until you feel a mild to moderate stretch in your forearm. Hold for 10 to 20 seconds. Repeat 4 times. ? Wrist extensor stretch: Repeat the steps for the wrist flexor stretch, but begin with your extended hand palm down. · Squeeze a rubber exercise ball several times a day to keep your hands and fingers strong. · Avoid holding objects (such as a book) in one position for a long time. When possible, use your whole hand to grasp an object.  Using just the thumb and index finger can put stress on the wrist.  · Do not smoke. It can make this condition worse by reducing blood flow to the median nerve. If you need help quitting, talk to your doctor about stop-smoking programs and medicines. These can increase your chances of quitting for good. When should you call for help? Watch closely for changes in your health, and be sure to contact your doctor if:    · Your pain or other problems do not get better with home care.     · You want more information about physical or occupational therapy.     · You have side effects of your corticosteroid medicine, such as:  ? Weight gain. ? Mood changes. ? Trouble sleeping. ? Bruising easily.     · You have any other problems with your medicine. Where can you learn more? Go to http://www.gray.com/  Enter R432 in the search box to learn more about \"Carpal Tunnel Syndrome: Care Instructions. \"  Current as of: July 1, 2021               Content Version: 13.0  © 2006-2021 [employer]. Care instructions adapted under license by [employer] (which disclaims liability or warranty for this information). If you have questions about a medical condition or this instruction, always ask your healthcare professional. [place] [address] any warranty or liability for your use of this information. Carpal Tunnel Syndrome: Exercises  Introduction  Here are some examples of exercises for you to try. The exercises may be suggested for a condition or for rehabilitation. Start each exercise slowly. Ease off the exercises if you start to have pain. You will be told when to start these exercises and which ones will work best for you. Warm-up stretches  When you no longer have pain or numbness, you can do exercises to help prevent carpal tunnel syndrome from coming back. Do not do any stretch or movement that is uncomfortable or painful.   1. Rotate your wrist up, down, and from side to side. Repeat 4 times. 2. Stretch your fingers far apart. Relax them, and then stretch them again. Repeat 4 times. 3. Stretch your thumb by pulling it back gently, holding it, and then releasing it. Repeat 4 times. How to do the exercises  Prayer stretch    1. Start with your palms together in front of your chest just below your chin. 2. Slowly lower your hands toward your waistline, keeping your hands close to your stomach and your palms together until you feel a mild to moderate stretch under your forearms. 3. Hold for at least 15 to 30 seconds. Repeat 2 to 4 times. Wrist flexor stretch    1. Extend your arm in front of you with your palm up. 2. Bend your wrist, pointing your hand toward the floor. 3. With your other hand, gently bend your wrist farther until you feel a mild to moderate stretch in your forearm. 4. Hold for at least 15 to 30 seconds. Repeat 2 to 4 times. Wrist extensor stretch    1. Repeat steps 1 through 4 of the stretch above, but begin with your extended hand palm down. Follow-up care is a key part of your treatment and safety. Be sure to make and go to all appointments, and call your doctor if you are having problems. It's also a good idea to know your test results and keep a list of the medicines you take. Where can you learn more? Go to http://www.gray.com/  Enter J195 in the search box to learn more about \"Carpal Tunnel Syndrome: Exercises. \"  Current as of: July 1, 2021               Content Version: 13.0  © 2006-2021 [employer]. Care instructions adapted under license by [employer] (which disclaims liability or warranty for this information). If you have questions about a medical condition or this instruction, always ask your healthcare professional. [place] [address] any warranty or liability for your use of this information.

## 2022-02-15 ENCOUNTER — VIRTUAL VISIT (OUTPATIENT)
Dept: FAMILY MEDICINE CLINIC | Age: 42
End: 2022-02-15
Payer: COMMERCIAL

## 2022-02-15 DIAGNOSIS — F33.9 RECURRENT DEPRESSION (HCC): ICD-10-CM

## 2022-02-15 DIAGNOSIS — G89.29 CHRONIC LEFT EAR PAIN: ICD-10-CM

## 2022-02-15 DIAGNOSIS — R09.81 NASAL CONGESTION: Primary | ICD-10-CM

## 2022-02-15 DIAGNOSIS — H92.02 CHRONIC LEFT EAR PAIN: ICD-10-CM

## 2022-02-15 DIAGNOSIS — J30.89 ENVIRONMENTAL AND SEASONAL ALLERGIES: ICD-10-CM

## 2022-02-15 DIAGNOSIS — R09.81 CHRONIC NASAL CONGESTION: ICD-10-CM

## 2022-02-15 PROCEDURE — 99443 PR PHYS/QHP TELEPHONE EVALUATION 21-30 MIN: CPT

## 2022-02-15 RX ORDER — BUPROPION HYDROCHLORIDE 150 MG/1
150 TABLET ORAL DAILY
Qty: 30 TABLET | Refills: 0 | Status: SHIPPED | OUTPATIENT
Start: 2022-02-15 | End: 2022-03-16

## 2022-02-15 RX ORDER — MONTELUKAST SODIUM 10 MG/1
10 TABLET ORAL DAILY
Qty: 30 TABLET | Refills: 1 | Status: SHIPPED | OUTPATIENT
Start: 2022-02-15 | End: 2022-05-17

## 2022-02-15 RX ORDER — FLUTICASONE PROPIONATE 50 MCG
SPRAY, SUSPENSION (ML) NASAL
Qty: 1 EACH | Refills: 3 | Status: SHIPPED | OUTPATIENT
Start: 2022-02-15 | End: 2022-07-18

## 2022-02-15 NOTE — PROGRESS NOTES
Clary Vargas is a 35 y.o. female presenting for/with:    Chief Complaint   Patient presents with    Ear Pain     left ear pain x few days, taking tylenol    Pressure Behind the Eyes     she said this is a normal thing for her       There were no vitals taken for this visit. Pain Scale: /10  Pain Location:     1. Have you been to the ER, urgent care clinic since your last visit? Hospitalized since your last visit? NO    2. Have you seen or consulted any other health care providers outside of the [address] [place] [address] since your last visit? Include any pap smears or colon screening. NO    Symptom review:  NO  Fever   NO  Shaking chills  NO  Cough  NO  Body aches  NO  Coughing up blood  NO  Chest congestion  NO  Chest pain  NO  Shortness of breath  NO  Profound Loss of smell/taste  NO  Nausea/Vomiting   NO  Loose stool/Diarrhea  NO  any skin issues    Patient Risk Factors Reviewed as follows:  NO  have you been in Close contact with confirmed COVID19 patient   NO  History of recent travel to affected geographical areas within the past 14 days  NO  COPD  NO  Active Cancer/Leukemia/Lymphoma/Chemotherapy  NO  Oral steroid use  NO  Pregnant  NO  Diabetes Mellitus  NO  Heart disease  NO  Asthma  NO Health care worker at home  [address] care worker  NO Is there a Pregnant Woman in the home  NO Dialysis pt in the home   NO a large number of people living in the home    Learning Assessment 12/10/2021   PRIMARY LEARNER Patient   HIGHEST LEVEL OF EDUCATION - PRIMARY LEARNER  GRADUATED HIGH SCHOOL OR GED   BARRIERS PRIMARY LEARNER NONE   CO-LEARNER CAREGIVER No   PRIMARY LANGUAGE ENGLISH   LEARNER PREFERENCE PRIMARY READING     -   ANSWERED BY Patient   RELATIONSHIP SELF     Fall Risk Assessment, last 12 mths 2/15/2022   Able to walk? Yes   Fall in past 12 months? 0   Do you feel unsteady?  0   Are you worried about falling 0       3 most recent PHQ Screens 2/15/2022   Little interest or pleasure in doing things More than half the days   Feeling down, depressed, irritable, or hopeless More than half the days   Total Score PHQ 2 4   Trouble falling or staying asleep, or sleeping too much -   Feeling tired or having little energy -   Poor appetite, weight loss, or overeating -   Feeling bad about yourself - or that you are a failure or have let yourself or your family down -   Trouble concentrating on things such as school, work, reading, or watching TV -   Moving or speaking so slowly that other people could have noticed; or the opposite being so fidgety that others notice -   Thoughts of being better off dead, or hurting yourself in some way -   PHQ 9 Score -     Abuse Screening Questionnaire 2/15/2022   Do you ever feel afraid of your partner? N   Are you in a relationship with someone who physically or mentally threatens you? N   Is it safe for you to go home? Y       ADL Assessment 2/15/2022   Feeding yourself No Help Needed   Getting from bed to chair No Help Needed   Getting dressed No Help Needed   Bathing or showering No Help Needed   Walk across the room (includes cane/walker) No Help Needed   Using the telphone No Help Needed   Taking your medications No Help Needed   Preparing meals No Help Needed   Managing money (expenses/bills) No Help Needed   Moderately strenuous housework (laundry) No Help Needed   Shopping for personal items (toiletries/medicines) No Help Needed   Shopping for groceries No Help Needed   Driving No Help Needed   Climbing a flight of stairs No Help Needed   Getting to places beyond walking distances No Help Needed      No advanced directives on file. Verified emergency contact.

## 2022-02-15 NOTE — LETTER
NOTIFICATION RETURN TO WORK / SCHOOL    2/15/2022 11:35 AM    Ms. [address] [facility] [address] [phone]      To Whom It May Concern:    Sadia Adam is currently under the care of Emilie Byrnes. She will return to work/school on: Monday, February 21, 2022    If there are questions or concerns please have the patient contact our office.         Sincerely,      Husam Bell NP

## 2022-02-15 NOTE — PATIENT INSTRUCTIONS
Managing Your Allergies: Care Instructions  Your Care Instructions     Managing your allergies is an important part of staying healthy. Your doctor will help you find out what may be the cause of the allergies. Common causes of symptoms are house dust and dust mites, animal dander, mold, and pollen. As soon as you know what triggers your symptoms, try to avoid those things. This can help prevent allergy symptoms, asthma, and other health problems. Ask your doctor about allergy medicine or immunotherapy. These treatments may help reduce or prevent allergy symptoms. Follow-up care is a key part of your treatment and safety. Be sure to make and go to all appointments, and call your doctor if you are having problems. It's also a good idea to know your test results and keep a list of the medicines you take. How can you care for yourself at home? · If you have been told by your doctor that dust or dust mites are causing your allergy, decrease the dust around your bed:  ? Wash sheets, pillowcases, and other bedding in hot water every week. ? Use dust-proof covers for pillows, duvets, and mattresses. Avoid plastic covers because they tear easily and do not \"breathe. \" Wash as instructed on the label. ? Do not use any blankets and pillows that you do not need. ? Use blankets that you can wash in your washing machine. ? Consider removing drapes and carpets, which attract and hold dust, from your bedroom. · If you are allergic to house dust and mites, do not use home humidifiers. Your doctor can suggest ways you can control dust and mites. · Look for signs of cockroaches. Cockroaches cause allergic reactions. Use cockroach baits to get rid of them. Then, clean your home well. Cockroaches like areas where grocery bags, newspapers, empty bottles, or cardboard boxes are stored. Do not keep these inside your home, and keep trash and food containers sealed.  Seal off any spots where cockroaches might enter your home.  · If you are allergic to mold, get rid of furniture, rugs, and drapes that smell musty. Check for mold in the bathroom. · If you are allergic to outdoor pollen or mold spores, use air-conditioning. Change or clean all filters every month. Keep windows closed. · If you are allergic to pollen, stay inside when pollen counts are high. Use a vacuum  with a HEPA filter or a double-thickness filter at least two times each week. · Stay inside when air pollution is bad. Avoid paint fumes, perfumes, and other strong odors. · Avoid conditions that make your allergies worse. Stay away from smoke. Do not smoke or let anyone else smoke in your house. Do not use fireplaces or wood-burning stoves. · If you are allergic to your pets, change the air filter in your furnace every month. Use high-efficiency filters. · If you are allergic to pet dander, keep pets outside or out of your bedroom. Old carpet and cloth furniture can hold a lot of animal dander. You may need to replace them. When should you call for help? Give an epinephrine shot if:    · You think you are having a severe allergic reaction. After giving an epinephrine shot call 911, even if you feel better. Call 911 if:    · You have symptoms of a severe allergic reaction. These may include:  ? Sudden raised, red areas (hives) all over your body. ? Swelling of the throat, mouth, lips, or tongue. ? Trouble breathing. ? Passing out (losing consciousness). Or you may feel very lightheaded or suddenly feel weak, confused, or restless.     · You have been given an epinephrine shot, even if you feel better. Call your doctor now or seek immediate medical care if:    · You have symptoms of an allergic reaction, such as:  ? A rash or hives (raised, red areas on the skin). ? Itching. ? Swelling. ? Belly pain, nausea, or vomiting.    Watch closely for changes in your health, and be sure to contact your doctor if:    · Your allergies get worse.     · You need help controlling your allergies.     · You have questions about allergy testing.     · You do not get better as expected. Where can you learn more? Go to http://www.gray.com/  Enter L249 in the search box to learn more about \"Managing Your Allergies: Care Instructions. \"  Current as of: February 10, 2021               Content Version: 13.0  © 1159-7890 [employer]. Care instructions adapted under license by [employer] (which disclaims liability or warranty for this information). If you have questions about a medical condition or this instruction, always ask your healthcare professional. [place] [address] any warranty or liability for your use of this information.

## 2022-02-15 NOTE — PROGRESS NOTES
Nohemy Hall (: 1980) is a 35 y.o. female, established patient, here for evaluation of the following chief complaint(s):   Ear Pain (left ear pain x few days, taking tylenol) and Pressure Behind the Eyes (she said this is a normal thing for her)       ASSESSMENT/PLAN:  Below is the assessment and plan developed based on review of pertinent history, labs, studies, and medications. 1. Nasal congestion  -     fluticasone propionate (FLONASE) 50 mcg/actuation nasal spray; Two sprays to each nostril once daily, Normal, Disp-1 Each, R-3  -     montelukast (SINGULAIR) 10 mg tablet; Take 1 Tablet by mouth daily. , Normal, Disp-30 Tablet, R-1  2. Chronic left ear pain  -     fluticasone propionate (FLONASE) 50 mcg/actuation nasal spray; Two sprays to each nostril once daily, Normal, Disp-1 Each, R-3  -     montelukast (SINGULAIR) 10 mg tablet; Take 1 Tablet by mouth daily. , Normal, Disp-30 Tablet, R-1  -     REFERRAL TO ENT-OTOLARYNGOLOGY  3. Recurrent depression (HCC)  -     buPROPion XL (WELLBUTRIN XL) 150 mg tablet; Take 1 Tablet by mouth daily. , Normal, Disp-30 Tablet, R-0  4. Environmental and seasonal allergies  -     REFERRAL TO ENT-OTOLARYNGOLOGY  5. Chronic nasal congestion  -     fluticasone propionate (FLONASE) 50 mcg/actuation nasal spray; Two sprays to each nostril once daily, Normal, Disp-1 Each, R-3  -     montelukast (SINGULAIR) 10 mg tablet; Take 1 Tablet by mouth daily. , Normal, Disp-30 Tablet, R-1  -     REFERRAL TO ENT-OTOLARYNGOLOGY    Recommend she switch antihistamine from Claritin to Zyrtec or Allegra; add Singulair and Flonase. Refer back to ENT for management of chronic symptoms. Stop Elavil; she is concerned about medication-related weight gain so we will try Wellbutrin for her depressive symptoms. Patient educated as below: Will titrate gradually depending on the response. · Patient will be seen or communicate within a few weeks their progress.    · Risks, benefits, and side effects of medications were reviewed and discussed in detail including the black box warning about the potential for increased suicide risk among adolescents treated with these medications. · Patient agreed to alert others and to seek help promptly if they would experience any intensification of their previous passive thoughts of life not being worth living. · Patient agreed that the potential benefit from a medication trial outweighs the acknowledged risks. · Patient advised that if feeling that they feel they would hurt themselves, they should call someone or go to the ED/Call 911 IMMEDIATELY. · Suicide hotline number 8-620-222-COPE     Discussed expected benefits vs possible side effects of SSRIs. Explained maximal effect may not be noted for 6 weeks. Discussed possibility of increased suicidal tendencies during onset of action. Patient contracts for safety and can identify an accessible social support network. Patient underdstands that medication is more effective when partnered with counseling. Follow up appointment scheduled for 4 weeks. Return in about 4 weeks (around 3/15/2022). SUBJECTIVE/OBJECTIVE:  Hawa Bustos presents for chronic left ear pain worse for the past 1 week; she also notes worsening of her chronic nasal congestion for the past 1 week. She has a long hx of sinus issues and had endoscopic sinus surgery with Dr. Yumiko Danielle several years ago; she has also tried allergy desensitization without success. She has been using Claritin-D, nasal lavage, and APAP for her pain over the last week without much relief. She describes her ear pain as severe, with pressure and crackling/popping sensations; she does not have ringing or hear loss in the ear, dizziness, headache, or fever. She is also concerned about worsening depressive symptoms; she has been not taking her amitriptyline consistently because it makes her too drowsy.   She has had more feelings of sadness and anxiety for the last few months; this has been exacerbated by increased demands at work, loss of several family members, and personal and familial sickness. She denies SI/HI, hallucinations, delusion, or paranoia. Ear Pain  Pertinent negatives include no headaches and no shortness of breath. Pressure Behind the Eyes   Associated symptoms include congestion and ear pain. Pertinent negatives include no chills, no sore throat, no cough, no shortness of breath and no headaches. Review of Systems   Constitutional: Positive for fatigue. Negative for chills and fever. HENT: Positive for congestion, ear pain and postnasal drip. Negative for hearing loss, nosebleeds, sore throat, tinnitus and trouble swallowing. Eyes: Negative. Respiratory: Negative. Negative for cough, shortness of breath and wheezing. Neurological: Negative for dizziness and headaches. Psychiatric/Behavioral: Negative for hallucinations, self-injury and suicidal ideas. The patient is nervous/anxious. On this date 02/15/2022 I have spent 45 minutes reviewing previous notes, test results and face to face (virtual) with the patient discussing the diagnosis and importance of compliance with the treatment plan as well as documenting on the day of the visit. Maryellen Watts, was evaluated through a synchronous (real-time) audio encounter. The patient (or guardian if applicable) is aware that this is a billable service, which includes applicable co-pays. Verbal consent to proceed has been obtained. The visit was conducted pursuant to the emergency declaration under the [facility] [address] [facility], [address] [place] [address] and the [employer] and [employer] Act. Patient identification was verified, and a caregiver was present when appropriate. The patient was located at home in a state where the provider was licensed to provide care.        An electronic signature was used to authenticate this note.   -- Leni Sepulveda, NP

## 2022-02-23 ENCOUNTER — OFFICE VISIT (OUTPATIENT)
Dept: FAMILY MEDICINE CLINIC | Age: 42
End: 2022-02-23
Payer: COMMERCIAL

## 2022-02-23 VITALS
DIASTOLIC BLOOD PRESSURE: 68 MMHG | SYSTOLIC BLOOD PRESSURE: 140 MMHG | HEART RATE: 100 BPM | OXYGEN SATURATION: 99 % | HEIGHT: 65 IN | WEIGHT: 184.2 LBS | TEMPERATURE: 97.3 F | BODY MASS INDEX: 30.69 KG/M2 | RESPIRATION RATE: 20 BRPM

## 2022-02-23 DIAGNOSIS — J30.89 ENVIRONMENTAL AND SEASONAL ALLERGIES: Primary | ICD-10-CM

## 2022-02-23 DIAGNOSIS — R09.81 CHRONIC NASAL CONGESTION: ICD-10-CM

## 2022-02-23 DIAGNOSIS — H92.02 CHRONIC LEFT EAR PAIN: ICD-10-CM

## 2022-02-23 DIAGNOSIS — G89.29 CHRONIC LEFT EAR PAIN: ICD-10-CM

## 2022-02-23 DIAGNOSIS — R05.9 COUGH: ICD-10-CM

## 2022-02-23 PROCEDURE — 99214 OFFICE O/P EST MOD 30 MIN: CPT

## 2022-02-23 RX ORDER — IPRATROPIUM BROMIDE 42 UG/1
2 SPRAY, METERED NASAL 4 TIMES DAILY
Qty: 15 ML | Refills: 1 | Status: SHIPPED | OUTPATIENT
Start: 2022-02-23

## 2022-02-23 RX ORDER — PREDNISONE 20 MG/1
40 TABLET ORAL DAILY
Qty: 10 TABLET | Refills: 0 | Status: SHIPPED | OUTPATIENT
Start: 2022-02-23 | End: 2022-02-28

## 2022-02-23 RX ORDER — PROMETHAZINE HYDROCHLORIDE AND DEXTROMETHORPHAN HYDROBROMIDE 6.25; 15 MG/5ML; MG/5ML
5 SYRUP ORAL
Qty: 180 ML | Refills: 0 | Status: SHIPPED | OUTPATIENT
Start: 2022-02-23 | End: 2022-02-25

## 2022-02-23 NOTE — PROGRESS NOTES
Chief Complaint   Patient presents with    Ear Pain     treated     Cough     since last tuesday morning    Hoarse     itchy throat       HPI:     is a 35 y.o. female who presents for an acute visit. She notes that her voice became hoarse and she developed a cough about 3 days ago; she inquires about an antibiotic \"to clear it up. \"  She has not had any other symptoms, including fever, chills, SOB, CP. She was seen virtually on 2/15 for chronic left ear pain and nasal congestion; at that time, she was given Flonase, Singulair, antihistamine, and instructed to f/u with her ENT. She has been using the medications as directed, but has not contacted her ENT because she believes that she needs to see a dentist instead. Allergies   Allergen Reactions    Azithromycin Swelling     z pack       Current Outpatient Medications   Medication Sig    ipratropium (ATROVENT) 42 mcg (0.06 %) nasal spray 2 Sprays by Both Nostrils route four (4) times daily.  predniSONE (DELTASONE) 20 mg tablet Take 40 mg by mouth daily for 5 days.  promethazine-dextromethorphan (PROMETHAZINE-DM) 6.25-15 mg/5 mL syrup Take 5 mL by mouth every four (4) hours as needed for Cough for up to 7 days. Indications: cough    fluticasone propionate (FLONASE) 50 mcg/actuation nasal spray Two sprays to each nostril once daily    montelukast (SINGULAIR) 10 mg tablet Take 1 Tablet by mouth daily.  buPROPion XL (WELLBUTRIN XL) 150 mg tablet Take 1 Tablet by mouth daily.  predniSONE (DELTASONE) 10 mg tablet 2 tabs every day x 7 days then 1 tab PO every day x 7 days    acetaminophen (TYLENOL EXTRA STRENGTH) 500 mg tablet Take  by mouth every six (6) hours as needed for Pain.  levonorgestrel (MIRENA) 20 mcg/24 hr (5 years) IUD 1 Each by IntraUTERine route once. No current facility-administered medications for this visit.        Past Medical History:   Diagnosis Date    Anemia NEC     IRON DEF ANEMIA    Depression complicating pregnancy, antepartum 9/12/2012    H/O cold sores     HX OTHER MEDICAL     BELLS PALSY 05/2010, CARPEL TUNNEL RIGHT WRIST    IUGR (intrauterine growth restriction) 9/12/2012    Oligohydramnios antepartum 9/12/2012    Psychiatric problem     DEPRESSION, ANXIETY       Family History   Problem Relation Age of Onset    Diabetes Mother     Hypertension Mother     Diabetes Maternal Grandmother     Hypertension Maternal Grandmother     Diabetes Maternal Grandfather     Hypertension Maternal Grandfather     OSTEOARTHRITIS Father      Review of Systems   Constitutional: Negative for chills, fever and malaise/fatigue. HENT: Positive for congestion and ear pain. Eyes: Negative. Respiratory: Positive for cough. Skin: Negative. Neurological: Negative. Negative for dizziness and headaches. Endo/Heme/Allergies: Positive for environmental allergies.         BP (!) 140/68   Pulse 100   Temp 97.3 °F (36.3 °C) (Temporal)   Resp 20   Ht 5' 5\" (1.651 m)   Wt 184 lb 3.2 oz (83.6 kg)   SpO2 99%   BMI 30.65 kg/m²     Wt Readings from Last 3 Encounters:   02/23/22 184 lb 3.2 oz (83.6 kg)   12/10/21 158 lb 4.8 oz (71.8 kg)   03/06/20 155 lb 12.8 oz (70.7 kg)       3 most recent PHQ Screens 2/23/2022   Little interest or pleasure in doing things Not at all   Feeling down, depressed, irritable, or hopeless Not at all   Total Score PHQ 2 0   Trouble falling or staying asleep, or sleeping too much -   Feeling tired or having little energy -   Poor appetite, weight loss, or overeating -   Feeling bad about yourself - or that you are a failure or have let yourself or your family down -   Trouble concentrating on things such as school, work, reading, or watching TV -   Moving or speaking so slowly that other people could have noticed; or the opposite being so fidgety that others notice -   Thoughts of being better off dead, or hurting yourself in some way -   PHQ 9 Score -         Physical Exam  Constitutional:       General: She is not in acute distress. Appearance: Normal appearance. HENT:      Head: Normocephalic and atraumatic. Right Ear: Tympanic membrane, ear canal and external ear normal.      Left Ear: Tympanic membrane, ear canal and external ear normal.      Nose: Nose normal.      Mouth/Throat:      Mouth: Mucous membranes are moist.      Dentition: Dental caries present. Pharynx: Oropharynx is clear. Uvula midline. No oropharyngeal exudate or posterior oropharyngeal erythema. Tonsils: No tonsillar exudate or tonsillar abscesses. 3+ on the right. 3+ on the left. Eyes:      Extraocular Movements: Extraocular movements intact. Conjunctiva/sclera: Conjunctivae normal.      Pupils: Pupils are equal, round, and reactive to light. Cardiovascular:      Rate and Rhythm: Normal rate. Pulses: Normal pulses. Pulmonary:      Effort: Pulmonary effort is normal.   Musculoskeletal:      Cervical back: Normal range of motion and neck supple. Skin:     General: Skin is warm and dry. Neurological:      General: No focal deficit present. Mental Status: She is alert and oriented to person, place, and time. Psychiatric:         Mood and Affect: Mood normal.         Behavior: Behavior normal.         Thought Content: Thought content normal.         Judgment: Judgment normal.     ASSESSMENT AND PLAN:       ICD-10-CM ICD-9-CM    1. Environmental and seasonal allergies  J30.89 477.8 ipratropium (ATROVENT) 42 mcg (0.06 %) nasal spray   2. Chronic left ear pain  H92.02 388.70 ipratropium (ATROVENT) 42 mcg (0.06 %) nasal spray    G89.29 338.29 predniSONE (DELTASONE) 20 mg tablet   3. Chronic nasal congestion  R09.81 478.19 ipratropium (ATROVENT) 42 mcg (0.06 %) nasal spray      predniSONE (DELTASONE) 20 mg tablet      promethazine-dextromethorphan (PROMETHAZINE-DM) 6.25-15 mg/5 mL syrup   4.  Cough  R05.9 786.2 promethazine-dextromethorphan (PROMETHAZINE-DM) 6.25-15 mg/5 mL syrup       Continue previous treatment and add short course of oral steroid, Atrovent nasal spray, and antitussive for bedtime. Recommend she f/u with ENT to discover source of her chronic nasal congestion and ear pain. Medication Side Effects and Warnings were discussed with patient:  yes  Patient Labs were reviewed:  yes  Patient Past Records were reviewed:  yes      Patient aware of plan of care and verbalized understanding. Questions answered. RTC PRN or sooner if needed. On this date 02/23/2022 I have spent 20 minutes reviewing previous notes, test results and face to face with the patient discussing the diagnosis and importance of compliance with the treatment plan as well as documenting on the day of the visit.     Husam Das NP

## 2022-02-23 NOTE — PATIENT INSTRUCTIONS
Managing Your Allergies: Care Instructions  Your Care Instructions     Managing your allergies is an important part of staying healthy. Your doctor will help you find out what may be the cause of the allergies. Common causes of symptoms are house dust and dust mites, animal dander, mold, and pollen. As soon as you know what triggers your symptoms, try to avoid those things. This can help prevent allergy symptoms, asthma, and other health problems. Ask your doctor about allergy medicine or immunotherapy. These treatments may help reduce or prevent allergy symptoms. Follow-up care is a key part of your treatment and safety. Be sure to make and go to all appointments, and call your doctor if you are having problems. It's also a good idea to know your test results and keep a list of the medicines you take. How can you care for yourself at home? · If you have been told by your doctor that dust or dust mites are causing your allergy, decrease the dust around your bed:  ? Wash sheets, pillowcases, and other bedding in hot water every week. ? Use dust-proof covers for pillows, duvets, and mattresses. Avoid plastic covers because they tear easily and do not \"breathe. \" Wash as instructed on the label. ? Do not use any blankets and pillows that you do not need. ? Use blankets that you can wash in your washing machine. ? Consider removing drapes and carpets, which attract and hold dust, from your bedroom. · If you are allergic to house dust and mites, do not use home humidifiers. Your doctor can suggest ways you can control dust and mites. · Look for signs of cockroaches. Cockroaches cause allergic reactions. Use cockroach baits to get rid of them. Then, clean your home well. Cockroaches like areas where grocery bags, newspapers, empty bottles, or cardboard boxes are stored. Do not keep these inside your home, and keep trash and food containers sealed.  Seal off any spots where cockroaches might enter your home.  · If you are allergic to mold, get rid of furniture, rugs, and drapes that smell musty. Check for mold in the bathroom. · If you are allergic to outdoor pollen or mold spores, use air-conditioning. Change or clean all filters every month. Keep windows closed. · If you are allergic to pollen, stay inside when pollen counts are high. Use a vacuum  with a HEPA filter or a double-thickness filter at least two times each week. · Stay inside when air pollution is bad. Avoid paint fumes, perfumes, and other strong odors. · Avoid conditions that make your allergies worse. Stay away from smoke. Do not smoke or let anyone else smoke in your house. Do not use fireplaces or wood-burning stoves. · If you are allergic to your pets, change the air filter in your furnace every month. Use high-efficiency filters. · If you are allergic to pet dander, keep pets outside or out of your bedroom. Old carpet and cloth furniture can hold a lot of animal dander. You may need to replace them. When should you call for help? Give an epinephrine shot if:    · You think you are having a severe allergic reaction. After giving an epinephrine shot call 911, even if you feel better. Call 911 if:    · You have symptoms of a severe allergic reaction. These may include:  ? Sudden raised, red areas (hives) all over your body. ? Swelling of the throat, mouth, lips, or tongue. ? Trouble breathing. ? Passing out (losing consciousness). Or you may feel very lightheaded or suddenly feel weak, confused, or restless.     · You have been given an epinephrine shot, even if you feel better. Call your doctor now or seek immediate medical care if:    · You have symptoms of an allergic reaction, such as:  ? A rash or hives (raised, red areas on the skin). ? Itching. ? Swelling. ? Belly pain, nausea, or vomiting.    Watch closely for changes in your health, and be sure to contact your doctor if:    · Your allergies get worse.     · You need help controlling your allergies.     · You have questions about allergy testing.     · You do not get better as expected. Where can you learn more? Go to http://www.gray.com/  Enter L249 in the search box to learn more about \"Managing Your Allergies: Care Instructions. \"  Current as of: February 10, 2021               Content Version: 13.0  © 6026-3508 [employer]. Care instructions adapted under license by [employer] (which disclaims liability or warranty for this information). If you have questions about a medical condition or this instruction, always ask your healthcare professional. [place] [address] any warranty or liability for your use of this information.

## 2022-02-23 NOTE — PROGRESS NOTES
Pam Julian is a 35 y.o. female presenting for/with:    Chief Complaint   Patient presents with    Ear Pain     treated     Cough     since last tuesday morning    Hoarse     itchy throat       Visit Vitals  BP (!) 140/68   Pulse 100   Temp 97.3 °F (36.3 °C) (Temporal)   Resp 20   Ht 5' 5\" (1.651 m)   Wt 184 lb 3.2 oz (83.6 kg)   SpO2 99%   BMI 30.65 kg/m²     Pain Scale: 7/10  Pain Location: Ear    1. Have you been to the ER, urgent care clinic since your last visit? Hospitalized since your last visit? NO    2. Have you seen or consulted any other health care providers outside of the [address] [place] [address] since your last visit? Include any pap smears or colon screening. NO    Symptom review:  NO  Fever   NO  Shaking chills  NO  Cough  NO  Body aches  NO  Coughing up blood  NO  Chest congestion  NO  Chest pain  NO  Shortness of breath  NO  Profound Loss of smell/taste  NO  Nausea/Vomiting   NO  Loose stool/Diarrhea  NO  any skin issues    Patient Risk Factors Reviewed as follows:  NO  have you been in Close contact with confirmed COVID19 patient   NO  History of recent travel to affected geographical areas within the past 14 days  NO  COPD  NO  Active Cancer/Leukemia/Lymphoma/Chemotherapy  NO  Oral steroid use  NO  Pregnant  NO  Diabetes Mellitus  NO  Heart disease  NO  Asthma  NO Health care worker at home  [address] care worker  NO Is there a Pregnant Woman in the home  NO Dialysis pt in the home   No a large number of people living in the home    Learning Assessment 12/10/2021   PRIMARY LEARNER Patient   HIGHEST LEVEL OF EDUCATION - PRIMARY LEARNER  GRADUATED HIGH SCHOOL OR GED   BARRIERS PRIMARY LEARNER NONE   CO-LEARNER CAREGIVER No   PRIMARY LANGUAGE ENGLISH   LEARNER PREFERENCE PRIMARY READING     -   ANSWERED BY Patient   RELATIONSHIP SELF     Fall Risk Assessment, last 12 mths 2/15/2022   Able to walk? Yes   Fall in past 12 months? 0   Do you feel unsteady?  0   Are you worried about falling 0       3 most recent PHQ Screens 2/23/2022   Little interest or pleasure in doing things Not at all   Feeling down, depressed, irritable, or hopeless Not at all   Total Score PHQ 2 0   Trouble falling or staying asleep, or sleeping too much -   Feeling tired or having little energy -   Poor appetite, weight loss, or overeating -   Feeling bad about yourself - or that you are a failure or have let yourself or your family down -   Trouble concentrating on things such as school, work, reading, or watching TV -   Moving or speaking so slowly that other people could have noticed; or the opposite being so fidgety that others notice -   Thoughts of being better off dead, or hurting yourself in some way -   PHQ 9 Score -     Abuse Screening Questionnaire 2/23/2022   Do you ever feel afraid of your partner? N   Are you in a relationship with someone who physically or mentally threatens you? N   Is it safe for you to go home? Y       ADL Assessment 2/23/2022   Feeding yourself No Help Needed   Getting from bed to chair No Help Needed   Getting dressed No Help Needed   Bathing or showering No Help Needed   Walk across the room (includes cane/walker) No Help Needed   Using the telphone No Help Needed   Taking your medications No Help Needed   Preparing meals No Help Needed   Managing money (expenses/bills) No Help Needed   Moderately strenuous housework (laundry) No Help Needed   Shopping for personal items (toiletries/medicines) No Help Needed   Shopping for groceries No Help Needed   Driving No Help Needed   Climbing a flight of stairs No Help Needed   Getting to places beyond walking distances No Help Needed      No advanced directives on file. Verified emergency contact.

## 2022-02-24 ENCOUNTER — TELEPHONE (OUTPATIENT)
Dept: FAMILY MEDICINE CLINIC | Age: 42
End: 2022-02-24

## 2022-02-25 RX ORDER — PROMETHAZINE HYDROCHLORIDE AND DEXTROMETHORPHAN HYDROBROMIDE 6.25; 15 MG/5ML; MG/5ML
5 SYRUP ORAL
Qty: 120 ML | Refills: 0 | Status: SHIPPED | OUTPATIENT
Start: 2022-02-25 | End: 2022-03-04

## 2022-02-25 NOTE — TELEPHONE ENCOUNTER
PC to [address] [place] [address], she was informed of Winifred response stated OK thanks. The Ipratropium was accidentally keyed in as a 10 day supply, but is aware of 15 = 1 canister, so it has been corrected and everything is good and the second medication is as well with the Qty of 120. Winifred verbally informed, stated OK and thanks.

## 2022-02-25 NOTE — TELEPHONE ENCOUNTER
1st call to the  plan, was not able to stay on hold, so had to retry the call,   PC x 2 to [place], 2nd time transferred to [facility], medication for Promethazine exceeds its limit of 120 ml for coverage, pending result up to 4 business days, [id number]. The Ipratropium exceeds its limit of 1 as well, pending review [id number] up to 4 business days or a new Rx can be written with the allowed  Qty limit  amount for per insurance coverage. Message routed to [facility] for [address] [address] [place].

## 2022-02-25 NOTE — TELEPHONE ENCOUNTER
ABIGAIL HERNANDEZ Homero to obtain the Key # to start a PA in the CoverMyMeds  He stated there is no Key # available, but can call the plan.

## 2022-02-25 NOTE — TELEPHONE ENCOUNTER
Per Winifred, she will redo the Rx's with the Qty limit amount that is approved by the patient's insurance. She did state the Ipratropium is done in [place], so she ordered what is equal to 1 canister which is 15 ml, she is not trying to order 15 canisters. I stated to her, I will call the pharmacy to inform them of that concerning the Rx. She stated OK and thanks.

## 2022-03-18 PROBLEM — Z51.6 ALLERGY DESENSITIZATION THERAPY: Status: ACTIVE | Noted: 2017-08-08

## 2022-03-18 PROBLEM — E55.9 VITAMIN D DEFICIENCY: Status: ACTIVE | Noted: 2019-01-21

## 2022-03-19 PROBLEM — L02.91 ABSCESS: Status: ACTIVE | Noted: 2019-04-06

## 2022-03-19 PROBLEM — F33.9 RECURRENT DEPRESSION (HCC): Status: ACTIVE | Noted: 2018-12-20

## 2022-04-26 ENCOUNTER — VIRTUAL VISIT (OUTPATIENT)
Dept: FAMILY MEDICINE CLINIC | Age: 42
End: 2022-04-26
Payer: COMMERCIAL

## 2022-04-26 DIAGNOSIS — H69.82 DYSFUNCTION OF LEFT EUSTACHIAN TUBE: Primary | ICD-10-CM

## 2022-04-26 DIAGNOSIS — J30.1 SEASONAL ALLERGIC RHINITIS DUE TO POLLEN: ICD-10-CM

## 2022-04-26 DIAGNOSIS — H65.92 LEFT OTITIS MEDIA WITH EFFUSION: ICD-10-CM

## 2022-04-26 PROCEDURE — 99214 OFFICE O/P EST MOD 30 MIN: CPT | Performed by: NURSE PRACTITIONER

## 2022-04-26 RX ORDER — AMOXICILLIN AND CLAVULANATE POTASSIUM 875; 125 MG/1; MG/1
1 TABLET, FILM COATED ORAL 2 TIMES DAILY
Qty: 20 TABLET | Refills: 0 | Status: SHIPPED | OUTPATIENT
Start: 2022-04-26 | End: 2022-05-06

## 2022-04-26 RX ORDER — KETOROLAC TROMETHAMINE 10 MG/1
10 TABLET, FILM COATED ORAL
Qty: 15 TABLET | Refills: 0 | Status: SHIPPED | OUTPATIENT
Start: 2022-04-26 | End: 2022-05-12

## 2022-04-26 NOTE — PATIENT INSTRUCTIONS
Here is your plan of attack:    Plain Claritin 10 mg every morning. Flonase nasal spray one spray in each nostril every morning. Montelukast 10 mg every night. Take the antibiotic Augmentin twice a day with food. I sent in ketorolac for pain. Take this up to three times a day as needed for the next five days. It is ok to use Tylenol with this medication. If you are not doing better in 10 days, we will add on prednisone. Let me know.

## 2022-04-26 NOTE — PROGRESS NOTES
Adele Koenig is a 35 y.o. female who was seen by synchronous (real-time) audio-video technology on 4/26/2022. This encounter was conducted via [employer] me. Consent:  Adele Koenig, who was evaluated through a synchronous (real-time) audio-video encounter, and/or her healthcare decision maker, is aware that it is a billable service, which includes applicable co-pays, with coverage as determined by her insurance carrier. She provided verbal consent to proceed and patient identification was verified. This visit was conducted pursuant to the emergency declaration under the [facility], [address] [place] [address] authority and the [employer] and [employer] General Act. A caregiver was present when appropriate. Ability to conduct physical exam was limited. The patient was located at home in a state where the provider was licensed to provide care. --Shani Rasheed NP on 4/26/2022 at 9:56 AM    I was in the office while conducting this encounter. 712  Subjective:   HPI: Adele Koenig was seen for Cold Symptoms (cough)    Patient is seen today with a CC of L ear pain and pressure x several months, worsening over the past few weeks. She is currently using Flonase, Singulair and Claritin D as well as Tylenol and ibuprofen. The ear is throbbing. She is not able to go to work due to the pain. She reports her \"typical\" sinus symptoms. Afebrile. Allergies   Allergen Reactions    Azithromycin Swelling     z pack       Current Outpatient Medications   Medication Sig    amoxicillin-clavulanate (AUGMENTIN) 875-125 mg per tablet Take 1 Tablet by mouth two (2) times a day for 10 days.  ketorolac (TORADOL) 10 mg tablet Take 1 Tablet by mouth three (3) times daily as needed for Pain.     buPROPion XL (WELLBUTRIN XL) 150 mg tablet TAKE 1 TABLET BY MOUTH DAILY    ipratropium (ATROVENT) 42 mcg (0.06 %) nasal spray 2 Sprays by Both Nostrils route four (4) times daily.  fluticasone propionate (FLONASE) 50 mcg/actuation nasal spray Two sprays to each nostril once daily    montelukast (SINGULAIR) 10 mg tablet Take 1 Tablet by mouth daily.  predniSONE (DELTASONE) 10 mg tablet 2 tabs every day x 7 days then 1 tab PO every day x 7 days    acetaminophen (TYLENOL EXTRA STRENGTH) 500 mg tablet Take  by mouth every six (6) hours as needed for Pain.  levonorgestrel (MIRENA) 20 mcg/24 hr (5 years) IUD 1 Each by IntraUTERine route once. No current facility-administered medications for this visit. Past Medical History:   Diagnosis Date    Anemia NEC     IRON DEF ANEMIA    Depression complicating pregnancy, antepartum 9/12/2012    H/O cold sores     HX OTHER MEDICAL     BELLS PALSY 05/2010, CARPEL TUNNEL RIGHT WRIST    IUGR (intrauterine growth restriction) 9/12/2012    Oligohydramnios antepartum 9/12/2012    Psychiatric problem     DEPRESSION, ANXIETY       Family History   Problem Relation Age of Onset    Diabetes Mother     Hypertension Mother     Diabetes Maternal Grandmother     Hypertension Maternal Grandmother     Diabetes Maternal Grandfather     Hypertension Maternal Grandfather     OSTEOARTHRITIS Father        ROS:  Denies fever, chills, + cough, chest pain, SOB,  nausea, vomiting, diarrhea, dysuria. Denies rashes, wounds, arthralgias, weakness, numbness, visual changes, depression. Denies wt loss, wt gain, hemoptysis, hematochezia or melena. Patient is not experiencing chest pain radiating to the jaw and/or down the arms. PHYSICAL EXAMINATION:    Vital Signs: (As obtained by patient/caregiver at home)  There were no vitals taken for this visit.      Constitutional: [x] Appears well-developed and well-nourished [x] No apparent distress      [] Abnormal -     Mental status: [x] Alert and awake  [x] Oriented to person/place/time [x] Able to follow commands    [] Abnormal -     Eyes:   EOM    [x]  Normal    [] Abnormal -   Sclera  [x] Normal    [] Abnormal -          Discharge [x]  None visible   [] Abnormal -     HENT: [x] Normocephalic, atraumatic  [] Abnormal -   [x] Mouth/Throat: Mucous membranes are moist    External Ears [x] Normal  [] Abnormal -   Patient came in to the office for an exam of her ears. R TM mildly erythematous, L TM bulging, opaque. Neck: [x] No visualized mass [] Abnormal -     Pulmonary/Chest: [x] Respiratory effort normal   [x] No visualized signs of difficulty breathing or respiratory distress        [] Abnormal -      Musculoskeletal:   [x] Normal gait with no signs of ataxia         [x] Normal range of motion of neck        [] Abnormal -     Neurological:        [x] No Facial Asymmetry (Cranial nerve 7 motor function) (limited exam due to video visit)          [x] No gaze palsy        [] Abnormal -          Skin:        [x] No significant exanthematous lesions or discoloration noted on facial skin         [] Abnormal -            Psychiatric:       [x] Normal Affect [] Abnormal -        [x] No Hallucinations    Other pertinent observable physical exam findings:-        Assessment & Plan:       ICD-10-CM ICD-9-CM    1. Dysfunction of left eustachian tube  H69.82 381.81 amoxicillin-clavulanate (AUGMENTIN) 875-125 mg per tablet      ketorolac (TORADOL) 10 mg tablet   2. Left otitis media with effusion  H65.92 381.4 amoxicillin-clavulanate (AUGMENTIN) 875-125 mg per tablet      ketorolac (TORADOL) 10 mg tablet   3. Seasonal allergic rhinitis due to pollen  J30.1 477.0 amoxicillin-clavulanate (AUGMENTIN) 875-125 mg per tablet      ketorolac (TORADOL) 10 mg tablet     Plain Claritin 10 mg every morning. Flonase nasal spray one spray in each nostril every morning. Montelukast 10 mg every night. Take the antibiotic Augmentin twice a day with food. I sent in ketorolac for pain. Take this up to three times a day as needed for the next five days. It is ok to use Tylenol with this medication.  If you are not doing better in 10 days, we will add on prednisone. Let me know. Patient aware of plan of care and verbalized understanding. Questions answered. RTC PRN. We discussed the expected course, resolution and complications of the diagnosis(es) in detail. Medication risks, benefits, costs, interactions, and alternatives were discussed as indicated. I advised her to contact the office if her condition worsens, changes or fails to improve as anticipated. She expressed understanding with the diagnosis(es) and plan. Pursuant to the emergency declaration under the [facility], [facility] waiver authority and the [employer] Act, this Virtual  Visit was conducted, with patient's consent, to reduce the patient's risk of exposure to COVID-19 and provide continuity of care for an established patient. Services were provided through a video synchronous discussion virtually to substitute for in-person clinic visit.     Jessenia Alvarez NP

## 2022-04-26 NOTE — PROGRESS NOTES
Chief Complaint   Patient presents with    Cold Symptoms     cough     1. Have you been to the ER, urgent care clinic since your last visit? Hospitalized since your last visit? No    2. Have you seen or consulted any other health care providers outside of the [address] [place] [address] since your last visit? Include any pap smears or colon screening. No  Abuse Screening Questionnaire 4/26/2022   Do you ever feel afraid of your partner? N   Are you in a relationship with someone who physically or mentally threatens you? N   Is it safe for you to go home? Y     3 most recent PHQ Screens 4/26/2022   Little interest or pleasure in doing things Not at all   Feeling down, depressed, irritable, or hopeless Not at all   Total Score PHQ 2 0   Trouble falling or staying asleep, or sleeping too much -   Feeling tired or having little energy -   Poor appetite, weight loss, or overeating -   Feeling bad about yourself - or that you are a failure or have let yourself or your family down -   Trouble concentrating on things such as school, work, reading, or watching TV -   Moving or speaking so slowly that other people could have noticed; or the opposite being so fidgety that others notice -   Thoughts of being better off dead, or hurting yourself in some way -   PHQ 9 Score -     Learning Assessment 4/26/2022   PRIMARY LEARNER Patient   HIGHEST LEVEL OF EDUCATION - PRIMARY LEARNER  SOME COLLEGE   BARRIERS PRIMARY LEARNER NONE   CO-LEARNER CAREGIVER No   PRIMARY LANGUAGE ENGLISH   LEARNER PREFERENCE PRIMARY READING     -   ANSWERED BY Patient   RELATIONSHIP SELF     Fall Risk Assessment, last 12 mths 4/26/2022   Able to walk? Yes   Fall in past 12 months? 0   Do you feel unsteady?  0   Are you worried about falling 0

## 2022-04-26 NOTE — LETTER
NOTIFICATION RETURN TO WORK / SCHOOL    4/26/2022 9:47 AM    Ms. [address] [place] [address] [address]      To Whom It May Concern:    Master Michel is currently under the care of Emilie Byrnes. She will return to work/school on: 4/27/22    If there are questions or concerns please have the patient contact our office.         Sincerely,      Tonio Scherer NP

## 2022-05-12 ENCOUNTER — OFFICE VISIT (OUTPATIENT)
Dept: FAMILY MEDICINE CLINIC | Age: 42
End: 2022-05-12
Payer: COMMERCIAL

## 2022-05-12 ENCOUNTER — TELEPHONE (OUTPATIENT)
Dept: FAMILY MEDICINE CLINIC | Age: 42
End: 2022-05-12

## 2022-05-12 ENCOUNTER — NURSE TRIAGE (OUTPATIENT)
Dept: OTHER | Facility: CLINIC | Age: 42
End: 2022-05-12

## 2022-05-12 VITALS
RESPIRATION RATE: 20 BRPM | HEART RATE: 99 BPM | OXYGEN SATURATION: 99 % | SYSTOLIC BLOOD PRESSURE: 122 MMHG | HEIGHT: 65 IN | TEMPERATURE: 97.2 F | DIASTOLIC BLOOD PRESSURE: 90 MMHG | BODY MASS INDEX: 27.99 KG/M2 | WEIGHT: 168 LBS

## 2022-05-12 DIAGNOSIS — B00.9 HERPES SIMPLEX INFECTION: ICD-10-CM

## 2022-05-12 PROCEDURE — 99214 OFFICE O/P EST MOD 30 MIN: CPT | Performed by: NURSE PRACTITIONER

## 2022-05-12 PROCEDURE — 96372 THER/PROPH/DIAG INJ SC/IM: CPT | Performed by: NURSE PRACTITIONER

## 2022-05-12 RX ORDER — PREDNISONE 10 MG/1
TABLET ORAL
Qty: 18 TABLET | Refills: 0 | Status: SHIPPED | OUTPATIENT
Start: 2022-05-12 | End: 2022-07-20 | Stop reason: ALTCHOICE

## 2022-05-12 RX ORDER — VALACYCLOVIR HYDROCHLORIDE 1 G/1
TABLET, FILM COATED ORAL
Qty: 24 TABLET | Refills: 4 | Status: SHIPPED | OUTPATIENT
Start: 2022-05-12

## 2022-05-12 RX ORDER — METHYLPREDNISOLONE ACETATE 40 MG/ML
40 INJECTION, SUSPENSION INTRA-ARTICULAR; INTRALESIONAL; INTRAMUSCULAR; SOFT TISSUE ONCE
Status: COMPLETED | OUTPATIENT
Start: 2022-05-12 | End: 2022-05-12

## 2022-05-12 RX ORDER — LORATADINE 10 MG/1
10 TABLET ORAL
COMMUNITY
End: 2022-09-30

## 2022-05-12 RX ADMIN — METHYLPREDNISOLONE ACETATE 40 MG: 40 INJECTION, SUSPENSION INTRA-ARTICULAR; INTRALESIONAL; INTRAMUSCULAR; SOFT TISSUE at 16:07

## 2022-05-12 NOTE — TELEPHONE ENCOUNTER
Pt calling back stating she is finished the medication Cliffordterri Quiñonez gave her for her ear and its still no better also she has a cold sore and and is out of medication for that , she doesn't know the name of med she states jake told her she will refill it when she needed it.  She would like that refilled

## 2022-05-12 NOTE — PROGRESS NOTES
Patient was administered methlyprednisolone 40 mg via IM in left Deltoid. Patient tolerated medication . Medication information reviewed with patient, patient states understanding. Patient to resume routine medications at home. Patient given copy of AVS with medication information and instructions for home. AVS reviewed with patient and patient states understanding. 1. \"Have you been to the ER, urgent care clinic since your last visit? Hospitalized since your last visit? \" No    2. \"Have you seen or consulted any other health care providers outside of the [address] [place] [address] since your last visit? \" No     3. For patients aged 39-70: Has the patient had a colonoscopy / FIT/ Cologuard? NA - based on age      If the patient is female:    4. For patients aged 41-77: Has the patient had a mammogram within the past 2 years? NA - based on age or sex      11. For patients aged 21-65: Has the patient had a pap smear?   Yes, care Gap present singed to get last result

## 2022-05-12 NOTE — TELEPHONE ENCOUNTER
Received call from [place] at [facility] with Red Flag Complaint. Subjective: Caller states \"has a cold sore that reappears every couple of years. Has some swelling on left side of nose. Has left ear pain and fullness. Feels like there is fluid in left ear\". Sometimes when she wakes up she has pain in both ears. Would like to get an xray    Current Symptoms: see above    Onset: 1 month ago; improving    Associated Symptoms: NA    Pain Severity: 3/10; pressure; constant    Temperature:denies fever    What has been tried: prescribed meds    LMP: NA Pregnant: No    Recommended disposition: See in Office Today or Tomorrow    Care advice provided, patient verbalizes understanding; denies any other questions or concerns; instructed to call back for any new or worsening symptoms. Patient/Caller agrees with recommended disposition; writer provided warm transfer to [place] at [facility] for appointment scheduling    Attention Provider: Thank you for allowing me to participate in the care of your patient. The patient was connected to triage in response to information provided to the [facility]. Please do not respond through this encounter as the response is not directed to a shared pool.       Reason for Disposition   All other earaches (Exceptions: earache lasting < 1 hour, and earache from air travel)    Protocols used: EARACHE-ADULT-OH

## 2022-05-13 NOTE — PROGRESS NOTES
Subjective:     Umberto Barrett is a 35 y.o. female who presents today with the following:  Chief Complaint   Patient presents with    Skin Ulcer     nose cold sore    Ear Pain     right with fullness       Patient Active Problem List   Diagnosis Code    H/O  section Z98.891    Environmental and seasonal allergies J30.89    Insomnia due to stress F51.02    Allergy desensitization therapy Z51.6    Recurrent depression ([address] F33.9    Vitamin D deficiency E55.9    Abscess L02.91         COMPLIANT WITH MEDICATION:   HTN; Denies chest pain, dyspnea, palpitations, headache and blurred vision. Blood pressure improved . Continue BP medication as prescribed. Nose; cold sore ,herpatic infection responds well to valtrex. Ear fullness; recently completed treatment of otitis. She endorses a continues t sensation of fullness in her right ear    depression screening addressed not at risk    abuse screening addressed denies    learning assessment addressed reviewed nurses notes    fall risk addressed not at risk    HM: addressed declines HM interventions today.      ROS:  Gen: denies fever, chills, fatigue, weight loss, weight gain  HEENT:denies blurry vision, nasal congestion, sore throat  Resp: denies dypsnea, cough, wheezing  CV: denies chest pain radiating to the jaws or arms, palpitations, lower extremity edema  Abd: denies nausea, vomiting, diarrhea, constipation  Neuro: denies numbness/tingling  Endo: denies polyuria, polydipsia, heat/cold intolerance  Heme: no lymphadenopathy    Allergies   Allergen Reactions    Azithromycin Swelling     z pack         Current Outpatient Medications:     loratadine (Claritin) 10 mg tablet, Take 10 mg by mouth., Disp: , Rfl:     predniSONE (DELTASONE) 10 mg tablet, 3 tabs PO every day x 3 days, then 2 tab PO every day x 3 days, then 1 tab PO every day x 3 days, Disp: 18 Tablet, Rfl: 0    valACYclovir (VALTREX) 1 gram tablet, 2 tablets initially then 2 tablets q 12 hours as needed for lesion  Indications: a cold sore, Disp: 24 Tablet, Rfl: 4    buPROPion XL (WELLBUTRIN XL) 150 mg tablet, TAKE 1 TABLET BY MOUTH DAILY, Disp: 30 Tablet, Rfl: 5    fluticasone propionate (FLONASE) 50 mcg/actuation nasal spray, Two sprays to each nostril once daily, Disp: 1 Each, Rfl: 3    acetaminophen (TYLENOL EXTRA STRENGTH) 500 mg tablet, Take  by mouth every six (6) hours as needed for Pain., Disp: , Rfl:     levonorgestrel (MIRENA) 20 mcg/24 hr (5 years) IUD, 1 Each by IntraUTERine route once., Disp: , Rfl:     ipratropium (ATROVENT) 42 mcg (0.06 %) nasal spray, 2 Sprays by Both Nostrils route four (4) times daily. (Patient not taking: Reported on 2022), Disp: 15 mL, Rfl: 1    montelukast (SINGULAIR) 10 mg tablet, Take 1 Tablet by mouth daily. (Patient not taking: Reported on 2022), Disp: 30 Tablet, Rfl: 1  No current facility-administered medications for this visit. Past Medical History:   Diagnosis Date    Anemia NEC     IRON DEF ANEMIA    Depression complicating pregnancy, antepartum 2012    H/O cold sores     HX OTHER MEDICAL     BELLS PALSY 2010, CARPEL TUNNEL RIGHT WRIST    IUGR (intrauterine growth restriction) 2012    Oligohydramnios antepartum 2012    Psychiatric problem     DEPRESSION, ANXIETY       Past Surgical History:   Procedure Laterality Date    HX OTHER SURGICAL      HERNIA REPAIR (\"AS A CHILD\")    HX SEPTOPLASTY  06/10/2019    [place]  DELIVERY ONLY         Social History     Tobacco Use   Smoking Status Passive Smoke Exposure - Never Smoker   Smokeless Tobacco Never Used   Tobacco Comment     smokes but not in the house       Social History     Socioeconomic History    Marital status:    Tobacco Use    Smoking status: Passive Smoke Exposure - Never Smoker    Smokeless tobacco: Never Used    Tobacco comment:  smokes but not in the house   Substance and Sexual Activity    Alcohol use:  Yes Comment: social drinker    Drug use: No    Sexual activity: Yes     Partners: Male     Social Determinants of Health     Financial Resource Strain: Medium Risk    Difficulty of Paying Living Expenses: Somewhat hard   Food Insecurity: No Food Insecurity    Worried About Running Out of Food in the Last Year: Never true    [place] of Food in the Last Year: Never true   Transportation Needs: No Transportation Needs    Lack of Transportation (Medical): No    Lack of Transportation (Non-Medical): No       Family History   Problem Relation Age of Onset    Diabetes Mother     Hypertension Mother     Diabetes Maternal Grandmother     Hypertension Maternal Grandmother     Diabetes Maternal Grandfather     Hypertension Maternal Grandfather     OSTEOARTHRITIS Father          Objective:     Visit Vitals  BP (!) 122/90 (BP 1 Location: Left upper arm, BP Patient Position: Sitting, BP Cuff Size: Large adult)   Pulse 99   Temp 97.2 °F (36.2 °C) (Temporal)   Resp 20   Ht 5' 5\" (1.651 m)   Wt 168 lb (76.2 kg)   SpO2 99%   BMI 27.96 kg/m²     Body mass index is 27.96 kg/m². General: Alert and oriented. No acute distress. Well nourished  HEENT :  Ears: Left TM is  normal. Canals are clear. Right TM no exudate or erythema, Just copious clear fluid  Eyes: pupils equal, round, react to light and accommodation. Extra ocular movements intact. Nose: patent. Mouth and throat is clear. Neck:supple full range of motion no thyromegaly. Trachea midline, No carotid bruits. No significant lymphadenopathy  Lungs[de-identified] clear to auscultation without wheezes, rales, or rhonchi. Heart :RRR, S1 & S2 are normal intensity. No murmur; no gallop  Abdomen: bowel sounds active. No tenderness, guarding, rebound, masses, hepatic or spleen enlargement  Back: no CVA tenderness. Extremities: without clubbing, cyanosis, or edema  Pulses: radial and femoral pulses are normal  Neuro: HMF intact.  Cranial nerves II through XII grossly normal.  Motor: is 5 over 5 and symmetrical.   Deep tendon reflexes: +2 equal  Psych:appropriate behavior, mood, affect and judgement. Results for orders placed or performed in visit on 20   AMB POC RAPID STREP A   Result Value Ref Range    VALID INTERNAL CONTROL POC Yes     Group A Strep Ag Positive Negative       No results found for this visit on 22. Assessment/ Plan:     1. Herpes simplex infection    - valACYclovir (VALTREX) 1 gram tablet; 2 tablets initially then 2 tablets q 12 hours as needed for lesion  Indications: a cold sore  Dispense: 24 Tablet; Refill: 4    2. effuision right ear ; Infection resolved we discussed trying another course of steroids to help decreasing clear fluid in the ear . Otitis resolvved       Orders Placed This Encounter    loratadine (Claritin) 10 mg tablet     Sig: Take 10 mg by mouth.  predniSONE (DELTASONE) 10 mg tablet     Sig: 3 tabs PO every day x 3 days, then 2 tab PO every day x 3 days, then 1 tab PO every day x 3 days     Dispense:  18 Tablet     Refill:  0    methylPREDNISolone acetate (DEPO-MEDROL) 40 mg/mL injection 40 mg    valACYclovir (VALTREX) 1 gram tablet     Si tablets initially then 2 tablets q 12 hours as needed for lesion  Indications: a cold sore     Dispense:  24 Tablet     Refill:  4         Verbal and written instructions (see AVS) provided. Patient expresses understanding of diagnosis and treatment plan.     Health Maintenance Due   Topic Date Due    Hepatitis C Screening  Never done    Cervical cancer screen  Never done    Lipid Screen  2020               Jeannie Melchor, FNP-C

## 2022-05-13 NOTE — ACP (ADVANCE CARE PLANNING)
Has advanced medical directive scanned into chart. Reviewed at this visit. No changes.  Fernando SILVESTREC

## 2022-07-20 ENCOUNTER — VIRTUAL VISIT (OUTPATIENT)
Dept: FAMILY MEDICINE CLINIC | Age: 42
End: 2022-07-20
Payer: COMMERCIAL

## 2022-07-20 DIAGNOSIS — J02.9 SORE THROAT: ICD-10-CM

## 2022-07-20 DIAGNOSIS — G44.209 ACUTE NON INTRACTABLE TENSION-TYPE HEADACHE: ICD-10-CM

## 2022-07-20 DIAGNOSIS — H92.09 EAR ACHE: Primary | ICD-10-CM

## 2022-07-20 LAB
EXP DATE SOLUTION: NORMAL
EXP DATE SWAB: NORMAL
LOT NUMBER SOLUTION: NORMAL
LOT NUMBER SWAB: NORMAL
S PYO AG THROAT QL: NEGATIVE
SARS-COV-2 RNA POC: NEGATIVE
VALID INTERNAL CONTROL?: YES

## 2022-07-20 PROCEDURE — 99443 PR PHYS/QHP TELEPHONE EVALUATION 21-30 MIN: CPT | Performed by: NURSE PRACTITIONER

## 2022-07-20 PROCEDURE — 87880 STREP A ASSAY W/OPTIC: CPT | Performed by: NURSE PRACTITIONER

## 2022-07-20 PROCEDURE — 87635 SARS-COV-2 COVID-19 AMP PRB: CPT | Performed by: NURSE PRACTITIONER

## 2022-07-20 RX ORDER — DEXAMETHASONE SODIUM PHOSPHATE 4 MG/ML
4 INJECTION, SOLUTION INTRA-ARTICULAR; INTRALESIONAL; INTRAMUSCULAR; INTRAVENOUS; SOFT TISSUE ONCE
Status: SHIPPED | OUTPATIENT
Start: 2022-07-20 | End: 2022-07-21

## 2022-07-20 RX ORDER — METHYLPREDNISOLONE 4 MG/1
TABLET ORAL
Qty: 1 DOSE PACK | Refills: 0 | Status: SHIPPED | OUTPATIENT
Start: 2022-07-20 | End: 2022-09-30 | Stop reason: ALTCHOICE

## 2022-07-20 NOTE — PROGRESS NOTES
Dexamethasone Sodium Phosphate, 4 mg / ml, given left arm deltoid, Lot # [id number], Exp 05/2023, NDC 52244-736-24, [employer], Pt tolerated OK. I was not able to document in [place].

## 2022-07-20 NOTE — PROGRESS NOTES
Chief Complaint   Patient presents with    Headache    Ear Pain     both     1. \"Have you been to the ER, urgent care clinic since your last visit? Hospitalized since your last visit? \"  no    2. \"Have you seen or consulted any other health care providers outside of the [address] [place] [address] since your last visit? \"  no    3. For patients aged 39-70: Has the patient had a colonoscopy / FIT/ Cologuard? NA      If the patient is female:    4. For patients aged 41-77: Has the patient had a mammogram within the past 2 years? Yes no care gap present      5. For patients aged 21-65: Has the patient had a pap smear? Yes, Care Gap present will schedule  Abuse Screening Questionnaire 4/26/2022   Do you ever feel afraid of your partner? N   Are you in a relationship with someone who physically or mentally threatens you? N   Is it safe for you to go home?  Y     3 most recent PHQ Screens 7/20/2022   Little interest or pleasure in doing things Not at all   Feeling down, depressed, irritable, or hopeless Not at all   Total Score PHQ 2 0   Trouble falling or staying asleep, or sleeping too much -   Feeling tired or having little energy -   Poor appetite, weight loss, or overeating -   Feeling bad about yourself - or that you are a failure or have let yourself or your family down -   Trouble concentrating on things such as school, work, reading, or watching TV -   Moving or speaking so slowly that other people could have noticed; or the opposite being so fidgety that others notice -   Thoughts of being better off dead, or hurting yourself in some way -   PHQ 9 Score -

## 2022-07-21 ENCOUNTER — TELEPHONE (OUTPATIENT)
Dept: FAMILY MEDICINE CLINIC | Age: 42
End: 2022-07-21

## 2022-07-21 RX ORDER — NAPROXEN 500 MG/1
500 TABLET, DELAYED RELEASE ORAL 2 TIMES DAILY WITH MEALS
Qty: 30 TABLET | Refills: 0 | Status: SHIPPED | OUTPATIENT
Start: 2022-07-21 | End: 2022-09-30

## 2022-07-23 NOTE — PROGRESS NOTES
Prabha Braun is a 35 y.o. female, evaluated via audio-only technology on 7/20/2022 for Headache and Ear Pain (both)  . x a couple days. She denies fever , body aches and chills. 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 Potential exposure for COVID 19. Assessment & Plan:   Diagnoses and all orders for this visit:    1. Ear ache  -     AMB POC COVID-19 COV  -     AMB POC RAPID STREP A    2. Acute non intractable tension-type headache  -     AMB POC COVID-19 COV  -     AMB POC RAPID STREP A    3. Sore throat  -     AMB POC COVID-19 COV  -     AMB POC RAPID STREP A    Other orders  -     methylPREDNISolone (MEDROL DOSEPACK) 4 mg tablet; Per dose pack instructions  -     dexamethasone (DECADRON) 4 mg/mL injection 4 mg  -     naproxen EC (NAPROSYN EC) 500 mg EC tablet; Take 1 Tablet by mouth two (2) times daily (with meals). The complexity of medical decision making for this visit is moderate       12  Subjective:       Prior to Admission medications    Medication Sig Start Date End Date Taking? Authorizing Provider   naproxen EC (NAPROSYN EC) 500 mg EC tablet Take 1 Tablet by mouth two (2) times daily (with meals). 7/21/22  Yes Julian Grey NP   methylPREDNISolone (MEDROL DOSEPACK) 4 mg tablet Per dose pack instructions 7/20/22  Yes Vernon RILEY NP   fluticasone propionate (FLONASE) 50 mcg/actuation nasal spray SHAKE LIQUID AND USE 2 SPRAYS IN EACH NOSTRIL EVERY DAY 7/18/22  Yes Winifred Cerda NP   montelukast (SINGULAIR) 10 mg tablet TAKE 1 TABLET BY MOUTH DAILY 5/17/22  Yes Winifred Cerda NP   loratadine (CLARITIN) 10 mg tablet Take 10 mg by mouth.    Yes Provider, Historical   valACYclovir (VALTREX) 1 gram tablet 2 tablets initially then 2 tablets q 12 hours as needed for lesion  Indications: a cold sore 5/12/22  Yes Julian Grey NP   buPROPion XL (WELLBUTRIN XL) 150 mg tablet TAKE 1 TABLET BY MOUTH DAILY 3/16/22  Yes Jordon Jeffers H, NP   ipratropium (ATROVENT) 42 mcg (0.06 %) nasal spray 2 Sprays by Both Nostrils route four (4) times daily. 22  Yes Winifred Cerda H, NP   acetaminophen (TYLENOL) 500 mg tablet Take  by mouth every six (6) hours as needed for Pain. Yes Provider, Historical   levonorgestreL (MIRENA) 20 mcg/24 hours (7 yrs) 52 mg IUD 1 Each by IntraUTERine route once. Yes Provider, Historical     Patient Active Problem List   Diagnosis Code    H/O  section Z98.891    Environmental and seasonal allergies J30.89    Insomnia due to stress F51.02    Allergy desensitization therapy Z51.6    Recurrent depression (HCC) F33.9    Vitamin D deficiency E55.9    Abscess L02.91     Current Outpatient Medications   Medication Sig Dispense Refill    naproxen EC (NAPROSYN EC) 500 mg EC tablet Take 1 Tablet by mouth two (2) times daily (with meals). 30 Tablet 0    methylPREDNISolone (MEDROL DOSEPACK) 4 mg tablet Per dose pack instructions 1 Dose Pack 0    fluticasone propionate (FLONASE) 50 mcg/actuation nasal spray SHAKE LIQUID AND USE 2 SPRAYS IN EACH NOSTRIL EVERY DAY 16 g 5    montelukast (SINGULAIR) 10 mg tablet TAKE 1 TABLET BY MOUTH DAILY 90 Tablet 2    loratadine (CLARITIN) 10 mg tablet Take 10 mg by mouth. valACYclovir (VALTREX) 1 gram tablet 2 tablets initially then 2 tablets q 12 hours as needed for lesion  Indications: a cold sore 24 Tablet 4    buPROPion XL (WELLBUTRIN XL) 150 mg tablet TAKE 1 TABLET BY MOUTH DAILY 30 Tablet 5    ipratropium (ATROVENT) 42 mcg (0.06 %) nasal spray 2 Sprays by Both Nostrils route four (4) times daily. 15 mL 1    acetaminophen (TYLENOL) 500 mg tablet Take  by mouth every six (6) hours as needed for Pain.      levonorgestreL (MIRENA) 20 mcg/24 hours (7 yrs) 52 mg IUD 1 Each by IntraUTERine route once.        Allergies   Allergen Reactions    Azithromycin Swelling     z pack     Past Medical History:   Diagnosis Date    Anemia NEC     IRON DEF ANEMIA    Depression complicating pregnancy, antepartum 2012    H/O cold sores     HX OTHER MEDICAL     BELLS PALSY 2010, CARPEL TUNNEL RIGHT WRIST    IUGR (intrauterine growth restriction) 2012    Oligohydramnios antepartum 2012    Psychiatric problem     DEPRESSION, ANXIETY     Past Surgical History:   Procedure Laterality Date    HX OTHER SURGICAL      HERNIA REPAIR (\"AS A CHILD\")    HX SEPTOPLASTY  06/10/2019    [place]  DELIVERY ONLY       Family History   Problem Relation Age of Onset    Diabetes Mother     Hypertension Mother     Diabetes Maternal Grandmother     Hypertension Maternal Grandmother     Diabetes Maternal Grandfather     Hypertension Maternal Grandfather     OSTEOARTHRITIS Father      Social History     Tobacco Use    Smoking status: Passive Smoke Exposure - Never Smoker    Smokeless tobacco: Never    Tobacco comments:      smokes but not in the house   Substance Use Topics    Alcohol use: Yes     Comment: social drinker       ROS  Pertinent items are noted on the HPI. No data recorded     Shilpa Cazares was evaluated through a patient-initiated, synchronous (real-time) audio only encounter. She (or guardian if applicable) is aware that it is a billable service, which includes applicable co-pays, with coverage as determined by her insurance carrier. This visit was conducted with the patient's (and/or Carmel Haskins guardian's) verbal consent. She has not had a related appointment within my department in the past 7 days or scheduled within the next 24 hours. The patient was located in a state where the provider was licensed to provide care.   The patient was located at: Home: [facility] [address] [facility] [address]  [facility] [id number] [id number]    Total Time: minutes: 21-30 minutes    Yadiel Gautam NP

## 2022-07-31 ENCOUNTER — HOSPITAL ENCOUNTER (EMERGENCY)
Age: 42
Discharge: HOME | End: 2022-07-31
Attending: EMERGENCY MEDICINE
Payer: COMMERCIAL

## 2022-07-31 VITALS
WEIGHT: 168 LBS | HEART RATE: 96 BPM | OXYGEN SATURATION: 100 % | TEMPERATURE: 98.6 F | RESPIRATION RATE: 16 BRPM | BODY MASS INDEX: 27.96 KG/M2

## 2022-07-31 DIAGNOSIS — L03.312 CELLULITIS OF LOWER BACK: Primary | ICD-10-CM

## 2022-07-31 DIAGNOSIS — H60.503 ACUTE OTITIS EXTERNA OF BOTH EARS, UNSPECIFIED TYPE: ICD-10-CM

## 2022-07-31 PROCEDURE — 99282 EMERGENCY DEPT VISIT SF MDM: CPT

## 2022-07-31 RX ORDER — MUPIROCIN 20 MG/G
OINTMENT TOPICAL DAILY
Qty: 22 G | Refills: 0 | Status: SHIPPED | OUTPATIENT
Start: 2022-07-31 | End: 2022-07-31

## 2022-07-31 RX ORDER — NEOMYCIN SULFATE, POLYMYXIN B SULFATE AND HYDROCORTISONE 10; 3.5; 1 MG/ML; MG/ML; [USP'U]/ML
3 SUSPENSION/ DROPS AURICULAR (OTIC) 4 TIMES DAILY
Qty: 10 ML | Refills: 0 | Status: SHIPPED | OUTPATIENT
Start: 2022-07-31 | End: 2022-07-31

## 2022-07-31 RX ORDER — CLINDAMYCIN HYDROCHLORIDE 300 MG/1
300 CAPSULE ORAL 3 TIMES DAILY
Qty: 21 CAPSULE | Refills: 0 | Status: SHIPPED | OUTPATIENT
Start: 2022-07-31 | End: 2022-07-31

## 2022-07-31 RX ORDER — CLINDAMYCIN HYDROCHLORIDE 300 MG/1
300 CAPSULE ORAL 3 TIMES DAILY
Qty: 21 CAPSULE | Refills: 0 | Status: SHIPPED | OUTPATIENT
Start: 2022-07-31 | End: 2022-08-07

## 2022-07-31 RX ORDER — MUPIROCIN 20 MG/G
OINTMENT TOPICAL DAILY
Qty: 22 G | Refills: 0 | Status: SHIPPED | OUTPATIENT
Start: 2022-07-31 | End: 2022-09-30

## 2022-07-31 RX ORDER — NEOMYCIN SULFATE, POLYMYXIN B SULFATE AND HYDROCORTISONE 10; 3.5; 1 MG/ML; MG/ML; [USP'U]/ML
3 SUSPENSION/ DROPS AURICULAR (OTIC) 4 TIMES DAILY
Qty: 10 ML | Refills: 0 | Status: SHIPPED | OUTPATIENT
Start: 2022-07-31 | End: 2022-08-07

## 2022-07-31 NOTE — ED PROVIDER NOTES
EMERGENCY DEPARTMENT HISTORY AND PHYSICAL EXAM      Date: 2022  Patient Name: Yari Hinton    History of Presenting Illness     Chief Complaint   Patient presents with    Skin Problem       History Provided By: Patient    HPI: Yari Hinton, 35 y.o. female with PMHx significant for iron deficiency anemia, presents ambulatory to the ED with cc of blistering lesion on back. Patient noted a blistering lesion on her back several days ago. She reports an aching and occasional sharp pain in the area. Pain is in her midline superior low back. No other areas affected. No fevers or chills. No known exposures. She works in a correctional facility. No cough or nasal congestion. No treatment prior to arrival.  She also reports that she had a virtual visit with her regular doctor for bilateral earaches on . Prescribed a Medrol Dosepak and naproxen and Flonase. She reports improvement but the condition still persists. PMHx: Significant for iron deficiency anemia  PSHx: Significant for . Social Hx: Non-smoker. Drinks alcohol socially. There are no other complaints, changes, or physical findings at this time. PCP: Marguerite Najera NP    No current facility-administered medications on file prior to encounter. Current Outpatient Medications on File Prior to Encounter   Medication Sig Dispense Refill    naproxen EC (NAPROSYN EC) 500 mg EC tablet Take 1 Tablet by mouth two (2) times daily (with meals). 30 Tablet 0    methylPREDNISolone (MEDROL DOSEPACK) 4 mg tablet Per dose pack instructions 1 Dose Pack 0    fluticasone propionate (FLONASE) 50 mcg/actuation nasal spray SHAKE LIQUID AND USE 2 SPRAYS IN EACH NOSTRIL EVERY DAY 16 g 5    montelukast (SINGULAIR) 10 mg tablet TAKE 1 TABLET BY MOUTH DAILY 90 Tablet 2    loratadine (CLARITIN) 10 mg tablet Take 10 mg by mouth.       valACYclovir (VALTREX) 1 gram tablet 2 tablets initially then 2 tablets q 12 hours as needed for lesion Indications: a cold sore 24 Tablet 4    buPROPion XL (WELLBUTRIN XL) 150 mg tablet TAKE 1 TABLET BY MOUTH DAILY 30 Tablet 5    ipratropium (ATROVENT) 42 mcg (0.06 %) nasal spray 2 Sprays by Both Nostrils route four (4) times daily. 15 mL 1    acetaminophen (TYLENOL) 500 mg tablet Take  by mouth every six (6) hours as needed for Pain.      levonorgestreL (MIRENA) 20 mcg/24 hours (7 yrs) 52 mg IUD 1 Each by IntraUTERine route once. Past History     Past Medical History:  Past Medical History:   Diagnosis Date    Anemia NEC     IRON DEF ANEMIA    Depression complicating pregnancy, antepartum 2012    H/O cold sores     HX OTHER MEDICAL     BELLS PALSY 2010, CARPEL TUNNEL RIGHT WRIST    IUGR (intrauterine growth restriction) 2012    Oligohydramnios antepartum 2012    Psychiatric problem     DEPRESSION, ANXIETY       Past Surgical History:  Past Surgical History:   Procedure Laterality Date    HX OTHER SURGICAL      HERNIA REPAIR (\"AS A CHILD\")    HX SEPTOPLASTY  06/10/2019    [facility]  DELIVERY ONLY         Family History:  Family History   Problem Relation Age of Onset    Diabetes Mother     Hypertension Mother     Diabetes Maternal Grandmother     Hypertension Maternal Grandmother     Diabetes Maternal Grandfather     Hypertension Maternal Grandfather     OSTEOARTHRITIS Father        Social History:  Social History     Tobacco Use    Smoking status: Passive Smoke Exposure - Never Smoker    Smokeless tobacco: Never    Tobacco comments:      smokes but not in the house   Substance Use Topics    Alcohol use: Yes     Comment: social drinker    Drug use: No       Allergies: Allergies   Allergen Reactions    Azithromycin Swelling     z pack         Review of Systems   Review of Systems   Constitutional:  Negative for activity change, chills and fever. HENT:  Positive for ear pain. Negative for congestion and sore throat. Eyes:  Negative for pain and redness.    Respiratory:  Negative for cough, chest tightness and shortness of breath. Cardiovascular:  Negative for chest pain and palpitations. Gastrointestinal:  Negative for abdominal pain, diarrhea, nausea and vomiting. Genitourinary:  Negative for dysuria, frequency and urgency. Musculoskeletal:  Negative for back pain and neck pain. Skin:  Positive for rash. Neurological:  Negative for syncope, light-headedness and headaches. Psychiatric/Behavioral:  Negative for confusion. All other systems reviewed and are negative. Physical Exam   Physical Exam  Constitutional:       General: She is not in acute distress. Appearance: She is well-developed. She is not diaphoretic. HENT:      Head: Normocephalic and atraumatic. Right Ear: Tympanic membrane normal.      Left Ear: Tympanic membrane normal.      Ears:      Comments: There is mild canal edema bilaterally. No drainage. Eyes:      General: No scleral icterus. Conjunctiva/sclera: Conjunctivae normal.      Pupils: Pupils are equal, round, and reactive to light. Pulmonary:      Effort: Pulmonary effort is normal.   Musculoskeletal:         General: Normal range of motion. Skin:     General: Skin is warm and dry. Findings: No rash. Comments: Small crop of 3-4 vesicular lesions with purulent fluid on midline low back in her upper lumbar area. Entire area is a 3 cm diameter. There is one denuded blister. Neurological:      Mental Status: She is alert and oriented to person, place, and time. Psychiatric:         Behavior: Behavior normal.           Diagnostic Study Results     Labs -   No results found for this or any previous visit (from the past 12 hour(s)). Radiologic Studies -   No orders to display     CT Results  (Last 48 hours)      None          CXR Results  (Last 48 hours)      None              Medical Decision Making   I am the first provider for this patient.     I reviewed the vital signs, available nursing notes, past medical history, past surgical history, family history and social history. Vital Signs-Reviewed the patient's vital signs. Patient Vitals for the past 12 hrs:   Temp Pulse Resp SpO2   07/31/22 1429 98.6 °F (37 °C) 96 16 100 %           Records Reviewed: Nursing notes reviewed    Provider Notes (Medical Decision Making):   DDX: 49-year-old female with blistering lesion on upper portion of low back. Suspect mild cellulitis. Area is midline and unlikely shingles especially with purulent filled vesicles. Ear exam reveals a very mild otitis externa. Will discharge with clindamycin, topical mupirocin and otic Cortisporin. ED Course:   Initial assessment performed. The patients presenting problems have been discussed, and they are in agreement with the care plan formulated and outlined with them. I have encouraged them to ask questions as they arise throughout their visit. Progress note:    Pt noted to be feeling better and ready for discharge. Updated pt and/or family on all final lab and/or  imaging findings. Will follow up as instructed. All questions have been answered, pt voiced understanding and agreement with plan. Abx were prescribed, pt advised that diarrhea and rash are possible side effects of the medications. Specific return precautions provided as well as instructions to return to the ED should sx worsen at any time. Vital signs stable for discharge. I have also put together some discharge instructions for them that include: 1) educational information regarding their diagnosis, 2) how to care for their diagnosis at home, as well a 3) list of reasons why they would want to return to the ED prior to their follow-up appointment, should their condition change. Written by Geovany Hill MD        Critical Care Time:   0    Disposition:  Discharge    PLAN:  1.    Current Discharge Medication List        START taking these medications    Details   mupirocin (BACTROBAN) 2 % ointment Apply  to affected area daily. Qty: 22 g, Refills: 0  Start date: 7/31/2022      neomycin-polymyxin-hydrocortisone, buffered, (PEDIOTIC) 3.5-10,000-1 mg/mL-unit/mL-% otic suspension Administer 3 Drops into each ear four (4) times daily for 7 days. Qty: 10 mL, Refills: 0  Start date: 7/31/2022, End date: 8/7/2022      clindamycin (CLEOCIN) 300 mg capsule Take 1 Capsule by mouth three (3) times daily for 7 days. Qty: 21 Capsule, Refills: 0  Start date: 7/31/2022, End date: 8/7/2022           2. Follow-up Information    None       Return to ED if worse     Diagnosis     Clinical Impression:   1. Cellulitis of lower back    2. Acute otitis externa of both ears, unspecified type              Please note that this dictation was completed with [employer], the computer voice recognition software. Quite often unanticipated grammatical, syntax, homophones, and other interpretive errors are inadvertently transcribed by the computer software. Please disregard these errors. Please excuse any errors that have escaped final proofreading.

## 2022-09-30 ENCOUNTER — VIRTUAL VISIT (OUTPATIENT)
Dept: FAMILY MEDICINE CLINIC | Age: 42
End: 2022-09-30
Payer: COMMERCIAL

## 2022-09-30 DIAGNOSIS — H69.92 EUSTACHIAN TUBE DISORDER, LEFT: ICD-10-CM

## 2022-09-30 DIAGNOSIS — J30.9 ALLERGIC RHINITIS, UNSPECIFIED SEASONALITY, UNSPECIFIED TRIGGER: ICD-10-CM

## 2022-09-30 DIAGNOSIS — M54.31 RIGHT SIDED SCIATICA: Primary | ICD-10-CM

## 2022-09-30 PROCEDURE — 99214 OFFICE O/P EST MOD 30 MIN: CPT | Performed by: NURSE PRACTITIONER

## 2022-09-30 RX ORDER — ETODOLAC 400 MG/1
400 TABLET, FILM COATED ORAL
Qty: 60 TABLET | Refills: 0 | Status: SHIPPED | OUTPATIENT
Start: 2022-09-30

## 2022-09-30 RX ORDER — LEVOCETIRIZINE DIHYDROCHLORIDE 5 MG/1
5 TABLET, FILM COATED ORAL DAILY
Qty: 30 TABLET | Refills: 1 | Status: SHIPPED | OUTPATIENT
Start: 2022-09-30

## 2022-09-30 NOTE — LETTER
NOTIFICATION RETURN TO WORK / SCHOOL    9/30/2022 12:28 PM    Ms. [address] [facility] [address] [id number]      To Whom It May Concern:    Varun Bowie is currently under the care of Emilie Byrnes. She will return to work/school on: 10/3/22    If there are questions or concerns please have the patient contact our office.         Sincerely,      Jerilyn Cruz NP

## 2022-09-30 NOTE — PROGRESS NOTES
Nevaeh Iverson, was evaluated through a synchronous (real-time) audio-video encounter. The patient (or guardian if applicable) is aware that this is a billable service, which includes applicable co-pays. This Virtual Visit was conducted with patient's (and/or legal guardian's) consent. The visit was conducted pursuant to the emergency declaration under the [address] [place] [address] and the [employer] Act. Patient identification was verified, and a caregiver was present when appropriate. The patient was located at: Home: [address] [facility] [id number]  The provider was located at: Facility (Appt Department): [id number] [facility] [phone]    Critical access hospital  Subjective:   HPI: Nevaeh Iverson was seen for Sinus Infection    Za Viera was seen virtually today with two concerns. Firstly, she reports R posterior buttock and hip pain that radiates down the back of her R leg to her R knee. This pain has come and gone since September 2020. No known injury. She had muscle relaxers but they made her tired so she can't take them at work. She continues to work at [employer]. She also reports fluid in her L ear for the past several months. She has been seen in our office and in the ER for this concern. No relief with nasal steroids, PO steroids, antihistamines, Mucinex, decongestants, antibiotics. Allergies   Allergen Reactions    Azithromycin Swelling     z pack       Current Outpatient Medications   Medication Sig    etodolac (LODINE) 400 mg tablet Take 1 Tablet by mouth two (2) times daily as needed for Pain.    levocetirizine (XYZAL) 5 mg tablet Take 1 Tablet by mouth daily.     fluticasone propionate (FLONASE) 50 mcg/actuation nasal spray SHAKE LIQUID AND USE 2 SPRAYS IN EACH NOSTRIL EVERY DAY    montelukast (SINGULAIR) 10 mg tablet TAKE 1 TABLET BY MOUTH DAILY    valACYclovir (VALTREX) 1 gram tablet 2 tablets initially then 2 tablets q 12 hours as needed for lesion  Indications: a cold sore    buPROPion XL (WELLBUTRIN XL) 150 mg tablet TAKE 1 TABLET BY MOUTH DAILY    ipratropium (ATROVENT) 42 mcg (0.06 %) nasal spray 2 Sprays by Both Nostrils route four (4) times daily. acetaminophen (TYLENOL) 500 mg tablet Take  by mouth every six (6) hours as needed for Pain.    levonorgestreL (MIRENA) 20 mcg/24 hours (7 yrs) 52 mg IUD 1 Each by IntraUTERine route once. No current facility-administered medications for this visit. Past Medical History:   Diagnosis Date    Anemia NEC     IRON DEF ANEMIA    Depression complicating pregnancy, antepartum 9/12/2012    H/O cold sores     HX OTHER MEDICAL     BELLS PALSY 05/2010, CARPEL TUNNEL RIGHT WRIST    IUGR (intrauterine growth restriction) 9/12/2012    Oligohydramnios antepartum 9/12/2012    Psychiatric problem     DEPRESSION, ANXIETY       Family History   Problem Relation Age of Onset    Diabetes Mother     Hypertension Mother     Diabetes Maternal Grandmother     Hypertension Maternal Grandmother     Diabetes Maternal Grandfather     Hypertension Maternal Grandfather     OSTEOARTHRITIS Father        ROS:  Denies fever, chills, cough, chest pain, SOB,  nausea, vomiting, diarrhea, dysuria. Denies rashes, wounds, + arthralgias, weakness, numbness, visual changes, depression. Denies wt loss, wt gain, hemoptysis, hematochezia or melena. Patient is not experiencing chest pain radiating to the jaw and/or down the arms. PHYSICAL EXAMINATION:    Vital Signs: (As obtained by patient/caregiver at home)  There were no vitals taken for this visit.      Constitutional: [x] Appears well-developed and well-nourished [x] No apparent distress      [] Abnormal -     Mental status: [x] Alert and awake  [x] Oriented to person/place/time [x] Able to follow commands    [] Abnormal -     Eyes:   EOM    [x]  Normal    [] Abnormal -   Sclera  [x]  Normal    [] Abnormal -          Discharge [x]  None visible   [] Abnormal -     HENT: [x] Normocephalic, atraumatic  [] Abnormal -   [x] Mouth/Throat: Mucous membranes are moist    External Ears [x] Normal  [] Abnormal -    Neck: [x] No visualized mass [] Abnormal -     Pulmonary/Chest: [x] Respiratory effort normal   [x] No visualized signs of difficulty breathing or respiratory distress        [] Abnormal -      Musculoskeletal:   [x] Normal gait with no signs of ataxia         [x] Normal range of motion of neck        [] Abnormal -     Neurological:        [x] No Facial Asymmetry (Cranial nerve 7 motor function) (limited exam due to video visit)          [x] No gaze palsy        [] Abnormal -          Skin:        [x] No significant exanthematous lesions or discoloration noted on facial skin         [] Abnormal -            Psychiatric:       [x] Normal Affect [] Abnormal -        [x] No Hallucinations    Other pertinent observable physical exam findings:-        Assessment & Plan:       ICD-10-CM ICD-9-CM    1. Right sided sciatica  M54.31 724.3 etodolac (LODINE) 400 mg tablet      2. Allergic rhinitis, unspecified seasonality, unspecified trigger  J30.9 477.9 levocetirizine (XYZAL) 5 mg tablet      3. Eustachian tube disorder, left  H69.92 381.9 levocetirizine (XYZAL) 5 mg tablet        Tx as above. Time to refer to ENT. We discussed the expected course, resolution and complications of the diagnosis(es) in detail. Medication risks, benefits, costs, interactions, and alternatives were discussed as indicated. I advised her to contact the office if her condition worsens, changes or fails to improve as anticipated. She expressed understanding with the diagnosis(es) and plan. RTC PRN.     Genesis Garza NP

## 2022-09-30 NOTE — PROGRESS NOTES
Chief Complaint   Patient presents with    Sinus Infection     1. Have you been to the ER, urgent care clinic since your last visit? Hospitalized since your last visit? No    2. Have you seen or consulted any other health care providers outside of the [address] [place] [address] since your last visit? Include any pap smears or colon screening. No  Fall Risk Assessment, last 12 mths 9/30/2022   Able to walk? Yes   Fall in past 12 months? 0   Do you feel unsteady? 0   Are you worried about falling 0     Abuse Screening Questionnaire 9/30/2022   Do you ever feel afraid of your partner? N   Are you in a relationship with someone who physically or mentally threatens you? N   Is it safe for you to go home? Y     3 most recent PHQ Screens 7/20/2022   Little interest or pleasure in doing things Not at all   Feeling down, depressed, irritable, or hopeless Not at all   Total Score PHQ 2 0   Trouble falling or staying asleep, or sleeping too much -   Feeling tired or having little energy -   Poor appetite, weight loss, or overeating -   Feeling bad about yourself - or that you are a failure or have let yourself or your family down -   Trouble concentrating on things such as school, work, reading, or watching TV -   Moving or speaking so slowly that other people could have noticed; or the opposite being so fidgety that others notice -   Thoughts of being better off dead, or hurting yourself in some way -   PHQ 9 Score -     Abuse Screening Questionnaire 9/30/2022   Do you ever feel afraid of your partner? N   Are you in a relationship with someone who physically or mentally threatens you? N   Is it safe for you to go home?  Zbigniew Rutherford

## 2022-12-01 ENCOUNTER — HOSPITAL ENCOUNTER (EMERGENCY)
Age: 42
Discharge: HOME | End: 2022-12-01
Attending: EMERGENCY MEDICINE
Payer: COMMERCIAL

## 2022-12-01 VITALS
SYSTOLIC BLOOD PRESSURE: 152 MMHG | HEART RATE: 95 BPM | HEIGHT: 66 IN | OXYGEN SATURATION: 100 % | BODY MASS INDEX: 27.32 KG/M2 | DIASTOLIC BLOOD PRESSURE: 95 MMHG | WEIGHT: 170 LBS | TEMPERATURE: 97.6 F | RESPIRATION RATE: 18 BRPM

## 2022-12-01 DIAGNOSIS — J10.1 INFLUENZA A: ICD-10-CM

## 2022-12-01 DIAGNOSIS — J01.90 ACUTE NON-RECURRENT SINUSITIS, UNSPECIFIED LOCATION: Primary | ICD-10-CM

## 2022-12-01 LAB
DEPRECATED S PYO AG THROAT QL EIA: NEGATIVE
FLUAV AG NPH QL IA: POSITIVE
FLUBV AG NOSE QL IA: NEGATIVE

## 2022-12-01 PROCEDURE — 87804 INFLUENZA ASSAY W/OPTIC: CPT

## 2022-12-01 PROCEDURE — 99283 EMERGENCY DEPT VISIT LOW MDM: CPT | Performed by: EMERGENCY MEDICINE

## 2022-12-01 PROCEDURE — 87070 CULTURE OTHR SPECIMN AEROBIC: CPT

## 2022-12-01 PROCEDURE — 87880 STREP A ASSAY W/OPTIC: CPT

## 2022-12-01 RX ORDER — AMOXICILLIN 875 MG/1
875 TABLET, FILM COATED ORAL 2 TIMES DAILY
Qty: 20 TABLET | Refills: 0 | Status: SHIPPED | OUTPATIENT
Start: 2022-12-01 | End: 2022-12-11

## 2022-12-01 NOTE — ED PROVIDER NOTES
EMERGENCY DEPARTMENT HISTORY AND PHYSICAL EXAM      Date: 12/1/2022  Patient Name: Claudio Fox    History of Presenting Illness     Chief Complaint   Patient presents with    Ear Pain    Nasal Congestion       History Provided By: Patient    HPI: Claudio Fox, 35 y.o. female with past medical history listed below, presents via private vehicle to the ED with cc of sinus congestion and ear pain. Patient reports sinus congestion and bilateral ear pain and sinus pressure for the past week. She has a minimal sore throat. She reports aching in her bilateral ears. No fevers or chills. Has had yellow and green nasal drainage. No neck stiffness. No rash. Son recently diagnosed with strep throat. She Thinks she has a sinus infection. There are no other complaints, changes, or physical findings at this time. PCP: Fátima Johnson NP    No current facility-administered medications on file prior to encounter. Current Outpatient Medications on File Prior to Encounter   Medication Sig Dispense Refill    etodolac (LODINE) 400 mg tablet Take 1 Tablet by mouth two (2) times daily as needed for Pain. 60 Tablet 0    levocetirizine (XYZAL) 5 mg tablet Take 1 Tablet by mouth daily. 30 Tablet 1    fluticasone propionate (FLONASE) 50 mcg/actuation nasal spray SHAKE LIQUID AND USE 2 SPRAYS IN EACH NOSTRIL EVERY DAY 16 g 5    montelukast (SINGULAIR) 10 mg tablet TAKE 1 TABLET BY MOUTH DAILY 90 Tablet 2    valACYclovir (VALTREX) 1 gram tablet 2 tablets initially then 2 tablets q 12 hours as needed for lesion  Indications: a cold sore 24 Tablet 4    buPROPion XL (WELLBUTRIN XL) 150 mg tablet TAKE 1 TABLET BY MOUTH DAILY 30 Tablet 5    ipratropium (ATROVENT) 42 mcg (0.06 %) nasal spray 2 Sprays by Both Nostrils route four (4) times daily.  15 mL 1    acetaminophen (TYLENOL) 500 mg tablet Take  by mouth every six (6) hours as needed for Pain.      levonorgestreL (MIRENA) 20 mcg/24 hours (7 yrs) 52 mg IUD 1 Each by IntraUTERine route once. Past History     Past Medical History:  Past Medical History:   Diagnosis Date    Anemia NEC     IRON DEF ANEMIA    Depression complicating pregnancy, antepartum 2012    H/O cold sores     HX OTHER MEDICAL     BELLS PALSY 2010, CARPEL TUNNEL RIGHT WRIST    IUGR (intrauterine growth restriction) 2012    Oligohydramnios antepartum 2012    Psychiatric problem     DEPRESSION, ANXIETY       Past Surgical History:  Past Surgical History:   Procedure Laterality Date    HX OTHER SURGICAL      HERNIA REPAIR (\"AS A CHILD\")    HX SEPTOPLASTY  06/10/2019    [place]  DELIVERY ONLY         Family History:  Family History   Problem Relation Age of Onset    Diabetes Mother     Hypertension Mother     Diabetes Maternal Grandmother     Hypertension Maternal Grandmother     Diabetes Maternal Grandfather     Hypertension Maternal Grandfather     OSTEOARTHRITIS Father        Social History:  Social History     Tobacco Use    Smoking status: Passive Smoke Exposure - Never Smoker    Smokeless tobacco: Never    Tobacco comments:      smokes but not in the house   Substance Use Topics    Alcohol use: Yes     Comment: social drinker    Drug use: No       Allergies: Allergies   Allergen Reactions    Azithromycin Swelling     z pack         Review of Systems   Review of Systems   Constitutional:  Negative for activity change, chills and fever. HENT:  Positive for congestion, ear pain, postnasal drip, rhinorrhea, sinus pressure, sinus pain and sneezing. Negative for ear discharge, facial swelling, hearing loss, mouth sores, nosebleeds, trouble swallowing and voice change. Eyes:  Negative for pain and redness. Respiratory:  Negative for cough, chest tightness and shortness of breath. Cardiovascular:  Negative for chest pain and palpitations. Gastrointestinal:  Negative for abdominal pain, diarrhea, nausea and vomiting.    Genitourinary:  Negative for dysuria, frequency and urgency. Musculoskeletal:  Negative for back pain and neck pain. Skin:  Negative for rash. Neurological:  Negative for syncope, light-headedness and headaches. Psychiatric/Behavioral:  Negative for confusion. All other systems reviewed and are negative. Physical Exam     Physical Exam  Constitutional:       General: She is not in acute distress. Appearance: She is well-developed. She is not diaphoretic. HENT:      Head: Normocephalic and atraumatic. Right Ear: Tympanic membrane and ear canal normal.      Left Ear: Tympanic membrane and ear canal normal.   Eyes:      General: No scleral icterus. Conjunctiva/sclera: Conjunctivae normal.      Pupils: Pupils are equal, round, and reactive to light. Cardiovascular:      Rate and Rhythm: Normal rate and regular rhythm. Heart sounds: Normal heart sounds. Pulmonary:      Effort: Pulmonary effort is normal.   Musculoskeletal:         General: Normal range of motion. Skin:     General: Skin is warm and dry. Findings: No rash. Neurological:      Mental Status: She is alert and oriented to person, place, and time. Psychiatric:         Behavior: Behavior normal.             Diagnostic Study Results     Labs -     Recent Results (from the past 12 hour(s))   INFLUENZA A+B VIRAL AGS    Collection Time: 12/01/22 11:45 AM   Result Value Ref Range    Influenza A Antigen Positive (A) NEG      Influenza B Antigen Negative NEG     STREP AG SCREEN, GROUP A    Collection Time: 12/01/22 11:48 AM    Specimen: Swab   Result Value Ref Range    Group A Strep Ag ID Negative NEG         Radiologic Studies -   No orders to display     CT Results  (Last 48 hours)      None          CXR Results  (Last 48 hours)      None              Medical Decision Making   I am the first provider for this patient.     I reviewed the vital signs, available nursing notes, past medical history, past surgical history, family history and social history. Vital Signs-Reviewed the patient's vital signs. Patient Vitals for the past 12 hrs:   Temp Pulse Resp BP SpO2   12/01/22 1133 97.6 °F (36.4 °C) 95 18 (!) 152/95 100 %           Records Reviewed: Nursing notes reviewed    Provider Notes (Medical Decision Making):   DDX: Sinusitis, influenza, strep pharyngitis, viral URI    ED Course:   Initial assessment performed. The patients presenting problems have been discussed, and they are in agreement with the care plan formulated and outlined with them. I have encouraged them to ask questions as they arise throughout their visit. PROGRESS NOTE  Pt influenza A positive. Strep negative. Symptoms x7 days with worsening sinus issues. We will treat with amoxicillin. Patient afebrile nontoxic. 100% on room air. Written by Romana Bombard, MD     Progress note:    Pt noted to be feeling better and ready for discharge. Updated pt and/or family on all final lab and/or  imaging findings. Will follow up as instructed. All questions have been answered, pt voiced understanding and agreement with plan. Abx were prescribed, pt advised that diarrhea and rash are possible side effects of the medications. Specific return precautions provided as well as instructions to return to the ED should sx worsen at any time. Vital signs stable for discharge. I have also put together some discharge instructions for them that include: 1) educational information regarding their diagnosis, 2) how to care for their diagnosis at home, as well a 3) list of reasons why they would want to return to the ED prior to their follow-up appointment, should their condition change. Written by Romana Bombard, MD        Critical Care Time:   0    Disposition:  Discharge    PLAN:  1. Current Discharge Medication List        START taking these medications    Details   amoxicillin (AMOXIL) 875 mg tablet Take 1 Tablet by mouth two (2) times a day for 10 days.   Qty: 20 Tablet, Refills: 0  Start date: 12/1/2022, End date: 12/11/2022           2. Follow-up Information       Follow up With Specialties Details Why Contact Info    Christopher Richter NP Nurse Practitioner Schedule an appointment as soon as possible for a visit in 1 week  [address] [place] [address] Drive  [phone]      Christopher Richter NP Nurse Practitioner Schedule an appointment as soon as possible for a visit in 1 week  [facility] 170  2776 [facility] [address] Emergency Medicine Go in 1 day If symptoms worsen 1175 [facility] [address] [phone]          Return to ED if worse     Diagnosis     Clinical Impression:   1. Acute non-recurrent sinusitis, unspecified location    2. Influenza A              Please note that this dictation was completed with [employer], the computer voice recognition software. Quite often unanticipated grammatical, syntax, homophones, and other interpretive errors are inadvertently transcribed by the computer software. Please disregard these errors. Please excuse any errors that have escaped final proofreading.

## 2022-12-01 NOTE — Clinical Note
[address] [place] [address] EMERGENCY DEP  [address] [facility] [address] [place] [address]  [phone]    Work/School Note    Date: 12/1/2022    To Whom It May concern:    Geoffrey Schumacher was seen and treated today in the emergency room by the following provider(s):  Attending Provider: Miguelina Walker MD.      Geoffrey Schumacher is excused from work/school on 12/1/2022 through 12/3/2022. She is medically clear to return to work/school on 12/4/2022.          Sincerely,          Kt Rodriguez MD

## 2022-12-02 LAB
BACTERIA SPEC CULT: NORMAL
SERVICE CMNT-IMP: NORMAL

## 2022-12-06 LAB
BACTERIA SPEC CULT: NORMAL
SERVICE CMNT-IMP: NORMAL

## 2022-12-09 ENCOUNTER — TELEPHONE (OUTPATIENT)
Dept: FAMILY MEDICINE CLINIC | Age: 42
End: 2022-12-09

## 2022-12-09 NOTE — TELEPHONE ENCOUNTER
Claudia Sagastume is on bupropion 150 mg for depression. She is wondering if Joya Bravo would be able to put her on something for anxiety as well. Preferably something that wouldn't lead to weight gain. Please advise.

## 2022-12-12 ENCOUNTER — VIRTUAL VISIT (OUTPATIENT)
Dept: FAMILY MEDICINE CLINIC | Age: 42
End: 2022-12-12
Payer: COMMERCIAL

## 2022-12-12 DIAGNOSIS — F43.9 STRESS: Primary | ICD-10-CM

## 2022-12-12 DIAGNOSIS — F33.9 RECURRENT DEPRESSION (HCC): ICD-10-CM

## 2022-12-12 PROCEDURE — 99442 PR PHYS/QHP TELEPHONE EVALUATION 11-20 MIN: CPT | Performed by: NURSE PRACTITIONER

## 2022-12-12 RX ORDER — BUPROPION HYDROCHLORIDE 150 MG/1
300 TABLET ORAL DAILY
Qty: 30 TABLET | Refills: 5
Start: 2022-12-12

## 2022-12-12 NOTE — PROGRESS NOTES
Dennis Toledo is a 35 y.o. female evaluated via telephone on 12/12/2022. Consent:    Dennis Toledo was evaluated through a patient-initiated, synchronous (real-time) audio only encounter. She (or guardian if applicable) is aware that it is a billable service, which includes applicable co-pays, with coverage as determined by her insurance carrier. This visit was conducted with the patient's (and/or Madalyn Birch guardian's) verbal consent. She has not had a related appointment within my department in the past 7 days or scheduled within the next 24 hours. The patient was located in a state where the provider was licensed to provide care. The patient was located at: Home: [address] [place] [address]  The provider was located at: Facility [facility] [address] [place] [phone]      Documentation:  I communicated with the patient and/or health care decision maker about anxiety. Malathi Roque reports a 1 month history of feeling stressed, overwhelmed, anxious. She has difficulty focusing at work. She is worried about weight gain and doesn't want any medications that could cause weight gain. Details of this discussion including any medical advice provided: We discussed tx options including increasing her Wellbutrin, adding an SSRI or SNRI, or a PRN option like Atarax. We discussed risks and benefits of these options. I recommended increasing Wellbutrin to 300 mg daily as a starting point. She agrees. I affirm this is a Patient Initiated Episode with an Established Patient who has not had a related appointment within my department in the past 7 days or scheduled within the next 24 hours. ASSESSMENT AND PLAN:       ICD-10-CM ICD-9-CM    1. Stress  F43.9 V62.89       2. Recurrent depression (HCC)  F33.9 296.30 buPROPion XL (WELLBUTRIN XL) 150 mg tablet          Total Time: minutes: 11-20 minutes    Patient aware of plan of care and verbalized understanding.  Questions answered. RTC 3-4 weeks to follow up on med changes, or sooner if needed.     Chintan Baker NP

## 2023-06-02 ENCOUNTER — NURSE TRIAGE (OUTPATIENT)
Dept: OTHER | Facility: CLINIC | Age: 43
End: 2023-06-02

## 2023-06-02 RX ORDER — AMITRIPTYLINE HYDROCHLORIDE 75 MG/1
75 TABLET, FILM COATED ORAL NIGHTLY
Qty: 30 TABLET | Refills: 5 | Status: SHIPPED | OUTPATIENT
Start: 2023-06-02

## 2023-06-02 RX ORDER — ETODOLAC 400 MG/1
400 TABLET, FILM COATED ORAL 2 TIMES DAILY PRN
Qty: 60 TABLET | Refills: 0 | Status: SHIPPED | OUTPATIENT
Start: 2023-06-02

## 2023-06-02 NOTE — TELEPHONE ENCOUNTER
Location of patient: [address] [facility] [employer]. Subjective: Caller states \"I am fatigued\"     Current Symptoms: Stopped the Amitriptyline due to it making her more tired. She then started the Wellbutrin and that gave her energy but caused her headaches. She is back on her Amitriptyline again. Has 9-10 left so needs a refill. Still very tired. Sciatic pain and takes Etodolac and needs a refill of those also. Onset: 2-3 years ago; worsening, waxing and waning    Associated Symptoms: reduced activity, increased sleepiness    Temperature: denies fever     LMP: NA Pregnant: NA    Recommended disposition: Go to Office Now    Care advice provided, patient verbalizes understanding; denies any other questions or concerns; instructed to call back for any new or worsening symptoms. Writer provided warm transfer to [place] [facility] for scheduling  and to make sure patients prescriptions get refilled for her. Attention Provider: Thank you for allowing me to participate in the care of your patient. The patient was connected to triage in response to information provided to the ECC/PSC. Please do not respond through this encounter as the response is not directed to a shared pool.     Reason for Disposition   MODERATE weakness (i.e., interferes with work, school, normal activities) and cause unknown  (Exceptions: Weakness with acute minor illness, or weakness from poor fluid intake.)    Protocols used: Weakness (Generalized) and Fatigue-ADULT-OH

## 2023-06-06 ENCOUNTER — OFFICE VISIT (OUTPATIENT)
Age: 43
End: 2023-06-06
Payer: COMMERCIAL

## 2023-06-06 VITALS
HEART RATE: 99 BPM | DIASTOLIC BLOOD PRESSURE: 88 MMHG | BODY MASS INDEX: 29.54 KG/M2 | WEIGHT: 183.8 LBS | HEIGHT: 66 IN | SYSTOLIC BLOOD PRESSURE: 134 MMHG | RESPIRATION RATE: 19 BRPM | OXYGEN SATURATION: 96 %

## 2023-06-06 DIAGNOSIS — R53.82 CHRONIC FATIGUE: Primary | ICD-10-CM

## 2023-06-06 DIAGNOSIS — Z11.59 ENCOUNTER FOR HEPATITIS C SCREENING TEST FOR LOW RISK PATIENT: ICD-10-CM

## 2023-06-06 DIAGNOSIS — Z13.220 SCREENING, LIPID: ICD-10-CM

## 2023-06-06 DIAGNOSIS — E55.9 VITAMIN D DEFICIENCY: ICD-10-CM

## 2023-06-06 DIAGNOSIS — F33.0 MILD EPISODE OF RECURRENT MAJOR DEPRESSIVE DISORDER (HCC): ICD-10-CM

## 2023-06-06 DIAGNOSIS — M54.41 CHRONIC RIGHT-SIDED LOW BACK PAIN WITH RIGHT-SIDED SCIATICA: ICD-10-CM

## 2023-06-06 DIAGNOSIS — G89.29 CHRONIC RIGHT-SIDED LOW BACK PAIN WITH RIGHT-SIDED SCIATICA: ICD-10-CM

## 2023-06-06 PROCEDURE — 99214 OFFICE O/P EST MOD 30 MIN: CPT

## 2023-06-06 RX ORDER — BUPROPION HYDROCHLORIDE 150 MG/1
150 TABLET ORAL EVERY MORNING
Qty: 30 TABLET | Refills: 0 | Status: SHIPPED | OUTPATIENT
Start: 2023-06-06

## 2023-06-06 SDOH — ECONOMIC STABILITY: HOUSING INSECURITY
IN THE LAST 12 MONTHS, WAS THERE A TIME WHEN YOU DID NOT HAVE A STEADY PLACE TO SLEEP OR SLEPT IN A SHELTER (INCLUDING NOW)?: NO

## 2023-06-06 SDOH — ECONOMIC STABILITY: FOOD INSECURITY: WITHIN THE PAST 12 MONTHS, YOU WORRIED THAT YOUR FOOD WOULD RUN OUT BEFORE YOU GOT MONEY TO BUY MORE.: NEVER TRUE

## 2023-06-06 SDOH — ECONOMIC STABILITY: TRANSPORTATION INSECURITY
IN THE PAST 12 MONTHS, HAS THE LACK OF TRANSPORTATION KEPT YOU FROM MEDICAL APPOINTMENTS OR FROM GETTING MEDICATIONS?: NO

## 2023-06-06 SDOH — ECONOMIC STABILITY: TRANSPORTATION INSECURITY
IN THE PAST 12 MONTHS, HAS LACK OF TRANSPORTATION KEPT YOU FROM MEETINGS, WORK, OR FROM GETTING THINGS NEEDED FOR DAILY LIVING?: NO

## 2023-06-06 SDOH — HEALTH STABILITY: PHYSICAL HEALTH: ON AVERAGE, HOW MANY MINUTES DO YOU ENGAGE IN EXERCISE AT THIS LEVEL?: 0 MIN

## 2023-06-06 SDOH — HEALTH STABILITY: PHYSICAL HEALTH: ON AVERAGE, HOW MANY DAYS PER WEEK DO YOU ENGAGE IN MODERATE TO STRENUOUS EXERCISE (LIKE A BRISK WALK)?: 0 DAYS

## 2023-06-06 SDOH — ECONOMIC STABILITY: FOOD INSECURITY: WITHIN THE PAST 12 MONTHS, THE FOOD YOU BOUGHT JUST DIDN'T LAST AND YOU DIDN'T HAVE MONEY TO GET MORE.: NEVER TRUE

## 2023-06-06 SDOH — ECONOMIC STABILITY: INCOME INSECURITY: IN THE LAST 12 MONTHS, WAS THERE A TIME WHEN YOU WERE NOT ABLE TO PAY THE MORTGAGE OR RENT ON TIME?: NO

## 2023-06-06 ASSESSMENT — SOCIAL DETERMINANTS OF HEALTH (SDOH)
DO YOU BELONG TO ANY CLUBS OR ORGANIZATIONS SUCH AS CHURCH GROUPS UNIONS, FRATERNAL OR ATHLETIC GROUPS, OR SCHOOL GROUPS?: NO
HOW OFTEN DO YOU ATTENT MEETINGS OF THE CLUB OR ORGANIZATION YOU BELONG TO?: NEVER
WITHIN THE LAST YEAR, HAVE YOU BEEN AFRAID OF YOUR PARTNER OR EX-PARTNER?: NO
WITHIN THE LAST YEAR, HAVE YOU BEEN KICKED, HIT, SLAPPED, OR OTHERWISE PHYSICALLY HURT BY YOUR PARTNER OR EX-PARTNER?: NO
WITHIN THE LAST YEAR, HAVE TO BEEN RAPED OR FORCED TO HAVE ANY KIND OF SEXUAL ACTIVITY BY YOUR PARTNER OR EX-PARTNER?: NO
HOW OFTEN DO YOU ATTEND CHURCH OR RELIGIOUS SERVICES?: NEVER
HOW OFTEN DO YOU GET TOGETHER WITH FRIENDS OR RELATIVES?: ONCE A WEEK
WITHIN THE LAST YEAR, HAVE YOU BEEN HUMILIATED OR EMOTIONALLY ABUSED IN OTHER WAYS BY YOUR PARTNER OR EX-PARTNER?: NO
IN A TYPICAL WEEK, HOW MANY TIMES DO YOU TALK ON THE PHONE WITH FAMILY, FRIENDS, OR NEIGHBORS?: TWICE A WEEK

## 2023-06-06 ASSESSMENT — ANXIETY QUESTIONNAIRES
4. TROUBLE RELAXING: 2
3. WORRYING TOO MUCH ABOUT DIFFERENT THINGS: 2
GAD7 TOTAL SCORE: 14
5. BEING SO RESTLESS THAT IT IS HARD TO SIT STILL: 0
6. BECOMING EASILY ANNOYED OR IRRITABLE: 3
7. FEELING AFRAID AS IF SOMETHING AWFUL MIGHT HAPPEN: 2
IF YOU CHECKED OFF ANY PROBLEMS ON THIS QUESTIONNAIRE, HOW DIFFICULT HAVE THESE PROBLEMS MADE IT FOR YOU TO DO YOUR WORK, TAKE CARE OF THINGS AT HOME, OR GET ALONG WITH OTHER PEOPLE: EXTREMELY DIFFICULT
1. FEELING NERVOUS, ANXIOUS, OR ON EDGE: 3
2. NOT BEING ABLE TO STOP OR CONTROL WORRYING: 2

## 2023-06-06 ASSESSMENT — PATIENT HEALTH QUESTIONNAIRE - PHQ9
6. FEELING BAD ABOUT YOURSELF - OR THAT YOU ARE A FAILURE OR HAVE LET YOURSELF OR YOUR FAMILY DOWN: 2
7. TROUBLE CONCENTRATING ON THINGS, SUCH AS READING THE NEWSPAPER OR WATCHING TELEVISION: 2
SUM OF ALL RESPONSES TO PHQ9 QUESTIONS 1 & 2: 6
1. LITTLE INTEREST OR PLEASURE IN DOING THINGS: 3
9. THOUGHTS THAT YOU WOULD BE BETTER OFF DEAD, OR OF HURTING YOURSELF: 0
SUM OF ALL RESPONSES TO PHQ QUESTIONS 1-9: 19
3. TROUBLE FALLING OR STAYING ASLEEP: 3
10. IF YOU CHECKED OFF ANY PROBLEMS, HOW DIFFICULT HAVE THESE PROBLEMS MADE IT FOR YOU TO DO YOUR WORK, TAKE CARE OF THINGS AT HOME, OR GET ALONG WITH OTHER PEOPLE: 3
2. FEELING DOWN, DEPRESSED OR HOPELESS: 3
5. POOR APPETITE OR OVEREATING: 3
8. MOVING OR SPEAKING SO SLOWLY THAT OTHER PEOPLE COULD HAVE NOTICED. OR THE OPPOSITE, BEING SO FIGETY OR RESTLESS THAT YOU HAVE BEEN MOVING AROUND A LOT MORE THAN USUAL: 0
SUM OF ALL RESPONSES TO PHQ QUESTIONS 1-9: 19
4. FEELING TIRED OR HAVING LITTLE ENERGY: 3

## 2023-06-06 ASSESSMENT — LIFESTYLE VARIABLES
HOW OFTEN DO YOU HAVE A DRINK CONTAINING ALCOHOL: NEVER
HOW MANY STANDARD DRINKS CONTAINING ALCOHOL DO YOU HAVE ON A TYPICAL DAY: PATIENT DOES NOT DRINK

## 2023-06-06 ASSESSMENT — ENCOUNTER SYMPTOMS: SHORTNESS OF BREATH: 0

## 2023-06-06 NOTE — PATIENT INSTRUCTIONS
Mental Health Providers  [facility] [place], [address], [address]  [facility]  FAITH, [address] [facility]  [address], [address]  [facility] [employer] [facility], [address]  [facility]  Kassandra Pedraza, [address] [facility]  *[place] [phone]  *[address] [facility] [phone] Main number  *Hay Patel [phone]  Chas Bang, [address] [facility], [address]  [facility] (mostly kids)   Porfirio Clemons, [facility] and [place] [address] [phone]  [facility] --[address] office [phone] (all ages)  [place], [place]   (limited hours Mon afternoon and some evenings)  [phone] (Ages 13 and above, mostly adults)  Yoan Flores, [address] office, limited hours  mostly kids [facility] office [phone]  Gemini Gee office [phone]  Aldo Yung office [phone] (Substance abuse specialty)   [address] [facility] office:  [phone] [address] Office: [phone]

## 2023-06-06 NOTE — PROGRESS NOTES
1. \"Have you been to the ER, urgent care clinic since your last visit? Hospitalized since your last visit? \" No    2. \"Have you seen or consulted any other health care providers outside of the [address] [place] [address] since your last visit? \" No     3. For patients aged 39-70: Has the patient had a colonoscopy / FIT/ Cologuard? NA - based on age     If the patient is female:    4. For patients aged 41-77: Has the patient had a mammogram within the past 2 years? NA - based on age    11. For patients aged 21-65: Has the patient had a pap smear? Yes - no Care Gap present    Chief Complaint   Patient presents with    Fatigue     years     Vitals:    06/06/23 0919   BP: 134/88   Pulse: 99   Resp: 19   SpO2: 96%     Labs drawn in left arm per Roseanna's orders. Patient tolerated well.
IUGR (intrauterine growth restriction) 9/12/2012    Oligohydramnios antepartum 9/12/2012    Psychiatric problem     DEPRESSION, ANXIETY       Family History   Problem Relation Age of Onset    Osteoarthritis Father     Hypertension Maternal Grandmother     Diabetes Mother     Diabetes Maternal Grandfather     Hypertension Maternal Grandfather     Hypertension Mother     Diabetes Maternal Grandmother        Review of Systems   Constitutional:  Positive for fatigue. Negative for appetite change, chills, fever and unexpected weight change. Respiratory:  Negative for shortness of breath. Cardiovascular:  Negative for chest pain and palpitations. Neurological:  Negative for dizziness and headaches. Psychiatric/Behavioral:  Positive for dysphoric mood and sleep disturbance. Negative for hallucinations and self-injury. The patient is nervous/anxious. Vitals:    06/06/23 0919   BP: 134/88   Pulse: 99   Resp: 19   SpO2: 96%       Wt Readings from Last 3 Encounters:   06/06/23 183 lb 12.8 oz (83.4 kg)   05/12/22 168 lb (76.2 kg)   02/23/22 184 lb 3.2 oz (83.6 kg)       PHQ-9  6/6/2023   Little interest or pleasure in doing things 3   Little interest or pleasure in doing things -   Feeling down, depressed, or hopeless 3   Trouble falling or staying asleep, or sleeping too much 3   Feeling tired or having little energy 3   Poor appetite or overeating 3   Feeling bad about yourself - or that you are a failure or have let yourself or your family down 2   Trouble concentrating on things, such as reading the newspaper or watching television 2   Moving or speaking so slowly that other people could have noticed.  Or the opposite - being so fidgety or restless that you have been moving around a lot more than usual 0   Thoughts that you would be better off dead, or of hurting yourself in some way 0   PHQ-2 Score 6   Total Score PHQ 2 -   PHQ-9 Total Score 19   If you checked off any problems, how difficult have these

## 2023-06-07 LAB
25(OH)D3 SERPL-MCNC: 19.9 NG/ML (ref 30–100)
ALBUMIN SERPL-MCNC: 3.6 G/DL (ref 3.5–5)
ALBUMIN/GLOB SERPL: 0.9 (ref 1.1–2.2)
ALP SERPL-CCNC: 124 U/L (ref 45–117)
ALT SERPL-CCNC: 25 U/L (ref 12–78)
ANION GAP SERPL CALC-SCNC: 6 MMOL/L (ref 5–15)
AST SERPL-CCNC: 18 U/L (ref 15–37)
BASOPHILS # BLD: 0.1 K/UL (ref 0–0.1)
BASOPHILS NFR BLD: 1 % (ref 0–1)
BILIRUB SERPL-MCNC: 0.3 MG/DL (ref 0.2–1)
BUN SERPL-MCNC: 11 MG/DL (ref 6–20)
BUN/CREAT SERPL: 10 (ref 12–20)
CALCIUM SERPL-MCNC: 9.7 MG/DL (ref 8.5–10.1)
CHLORIDE SERPL-SCNC: 104 MMOL/L (ref 97–108)
CHOLEST SERPL-MCNC: 155 MG/DL
CO2 SERPL-SCNC: 29 MMOL/L (ref 21–32)
CREAT SERPL-MCNC: 1.07 MG/DL (ref 0.55–1.02)
DIFFERENTIAL METHOD BLD: ABNORMAL
EOSINOPHIL # BLD: 0.8 K/UL (ref 0–0.4)
EOSINOPHIL NFR BLD: 11 % (ref 0–7)
ERYTHROCYTE [DISTWIDTH] IN BLOOD BY AUTOMATED COUNT: 15.5 % (ref 11.5–14.5)
GLOBULIN SER CALC-MCNC: 4 G/DL (ref 2–4)
GLUCOSE SERPL-MCNC: 111 MG/DL (ref 65–100)
HCT VFR BLD AUTO: 42.5 % (ref 35–47)
HCV AB SERPL QL IA: NONREACTIVE
HDLC SERPL-MCNC: 51 MG/DL
HDLC SERPL: 3 (ref 0–5)
HGB BLD-MCNC: 12.4 G/DL (ref 11.5–16)
IMM GRANULOCYTES # BLD AUTO: 0 K/UL (ref 0–0.04)
IMM GRANULOCYTES NFR BLD AUTO: 0 % (ref 0–0.5)
LDLC SERPL CALC-MCNC: 79.4 MG/DL (ref 0–100)
LYMPHOCYTES # BLD: 2.6 K/UL (ref 0.8–3.5)
LYMPHOCYTES NFR BLD: 35 % (ref 12–49)
MCH RBC QN AUTO: 24.6 PG (ref 26–34)
MCHC RBC AUTO-ENTMCNC: 29.2 G/DL (ref 30–36.5)
MCV RBC AUTO: 84.2 FL (ref 80–99)
MONOCYTES # BLD: 0.7 K/UL (ref 0–1)
MONOCYTES NFR BLD: 9 % (ref 5–13)
NEUTS SEG # BLD: 3.2 K/UL (ref 1.8–8)
NEUTS SEG NFR BLD: 44 % (ref 32–75)
NRBC # BLD: 0 K/UL (ref 0–0.01)
NRBC BLD-RTO: 0 PER 100 WBC
PLATELET # BLD AUTO: 374 K/UL (ref 150–400)
PMV BLD AUTO: 10.3 FL (ref 8.9–12.9)
POTASSIUM SERPL-SCNC: 4 MMOL/L (ref 3.5–5.1)
PROT SERPL-MCNC: 7.6 G/DL (ref 6.4–8.2)
RBC # BLD AUTO: 5.05 M/UL (ref 3.8–5.2)
SODIUM SERPL-SCNC: 139 MMOL/L (ref 136–145)
TRIGL SERPL-MCNC: 123 MG/DL
TSH SERPL DL<=0.05 MIU/L-ACNC: 1.92 UIU/ML (ref 0.36–3.74)
VLDLC SERPL CALC-MCNC: 24.6 MG/DL
WBC # BLD AUTO: 7.4 K/UL (ref 3.6–11)

## 2023-06-07 RX ORDER — ERGOCALCIFEROL 1.25 MG/1
50000 CAPSULE ORAL WEEKLY
Qty: 12 CAPSULE | Refills: 1 | Status: SHIPPED | OUTPATIENT
Start: 2023-06-07

## 2023-07-31 DIAGNOSIS — F33.0 MILD EPISODE OF RECURRENT MAJOR DEPRESSIVE DISORDER (HCC): ICD-10-CM

## 2023-07-31 RX ORDER — BUPROPION HYDROCHLORIDE 150 MG/1
150 TABLET ORAL EVERY MORNING
Qty: 30 TABLET | Refills: 5 | Status: SHIPPED | OUTPATIENT
Start: 2023-07-31

## 2023-09-05 RX ORDER — VALACYCLOVIR HYDROCHLORIDE 1 G/1
TABLET, FILM COATED ORAL
Qty: 24 TABLET | Refills: 0 | Status: SHIPPED | OUTPATIENT
Start: 2023-09-05

## 2023-10-20 ENCOUNTER — OFFICE VISIT (OUTPATIENT)
Age: 43
End: 2023-10-20
Payer: COMMERCIAL

## 2023-10-20 VITALS
TEMPERATURE: 98.2 F | OXYGEN SATURATION: 96 % | WEIGHT: 175.8 LBS | SYSTOLIC BLOOD PRESSURE: 120 MMHG | HEART RATE: 106 BPM | RESPIRATION RATE: 20 BRPM | DIASTOLIC BLOOD PRESSURE: 76 MMHG | HEIGHT: 66 IN | BODY MASS INDEX: 28.25 KG/M2

## 2023-10-20 DIAGNOSIS — H69.93 DYSFUNCTION OF BOTH EUSTACHIAN TUBES: Primary | ICD-10-CM

## 2023-10-20 PROCEDURE — 99214 OFFICE O/P EST MOD 30 MIN: CPT | Performed by: NURSE PRACTITIONER

## 2023-10-20 RX ORDER — TRIAMCINOLONE ACETONIDE 0.25 MG/G
CREAM TOPICAL
Qty: 15 G | Refills: 0 | Status: SHIPPED | OUTPATIENT
Start: 2023-10-20

## 2023-10-20 RX ORDER — IPRATROPIUM BROMIDE 42 UG/1
2 SPRAY, METERED NASAL 4 TIMES DAILY
Qty: 15 ML | Refills: 0 | Status: SHIPPED | OUTPATIENT
Start: 2023-10-20

## 2023-10-20 RX ORDER — PREDNISONE 20 MG/1
20 TABLET ORAL DAILY
Qty: 5 TABLET | Refills: 0 | Status: SHIPPED | OUTPATIENT
Start: 2023-10-20 | End: 2023-10-25

## 2023-10-20 SDOH — ECONOMIC STABILITY: INCOME INSECURITY: IN THE LAST 12 MONTHS, WAS THERE A TIME WHEN YOU WERE NOT ABLE TO PAY THE MORTGAGE OR RENT ON TIME?: NO

## 2023-10-20 ASSESSMENT — PATIENT HEALTH QUESTIONNAIRE - PHQ9
SUM OF ALL RESPONSES TO PHQ QUESTIONS 1-9: 0
10. IF YOU CHECKED OFF ANY PROBLEMS, HOW DIFFICULT HAVE THESE PROBLEMS MADE IT FOR YOU TO DO YOUR WORK, TAKE CARE OF THINGS AT HOME, OR GET ALONG WITH OTHER PEOPLE: 0
SUM OF ALL RESPONSES TO PHQ QUESTIONS 1-9: 0
1. LITTLE INTEREST OR PLEASURE IN DOING THINGS: 0
6. FEELING BAD ABOUT YOURSELF - OR THAT YOU ARE A FAILURE OR HAVE LET YOURSELF OR YOUR FAMILY DOWN: 0
4. FEELING TIRED OR HAVING LITTLE ENERGY: 0
SUM OF ALL RESPONSES TO PHQ QUESTIONS 1-9: 0
7. TROUBLE CONCENTRATING ON THINGS, SUCH AS READING THE NEWSPAPER OR WATCHING TELEVISION: 0
SUM OF ALL RESPONSES TO PHQ QUESTIONS 1-9: 0
9. THOUGHTS THAT YOU WOULD BE BETTER OFF DEAD, OR OF HURTING YOURSELF: 0
5. POOR APPETITE OR OVEREATING: 0
SUM OF ALL RESPONSES TO PHQ9 QUESTIONS 1 & 2: 0
8. MOVING OR SPEAKING SO SLOWLY THAT OTHER PEOPLE COULD HAVE NOTICED. OR THE OPPOSITE, BEING SO FIGETY OR RESTLESS THAT YOU HAVE BEEN MOVING AROUND A LOT MORE THAN USUAL: 0
3. TROUBLE FALLING OR STAYING ASLEEP: 0
2. FEELING DOWN, DEPRESSED OR HOPELESS: 0

## 2023-10-20 ASSESSMENT — COLUMBIA-SUICIDE SEVERITY RATING SCALE - C-SSRS
4. HAVE YOU HAD THESE THOUGHTS AND HAD SOME INTENTION OF ACTING ON THEM?: NO
3. HAVE YOU BEEN THINKING ABOUT HOW YOU MIGHT KILL YOURSELF?: NO
5. HAVE YOU STARTED TO WORK OUT OR WORKED OUT THE DETAILS OF HOW TO KILL YOURSELF? DO YOU INTEND TO CARRY OUT THIS PLAN?: NO

## 2023-10-20 NOTE — PROGRESS NOTES
Chief Complaint   Patient presents with    Facial Pain       ASSESSMENT AND PLAN:      Diagnosis Orders   1. Dysfunction of both eustachian tubes  ipratropium (ATROVENT) 0.06 % nasal spray    predniSONE (DELTASONE) 20 MG tablet    triamcinolone (KENALOG) 0.025 % cream        Ok to use ibuprofen, Tylenol as needed. No signs of infection at this time. Patient aware of plan of care and verbalized understanding. Questions answered. RTC PRN. HPI:     is a 38 y.o. female in today for an acute visit with a CC of sinus pain and pressure and ear pain and itching. She remains on her medication as noted below. Afebrile. She reports symptoms started 4 days ago. She has tried Flonase without relief. Allergies   Allergen Reactions    Azithromycin Swelling     z pack       Prior to Visit Medications    Medication Sig Taking? Authorizing Provider   ipratropium (ATROVENT) 0.06 % nasal spray 2 sprays by Nasal route 4 times daily Yes AUGUSTO Mckay NP   predniSONE (DELTASONE) 20 MG tablet Take 1 tablet by mouth daily for 5 days Yes AUGUSTO Mckay NP   triamcinolone (KENALOG) 0.025 % cream Apply topically 2 times daily. Apply to ears.  Yes AUGUSTO Mckay NP   valACYclovir (VALTREX) 1 g tablet TAKE 2 TABLETS INITIALLY THEN 2 TABLETS BY MOUTH EVERY 12 HOURS AS NEEDED FOR LESION(COLD SORE) Yes AUGUSTO Mckay NP   buPROPion (WELLBUTRIN XL) 150 MG extended release tablet TAKE 1 TABLET BY MOUTH EVERY MORNING Yes AUGUSTO Mckay NP   vitamin D (ERGOCALCIFEROL) 1.25 MG (50445 UT) CAPS capsule Take 1 capsule by mouth once a week Yes Roseanna Watts APRN - NP   amitriptyline (ELAVIL) 75 MG tablet Take 1 tablet by mouth nightly Yes AUGUSTO Mckay NP   acetaminophen (TYLENOL) 500 MG tablet Take by mouth every 6 hours as needed Yes Automatic Reconciliation, Ar   fluticasone (FLONASE) 50 MCG/ACT nasal spray SHAKE LIQUID AND USE 2 SPRAYS IN [facility]

## 2023-11-29 ENCOUNTER — OFFICE VISIT (OUTPATIENT)
Age: 43
End: 2023-11-29
Payer: COMMERCIAL

## 2023-11-29 VITALS
OXYGEN SATURATION: 97 % | HEIGHT: 66 IN | WEIGHT: 176 LBS | HEART RATE: 100 BPM | RESPIRATION RATE: 18 BRPM | DIASTOLIC BLOOD PRESSURE: 90 MMHG | BODY MASS INDEX: 28.28 KG/M2 | SYSTOLIC BLOOD PRESSURE: 140 MMHG | TEMPERATURE: 97.9 F

## 2023-11-29 DIAGNOSIS — J30.2 SEASONAL ALLERGIES: ICD-10-CM

## 2023-11-29 DIAGNOSIS — R21 RASH: Primary | ICD-10-CM

## 2023-11-29 DIAGNOSIS — M54.31 SCIATICA OF RIGHT SIDE: ICD-10-CM

## 2023-11-29 PROCEDURE — 99213 OFFICE O/P EST LOW 20 MIN: CPT | Performed by: NURSE PRACTITIONER

## 2023-11-29 RX ORDER — ETODOLAC 400 MG/1
400 TABLET, FILM COATED ORAL 2 TIMES DAILY
Qty: 60 TABLET | Refills: 5 | Status: SHIPPED | OUTPATIENT
Start: 2023-11-29 | End: 2024-05-27

## 2023-11-29 RX ORDER — METHYLPREDNISOLONE 4 MG/1
TABLET ORAL
Qty: 1 KIT | Refills: 0 | Status: SHIPPED | OUTPATIENT
Start: 2023-11-29 | End: 2023-12-05

## 2023-11-29 ASSESSMENT — PATIENT HEALTH QUESTIONNAIRE - PHQ9
SUM OF ALL RESPONSES TO PHQ QUESTIONS 1-9: 6
4. FEELING TIRED OR HAVING LITTLE ENERGY: 1
5. POOR APPETITE OR OVEREATING: 1
6. FEELING BAD ABOUT YOURSELF - OR THAT YOU ARE A FAILURE OR HAVE LET YOURSELF OR YOUR FAMILY DOWN: 0
10. IF YOU CHECKED OFF ANY PROBLEMS, HOW DIFFICULT HAVE THESE PROBLEMS MADE IT FOR YOU TO DO YOUR WORK, TAKE CARE OF THINGS AT HOME, OR GET ALONG WITH OTHER PEOPLE: 1
3. TROUBLE FALLING OR STAYING ASLEEP: 1
2. FEELING DOWN, DEPRESSED OR HOPELESS: 1
9. THOUGHTS THAT YOU WOULD BE BETTER OFF DEAD, OR OF HURTING YOURSELF: 0
SUM OF ALL RESPONSES TO PHQ QUESTIONS 1-9: 6
SUM OF ALL RESPONSES TO PHQ QUESTIONS 1-9: 6
7. TROUBLE CONCENTRATING ON THINGS, SUCH AS READING THE NEWSPAPER OR WATCHING TELEVISION: 1
8. MOVING OR SPEAKING SO SLOWLY THAT OTHER PEOPLE COULD HAVE NOTICED. OR THE OPPOSITE, BEING SO FIGETY OR RESTLESS THAT YOU HAVE BEEN MOVING AROUND A LOT MORE THAN USUAL: 1
SUM OF ALL RESPONSES TO PHQ QUESTIONS 1-9: 6

## 2023-11-29 ASSESSMENT — COLUMBIA-SUICIDE SEVERITY RATING SCALE - C-SSRS
4. HAVE YOU HAD THESE THOUGHTS AND HAD SOME INTENTION OF ACTING ON THEM?: NO
3. HAVE YOU BEEN THINKING ABOUT HOW YOU MIGHT KILL YOURSELF?: NO
7. DID THIS OCCUR IN THE LAST THREE MONTHS: NO
5. HAVE YOU STARTED TO WORK OUT OR WORKED OUT THE DETAILS OF HOW TO KILL YOURSELF? DO YOU INTEND TO CARRY OUT THIS PLAN?: NO

## 2023-11-30 NOTE — PROGRESS NOTES
Subjective:     Quinten Galvan is a 38 y.o. female who presents today with the following:  Chief Complaint   Patient presents with    Skin Problem     On back a bump    Leg Pain     right       Patient Active Problem List   Diagnosis    Insomnia due to stress    Environmental and seasonal allergies    Allergy desensitization therapy    H/O  section    Vitamin D deficiency    Abscess    Recurrent depression ([address])         COMPLIANT WITH MEDICATION:   Denies chest pain, dyspnea, palpitations, headache and blurred vision. Blood pressure normotensive. Rash on center of back responds well to Bactroban, Ms. Kaylyn Keys is requesting a refill of Bactroban (mupirocin)     Sciatica  on right side ongoing concern intermittent. Requesting a refill of etodolac     Seasonal allergies long term concern requesting a refill of flonase. depression screening addressed not at risk    abuse screening addressed denies    learning assessment addressed reviewed nurses notes    fall risk addressed not at risk    HM: addressed acute visit.      ROS:  Gen: denies fever, chills, fatigue, weight loss, weight gain  HEENT:denies blurry vision, nasal congestion, sore throat  Resp: denies dypsnea, cough, wheezing  CV: denies chest pain radiating to the jaws or arms, palpitations, lower extremity edema  Abd: denies nausea, vomiting, diarrhea, constipation  Neuro: denies numbness/tingling  Endo: denies polyuria, polydipsia, heat/cold intolerance  Heme: no lymphadenopathy    Allergies   Allergen Reactions    Azithromycin Swelling     z pack         Current Outpatient Medications:     Loratadine-Pseudoephedrine (CLARITIN-D 24 HOUR PO), Take by mouth, Disp: , Rfl:     mupirocin (BACTROBAN) 2 % ointment, Apply to infected wound BID PRN, Disp: 30 g, Rfl: 5    etodolac (LODINE) 400 MG tablet, Take 1 tablet by mouth 2 times daily, Disp: 60 tablet, Rfl: 5    methylPREDNISolone (MEDROL DOSEPACK) 4 MG tablet, Take by mouth., Disp: 1 kit, Rfl:

## 2024-02-20 ENCOUNTER — OFFICE VISIT (OUTPATIENT)
Age: 44
End: 2024-02-20
Payer: COMMERCIAL

## 2024-02-20 VITALS
BODY MASS INDEX: 27.58 KG/M2 | HEART RATE: 112 BPM | RESPIRATION RATE: 20 BRPM | OXYGEN SATURATION: 98 % | HEIGHT: 66 IN | DIASTOLIC BLOOD PRESSURE: 72 MMHG | TEMPERATURE: 98.1 F | SYSTOLIC BLOOD PRESSURE: 136 MMHG | WEIGHT: 171.6 LBS

## 2024-02-20 DIAGNOSIS — S46.811A STRAIN OF RIGHT TRAPEZIUS MUSCLE, INITIAL ENCOUNTER: Primary | ICD-10-CM

## 2024-02-20 PROCEDURE — 99214 OFFICE O/P EST MOD 30 MIN: CPT | Performed by: NURSE PRACTITIONER

## 2024-02-20 RX ORDER — PREDNISONE 20 MG/1
20 TABLET ORAL DAILY
Qty: 7 TABLET | Refills: 0 | Status: SHIPPED | OUTPATIENT
Start: 2024-02-20 | End: 2024-02-27

## 2024-02-20 RX ORDER — CYCLOBENZAPRINE HCL 10 MG
10 TABLET ORAL
Qty: 7 TABLET | Refills: 0 | Status: SHIPPED | OUTPATIENT
Start: 2024-02-20 | End: 2024-02-27

## 2024-02-20 ASSESSMENT — PATIENT HEALTH QUESTIONNAIRE - PHQ9
8. MOVING OR SPEAKING SO SLOWLY THAT OTHER PEOPLE COULD HAVE NOTICED. OR THE OPPOSITE, BEING SO FIGETY OR RESTLESS THAT YOU HAVE BEEN MOVING AROUND A LOT MORE THAN USUAL: 0
SUM OF ALL RESPONSES TO PHQ9 QUESTIONS 1 & 2: 0
6. FEELING BAD ABOUT YOURSELF - OR THAT YOU ARE A FAILURE OR HAVE LET YOURSELF OR YOUR FAMILY DOWN: 0
1. LITTLE INTEREST OR PLEASURE IN DOING THINGS: 0
5. POOR APPETITE OR OVEREATING: 0
7. TROUBLE CONCENTRATING ON THINGS, SUCH AS READING THE NEWSPAPER OR WATCHING TELEVISION: 0
SUM OF ALL RESPONSES TO PHQ QUESTIONS 1-9: 0
SUM OF ALL RESPONSES TO PHQ QUESTIONS 1-9: 0
9. THOUGHTS THAT YOU WOULD BE BETTER OFF DEAD, OR OF HURTING YOURSELF: 0
SUM OF ALL RESPONSES TO PHQ QUESTIONS 1-9: 0
2. FEELING DOWN, DEPRESSED OR HOPELESS: 0
SUM OF ALL RESPONSES TO PHQ QUESTIONS 1-9: 0
10. IF YOU CHECKED OFF ANY PROBLEMS, HOW DIFFICULT HAVE THESE PROBLEMS MADE IT FOR YOU TO DO YOUR WORK, TAKE CARE OF THINGS AT HOME, OR GET ALONG WITH OTHER PEOPLE: 0
4. FEELING TIRED OR HAVING LITTLE ENERGY: 0
3. TROUBLE FALLING OR STAYING ASLEEP: 0

## 2024-02-20 NOTE — PROGRESS NOTES
\"Have you been to the ER, urgent care clinic since your last visit?  Hospitalized since your last visit?\"    NO    “Have you seen or consulted any other health care providers outside of [facility] since your last visit?”    NO    “Have you had a colorectal cancer screening such as a colonoscopy/FIT/Cologuard?    NO      “Have you had a pap smear?”    NO

## 2024-02-21 NOTE — PROGRESS NOTES
Chief Complaint   Patient presents with    Shoulder Pain       ASSESSMENT AND PLAN:      Diagnosis Orders   1. Strain of right trapezius muscle, initial encounter  predniSONE (DELTASONE) 20 MG tablet    cyclobenzaprine (FLEXERIL) 10 MG tablet        Tx as above.     Patient aware of plan of care and verbalized understanding. Questions answered. RTC June for her annual, or sooner if needed.    HPI:     is a 43 y.o. female in today for an acute visit with a CC of R upper arm, anterior chest, and upper back pain on the R after holding several grocery bags on Friday of last week. She reports full ROM but her R shoulder clicks when she rotates her arm.     Allergies   Allergen Reactions    Azithromycin Swelling     z pack       Prior to Visit Medications    Medication Sig Taking? Authorizing Provider   predniSONE (DELTASONE) 20 MG tablet Take 1 tablet by mouth daily for 7 days Yes Valeria Rey APRN - NP   cyclobenzaprine (FLEXERIL) 10 MG tablet Take 1 tablet by mouth nightly for 7 days Yes Valeria Rey APRN - NP   Loratadine-Pseudoephedrine (CLARITIN-D 24 HOUR PO) Take by mouth  Provider, MD Bernadine   mupirocin (BACTROBAN) 2 % ointment Apply to infected wound BID PRN  Brittnee Elizalde APRN - NP   etodolac (LODINE) 400 MG tablet Take 1 tablet by mouth 2 times daily  Brittnee Elizalde APRN - NP   ipratropium (ATROVENT) 0.06 % nasal spray 2 sprays by Nasal route 4 times daily  Valeria Rey APRN - NP   valACYclovir (VALTREX) 1 g tablet TAKE 2 TABLETS INITIALLY THEN 2 TABLETS BY MOUTH EVERY 12 HOURS AS NEEDED FOR LESION(COLD SORE)  Valeria Rey APRN - NP   vitamin D (ERGOCALCIFEROL) 1.25 MG (30727 UT) CAPS capsule Take 1 capsule by mouth once a week  Roseanna Watts APRN - NP   acetaminophen (TYLENOL) 500 MG tablet Take by mouth every 6 hours as needed  Automatic Reconciliation, Ar   fluticasone (FLONASE) 50 MCG/ACT nasal spray SHAKE LIQUID AND USE 2 SPRAYS IN EACH NOSTRIL

## 2024-03-08 ENCOUNTER — OFFICE VISIT (OUTPATIENT)
Age: 44
End: 2024-03-08
Payer: COMMERCIAL

## 2024-03-08 VITALS
RESPIRATION RATE: 18 BRPM | SYSTOLIC BLOOD PRESSURE: 110 MMHG | DIASTOLIC BLOOD PRESSURE: 70 MMHG | HEIGHT: 66 IN | WEIGHT: 176.6 LBS | OXYGEN SATURATION: 99 % | TEMPERATURE: 97.5 F | HEART RATE: 95 BPM | BODY MASS INDEX: 28.38 KG/M2

## 2024-03-08 DIAGNOSIS — J02.0 ACUTE STREPTOCOCCAL PHARYNGITIS: Primary | ICD-10-CM

## 2024-03-08 LAB
GROUP A STREP ANTIGEN, POC: POSITIVE
VALID INTERNAL CONTROL, POC: YES

## 2024-03-08 PROCEDURE — 87880 STREP A ASSAY W/OPTIC: CPT | Performed by: NURSE PRACTITIONER

## 2024-03-08 PROCEDURE — 99214 OFFICE O/P EST MOD 30 MIN: CPT | Performed by: NURSE PRACTITIONER

## 2024-03-08 RX ORDER — CLINDAMYCIN HYDROCHLORIDE 300 MG/1
300 CAPSULE ORAL 3 TIMES DAILY
Qty: 21 CAPSULE | Refills: 0 | Status: SHIPPED | OUTPATIENT
Start: 2024-03-08 | End: 2024-03-15

## 2024-03-08 NOTE — PROGRESS NOTES
Chief Complaint   Patient presents with    Congestion       ASSESSMENT AND PLAN:      Diagnosis Orders   1. Acute streptococcal pharyngitis  clindamycin (CLEOCIN) 300 MG capsule    AMB POC RAPID STREP A        Take complete course of antibiotics.  Encouraged increased fluid intake.  Monitor for fever.  Gargle.  Discussed contagious precautions.  Replace toothbrush in three days.  Discussed return/ ER precautions.  Follow up as needed. Ok to return to school or work 24 hours after starting antibiotics.     Patient aware of plan of care and verbalized understanding. Questions answered. RTC PRN.    HPI:     is a 43 y.o. female in today for a sore throat, ear pain, and congestion since Monday. She may have had a fever, felt warm.  and son have similar symptoms.     Allergies   Allergen Reactions    Azithromycin Swelling     z pack       Prior to Visit Medications    Medication Sig Taking? Authorizing Provider   clindamycin (CLEOCIN) 300 MG capsule Take 1 capsule by mouth 3 times daily for 7 days Yes Valeria Rey APRN - NP   Loratadine-Pseudoephedrine (CLARITIN-D 24 HOUR PO) Take by mouth Yes Provider, MD Bernadine   mupirocin (BACTROBAN) 2 % ointment Apply to infected wound BID PRN Yes Brittnee Elizalde APRN - NP   etodolac (LODINE) 400 MG tablet Take 1 tablet by mouth 2 times daily Yes Brittnee Elizalde APRN - NP   ipratropium (ATROVENT) 0.06 % nasal spray 2 sprays by Nasal route 4 times daily Yes Valeria Rey APRN - NP   valACYclovir (VALTREX) 1 g tablet TAKE 2 TABLETS INITIALLY THEN 2 TABLETS BY MOUTH EVERY 12 HOURS AS NEEDED FOR LESION(COLD SORE) Yes Valeria Rey APRN - NP   vitamin D (ERGOCALCIFEROL) 1.25 MG (57747 UT) CAPS capsule Take 1 capsule by mouth once a week Yes Roseanna Watts APRN - NP   acetaminophen (TYLENOL) 500 MG tablet Take by mouth every 6 hours as needed Yes Automatic Reconciliation, Ar   fluticasone (FLONASE) 50 MCG/ACT nasal spray SHAKE LIQUID AND

## 2024-03-11 ENCOUNTER — TELEPHONE (OUTPATIENT)
Age: 44
End: 2024-03-11

## 2024-03-11 NOTE — TELEPHONE ENCOUNTER
Ok to do a note. The antibiotic will cover ear infections, although I checked her ear and she doesn't have an ear infection. She can take ibuprofen 600 mg TID with meals, use her nasal spray, and she can take OTC Sudafed x 3 days to help open up her eustachian tube.

## 2024-03-11 NOTE — TELEPHONE ENCOUNTER
Pt states she is still not feeling better so will be out of work until Friday 3/15 and would like to get work note. Please advise.

## 2024-05-03 DIAGNOSIS — E55.9 VITAMIN D DEFICIENCY: ICD-10-CM

## 2024-05-06 RX ORDER — ERGOCALCIFEROL 1.25 MG/1
50000 CAPSULE ORAL WEEKLY
Qty: 12 CAPSULE | Refills: 1 | Status: SHIPPED | OUTPATIENT
Start: 2024-05-06

## 2024-05-22 ENCOUNTER — OFFICE VISIT (OUTPATIENT)
Age: 44
End: 2024-05-22
Payer: COMMERCIAL

## 2024-05-22 VITALS
DIASTOLIC BLOOD PRESSURE: 84 MMHG | HEIGHT: 66 IN | OXYGEN SATURATION: 99 % | BODY MASS INDEX: 29.73 KG/M2 | HEART RATE: 98 BPM | WEIGHT: 185 LBS | SYSTOLIC BLOOD PRESSURE: 132 MMHG | TEMPERATURE: 97.5 F | RESPIRATION RATE: 18 BRPM

## 2024-05-22 DIAGNOSIS — G89.29 CHRONIC BILATERAL LOW BACK PAIN WITH BILATERAL SCIATICA: Primary | ICD-10-CM

## 2024-05-22 DIAGNOSIS — J30.89 ENVIRONMENTAL AND SEASONAL ALLERGIES: ICD-10-CM

## 2024-05-22 DIAGNOSIS — F33.0 MILD EPISODE OF RECURRENT MAJOR DEPRESSIVE DISORDER (HCC): ICD-10-CM

## 2024-05-22 DIAGNOSIS — J32.9 CHRONIC SINUSITIS, UNSPECIFIED LOCATION: ICD-10-CM

## 2024-05-22 DIAGNOSIS — M54.42 CHRONIC BILATERAL LOW BACK PAIN WITH BILATERAL SCIATICA: Primary | ICD-10-CM

## 2024-05-22 DIAGNOSIS — M54.41 CHRONIC BILATERAL LOW BACK PAIN WITH BILATERAL SCIATICA: Primary | ICD-10-CM

## 2024-05-22 PROCEDURE — 99215 OFFICE O/P EST HI 40 MIN: CPT

## 2024-05-22 RX ORDER — FLUTICASONE PROPIONATE 50 MCG
2 SPRAY, SUSPENSION (ML) NASAL DAILY
Qty: 16 G | Refills: 1 | Status: SHIPPED | OUTPATIENT
Start: 2024-05-22

## 2024-05-22 RX ORDER — METHYLPREDNISOLONE 4 MG/1
TABLET ORAL
Qty: 1 KIT | Refills: 0 | Status: SHIPPED | OUTPATIENT
Start: 2024-05-22 | End: 2024-05-28

## 2024-05-22 RX ORDER — BUPROPION HYDROCHLORIDE 150 MG/1
150 TABLET ORAL EVERY MORNING
Qty: 90 TABLET | Refills: 0 | Status: SHIPPED | OUTPATIENT
Start: 2024-05-22

## 2024-05-22 RX ORDER — MELOXICAM 15 MG/1
15 TABLET ORAL DAILY
Qty: 90 TABLET | Refills: 1 | Status: SHIPPED | OUTPATIENT
Start: 2024-05-22

## 2024-05-22 RX ORDER — FLUTICASONE PROPIONATE 50 MCG
2 SPRAY, SUSPENSION (ML) NASAL DAILY
Qty: 48 G | OUTPATIENT
Start: 2024-05-22

## 2024-05-22 ASSESSMENT — PATIENT HEALTH QUESTIONNAIRE - PHQ9
2. FEELING DOWN, DEPRESSED OR HOPELESS: NEARLY EVERY DAY
SUM OF ALL RESPONSES TO PHQ QUESTIONS 1-9: 20
SUM OF ALL RESPONSES TO PHQ QUESTIONS 1-9: 20
5. POOR APPETITE OR OVEREATING: NEARLY EVERY DAY
9. THOUGHTS THAT YOU WOULD BE BETTER OFF DEAD, OR OF HURTING YOURSELF: NOT AT ALL
3. TROUBLE FALLING OR STAYING ASLEEP: NEARLY EVERY DAY
7. TROUBLE CONCENTRATING ON THINGS, SUCH AS READING THE NEWSPAPER OR WATCHING TELEVISION: NEARLY EVERY DAY
8. MOVING OR SPEAKING SO SLOWLY THAT OTHER PEOPLE COULD HAVE NOTICED. OR THE OPPOSITE, BEING SO FIGETY OR RESTLESS THAT YOU HAVE BEEN MOVING AROUND A LOT MORE THAN USUAL: NEARLY EVERY DAY
SUM OF ALL RESPONSES TO PHQ QUESTIONS 1-9: 20
SUM OF ALL RESPONSES TO PHQ QUESTIONS 1-9: 20
6. FEELING BAD ABOUT YOURSELF - OR THAT YOU ARE A FAILURE OR HAVE LET YOURSELF OR YOUR FAMILY DOWN: MORE THAN HALF THE DAYS
4. FEELING TIRED OR HAVING LITTLE ENERGY: NEARLY EVERY DAY
10. IF YOU CHECKED OFF ANY PROBLEMS, HOW DIFFICULT HAVE THESE PROBLEMS MADE IT FOR YOU TO DO YOUR WORK, TAKE CARE OF THINGS AT HOME, OR GET ALONG WITH OTHER PEOPLE: EXTREMELY DIFFICULT

## 2024-05-22 ASSESSMENT — COLUMBIA-SUICIDE SEVERITY RATING SCALE - C-SSRS
1. WITHIN THE PAST MONTH, HAVE YOU WISHED YOU WERE DEAD OR WISHED YOU COULD GO TO SLEEP AND NOT WAKE UP?: NO
2. HAVE YOU ACTUALLY HAD ANY THOUGHTS OF KILLING YOURSELF?: NO
6. HAVE YOU EVER DONE ANYTHING, STARTED TO DO ANYTHING, OR PREPARED TO DO ANYTHING TO END YOUR LIFE?: NO

## 2024-05-22 NOTE — PATIENT INSTRUCTIONS
Mental Health Providers  [facility]  Meredith Sahu, [facility]  [phone]  (VIRTUAL ONLY, adolescents & adults--specializes in trauma, anxiety, substance abuse)  [facility]  *[place]  [phone]  *[place]  [phone] Main number  *Cristal Frye  [phone]  Sanjiv Quiroz, [facility]  [phone]  Anton Hanna [facility]  [phone] (mostly kids)   Alisha Hughes [facility] and Mari Gary [facility] [phone]  [facility] --Mare Do [facility] office [phone] (all ages)  Rocio Qureshi [facility]   (limited hours Mon afternoon and some evenings)  [phone] (Ages 15 and above, mostly adults)  [facility], [facility]  [phone] (mostly kids)  [facility]-- Remigio Aponte Psy.D  [place]  [phone]  [facility]  [place] office  [phone]  Connie Chandler [facility]  [facility] office  [phone]  Harjinder Boland office  [phone] (Substance abuse specialty)  Blanche Madrid [facility] [phone]  [facility]  [place] [phone]  [facility] office:  [phone]  [place] Office: [phone]

## 2024-05-23 ASSESSMENT — ENCOUNTER SYMPTOMS
RESPIRATORY NEGATIVE: 1
COUGH: 0
WHEEZING: 0
DIARRHEA: 0
CONSTIPATION: 0
BLOOD IN STOOL: 0
EYES NEGATIVE: 1
SHORTNESS OF BREATH: 0

## 2024-05-23 NOTE — PROGRESS NOTES
\"Have you been to the ER, urgent care clinic since your last visit?  Hospitalized since your last visit?\"    NO    “Have you seen or consulted any other health care providers outside of [facility] since your last visit?”    NO     “Have you had a pap smear?”      Yes records requested  No cervical cancer screening on file   Yes records requested      “Have you had a colorectal cancer screening such as a colonoscopy/FIT/Cologuard?    no    No colonoscopy on file  No cologuard on file  No FIT/FOBT on file   No flexible sigmoidoscopy on file         Click Here for Release of Records Request      Chief Complaint   Patient presents with    Nasal Congestion         Vitals:    05/22/24 1130   BP: 132/84   Pulse: 98   Resp: 18   Temp: 97.5 °F (36.4 °C)   SpO2: 99%     
medication treatment for her anxiety and depression; she is wary of taking anything that may contribute to her fatigue and would like a trial bupropion once again. Patient educated as below:    Will titrate gradually depending on the response.   Patient will be seen or communicate within a few weeks their progress.   Risks, benefits, and side effects of medications were reviewed and discussed in detail including the black box warning about the potential for increased suicide risk among adolescents treated with these medications.   Patient agreed to alert others and to seek help promptly if they would experience any intensification of their previous passive thoughts of life not being worth living.   Patient agreed that the potential benefit from a medication trial outweighs the acknowledged risks.  Patient advised that if feeling that they feel they would hurt themselves, they should call someone or go to the ED/Call 911 IMMEDIATELY.   Suicide hotline number 2-287-645-COPE     Discussed expected benefits vs possible side effects of SSRIs.  Explained maximal effect may not be noted for 6 weeks.  Discussed possibility of increased suicidal tendencies during onset of action. Patient contracts for safety and can identify an accessible social support network.  Patient underdstands that medication is more effective when partnered with counseling. Follow up appointment scheduled for 6 weeks.      Medication Side Effects and Warnings were discussed with patient: Yes  Patient Labs were reviewed:  Yes  Patient Past Records were reviewed:  Yes    Patient aware of plan of care and verbalized understanding. Questions answered. RTC 6 weeks or sooner if needed.    On this date 5/22/2024 I have spent 45 minutes reviewing previous notes, test results and face to face with the patient discussing the diagnosis and importance of compliance with the treatment plan as well as documenting on the day of the visit.    AUGUSTO Ceja

## 2024-06-24 ENCOUNTER — PATIENT MESSAGE (OUTPATIENT)
Age: 44
End: 2024-06-24

## 2024-06-24 DIAGNOSIS — M54.42 CHRONIC BILATERAL LOW BACK PAIN WITH BILATERAL SCIATICA: Primary | ICD-10-CM

## 2024-06-24 DIAGNOSIS — G89.29 CHRONIC BILATERAL LOW BACK PAIN WITH BILATERAL SCIATICA: Primary | ICD-10-CM

## 2024-06-24 DIAGNOSIS — M54.41 CHRONIC BILATERAL LOW BACK PAIN WITH BILATERAL SCIATICA: Primary | ICD-10-CM

## 2024-06-25 NOTE — TELEPHONE ENCOUNTER
From: Lakeisha Diaz  To: Valeria Rey  Sent: 6/24/2024 7:56 PM EDT  Subject: Physical therapy referral     Can you change my referral to [facility] in [place], [place]?

## 2024-07-16 ENCOUNTER — TELEPHONE (OUTPATIENT)
Age: 44
End: 2024-07-16

## 2024-07-16 NOTE — TELEPHONE ENCOUNTER
Pt states that she works at [employer] and she has been sent home twice because they have detected something in her body when she has gone through their body scanner. They stated that she will need an x-ray of whatever they are detecting in order for her to return to work. She is requesting and order for x-ray, would like to know if she needs visit first. Please advise.

## 2024-08-30 ENCOUNTER — OFFICE VISIT (OUTPATIENT)
Age: 44
End: 2024-08-30
Payer: COMMERCIAL

## 2024-08-30 VITALS
TEMPERATURE: 97.8 F | WEIGHT: 184.8 LBS | OXYGEN SATURATION: 98 % | RESPIRATION RATE: 18 BRPM | SYSTOLIC BLOOD PRESSURE: 130 MMHG | DIASTOLIC BLOOD PRESSURE: 70 MMHG | HEART RATE: 97 BPM | BODY MASS INDEX: 29.83 KG/M2

## 2024-08-30 DIAGNOSIS — M54.12 CERVICAL RADICULOPATHY: Primary | ICD-10-CM

## 2024-08-30 PROCEDURE — 99214 OFFICE O/P EST MOD 30 MIN: CPT | Performed by: NURSE PRACTITIONER

## 2024-08-30 PROCEDURE — 96372 THER/PROPH/DIAG INJ SC/IM: CPT | Performed by: NURSE PRACTITIONER

## 2024-08-30 RX ORDER — METHYLPREDNISOLONE 4 MG
TABLET, DOSE PACK ORAL
Qty: 1 KIT | Refills: 0 | Status: SHIPPED | OUTPATIENT
Start: 2024-08-30 | End: 2024-09-05

## 2024-08-30 RX ORDER — KETOROLAC TROMETHAMINE 30 MG/ML
30 INJECTION, SOLUTION INTRAMUSCULAR; INTRAVENOUS ONCE
Status: COMPLETED | OUTPATIENT
Start: 2024-08-30 | End: 2024-08-30

## 2024-08-30 RX ORDER — CYCLOBENZAPRINE HCL 10 MG
10 TABLET ORAL 3 TIMES DAILY PRN
COMMUNITY

## 2024-08-30 RX ADMIN — KETOROLAC TROMETHAMINE 30 MG: 30 INJECTION, SOLUTION INTRAMUSCULAR; INTRAVENOUS at 10:06

## 2024-08-30 SDOH — ECONOMIC STABILITY: FOOD INSECURITY: WITHIN THE PAST 12 MONTHS, YOU WORRIED THAT YOUR FOOD WOULD RUN OUT BEFORE YOU GOT MONEY TO BUY MORE.: NEVER TRUE

## 2024-08-30 SDOH — ECONOMIC STABILITY: FOOD INSECURITY: WITHIN THE PAST 12 MONTHS, THE FOOD YOU BOUGHT JUST DIDN'T LAST AND YOU DIDN'T HAVE MONEY TO GET MORE.: NEVER TRUE

## 2024-08-30 SDOH — ECONOMIC STABILITY: INCOME INSECURITY: HOW HARD IS IT FOR YOU TO PAY FOR THE VERY BASICS LIKE FOOD, HOUSING, MEDICAL CARE, AND HEATING?: NOT VERY HARD

## 2024-08-30 NOTE — PROGRESS NOTES
Chief Complaint   Patient presents with    Neck Pain     All over and left shoulder  [facility] fu  R 5       ASSESSMENT AND PLAN:       1. Cervical radiculopathy    - XR CERVICAL SPINE (4 OR 5 VIEWS); Future  - External Referral To Physical Therapy  - methylPREDNISolone (MEDROL DOSEPACK) 4 MG tablet; Take by mouth.  Dispense: 1 kit; Refill: 0  - ketorolac (TORADOL) injection 30 mg     Toradol IM today. Imaging at [facility] to eval for foraminal stenosis. Referral placed to PT. Repeat steroids.     Patient aware of plan of care and verbalized understanding. Questions answered. RTC 1 month for a check up, or sooner if needed.    HPI:     is a 43 y.o. female in today for an acute visit with a CC of L sided neck pain that radiates down her L arm to her elbow. It started at the beginning of the month. No known injury. She takes a muscle relaxer QHS and meloxicam daily without significant improvement.     Allergies   Allergen Reactions    Azithromycin Swelling     z pack       Prior to Visit Medications    Medication Sig Taking? Authorizing Provider   cyclobenzaprine (FLEXERIL) 10 MG tablet Take 1 tablet by mouth 3 times daily as needed for Muscle spasms Yes Provider, MD Bernadine   meloxicam (MOBIC) 15 MG tablet Take 1 tablet by mouth daily Yes Roseanna Watts APRN - NP   fluticasone (FLONASE) 50 MCG/ACT nasal spray 2 sprays by Nasal route daily Yes Roseanna Watts APRN - NP   buPROPion (WELLBUTRIN XL) 150 MG extended release tablet Take 1 tablet by mouth every morning Yes Roseanna Watts APRN - NP   vitamin D (ERGOCALCIFEROL) 1.25 MG (33787 UT) CAPS capsule TAKE 1 CAPSULE BY MOUTH 1 TIME A WEEK Yes Valeria Rey APRN - NP   valACYclovir (VALTREX) 1 g tablet TAKE 2 TABLETS INITIALLY THEN 2 TABLETS BY MOUTH EVERY 12 HOURS AS NEEDED FOR LESION(COLD SORE) Yes Valeria Rey APRN - NP   methylPREDNISolone (MEDROL DOSEPACK) 4 MG tablet Take by mouth. Yes Valeria Rey APRN - NP       Past

## 2024-08-30 NOTE — PROGRESS NOTES
Toradol 30 mg / ml administered in Left Deltoid via IM per Anabella's order.  Patient tolerated medication well.  AVS provided to patient with medication information.  Patient states understanding.

## 2024-08-30 NOTE — PROGRESS NOTES
\"Have you been to the ER, urgent care clinic since your last visit?  Hospitalized since your last visit?\"    YES - When: approximately 3  weeks ago.  Where and Why: [facility], Neck pain.    “Have you seen or consulted any other health care providers outside of [facility] since your last visit?”    NO    “Have you had a colorectal cancer screening such as a colonoscopy/FIT/Cologuard?    NO    No colonoscopy on file  No cologuard on file  No FIT/FOBT on file   No flexible sigmoidoscopy on file        Have you had a mammogram?”   YES - Where[facility]  ? 2024Nurse/CMA to request most recent records if not in the chart       “Have you had a pap smear?”    YES - Where: [facility] ? 2024 Nurse/CMA to request most recent records if not in the chart    Chief Complaint   Patient presents with    Neck Pain     All over and left shoulder  [facility] fu  R 5       Vitals:    08/30/24 0915   BP: 130/70   Pulse: 97   Resp: 18   Temp: 97.8 °F (36.6 °C)   SpO2: 98%

## 2024-09-03 ENCOUNTER — HOSPITAL ENCOUNTER (OUTPATIENT)
Facility: HOSPITAL | Age: 44
Discharge: HOME | End: 2024-09-06
Payer: COMMERCIAL

## 2024-09-03 DIAGNOSIS — M54.12 CERVICAL RADICULOPATHY: ICD-10-CM

## 2024-09-03 PROCEDURE — 72050 X-RAY EXAM NECK SPINE 4/5VWS: CPT

## 2024-09-10 RX ORDER — VALACYCLOVIR HYDROCHLORIDE 1 G/1
TABLET, FILM COATED ORAL
Qty: 24 TABLET | Refills: 0 | Status: SHIPPED | OUTPATIENT
Start: 2024-09-10

## 2024-09-30 ENCOUNTER — OFFICE VISIT (OUTPATIENT)
Age: 44
End: 2024-09-30
Payer: COMMERCIAL

## 2024-09-30 VITALS
HEART RATE: 98 BPM | OXYGEN SATURATION: 99 % | WEIGHT: 185 LBS | BODY MASS INDEX: 29.86 KG/M2 | SYSTOLIC BLOOD PRESSURE: 134 MMHG | TEMPERATURE: 97.3 F | DIASTOLIC BLOOD PRESSURE: 88 MMHG | RESPIRATION RATE: 20 BRPM

## 2024-09-30 DIAGNOSIS — R35.0 URINARY FREQUENCY: ICD-10-CM

## 2024-09-30 DIAGNOSIS — G56.21 CUBITAL TUNNEL SYNDROME ON RIGHT: Primary | ICD-10-CM

## 2024-09-30 DIAGNOSIS — N95.1 HOT FLASHES DUE TO MENOPAUSE: ICD-10-CM

## 2024-09-30 LAB
BILIRUBIN, URINE, POC: NEGATIVE
BLOOD URINE, POC: NEGATIVE
GLUCOSE URINE, POC: NEGATIVE
KETONES, URINE, POC: NEGATIVE
LEUKOCYTE ESTERASE, URINE, POC: NEGATIVE
NITRITE, URINE, POC: NEGATIVE
PH, URINE, POC: 5.5 (ref 4.6–8)
PROTEIN,URINE, POC: NEGATIVE
SPECIFIC GRAVITY, URINE, POC: 1.02 (ref 1–1.03)
URINALYSIS CLARITY, POC: CLEAR
URINALYSIS COLOR, POC: YELLOW
UROBILINOGEN, POC: NORMAL

## 2024-09-30 PROCEDURE — 99214 OFFICE O/P EST MOD 30 MIN: CPT

## 2024-09-30 PROCEDURE — 81003 URINALYSIS AUTO W/O SCOPE: CPT

## 2024-09-30 NOTE — PROGRESS NOTES
\"Have you been to the ER, urgent care clinic since your last visit?  Hospitalized since your last visit?\"    NO    “Have you seen or consulted any other health care providers outside our system since your last visit?”    NO     “Have you had a pap smear?”    NO    No cervical cancer screening on file       “Have you had a colorectal cancer screening such as a colonoscopy/FIT/Cologuard?    NO    No colonoscopy on file  No cologuard on file  No FIT/FOBT on file   No flexible sigmoidoscopy on file              Chief Complaint   Patient presents with    Joint Pain     X 1 mnth    Hand Pain     Right radiating up arm     Other    Anxiety     Mood swings, night sweats        Vitals:    09/30/24 0823   BP: (!) 150/90   Pulse: 98   Resp: 20   Temp: 97.3 °F (36.3 °C)   SpO2: 99%     
antepartum 9/12/2012    Psychiatric problem     DEPRESSION, ANXIETY       Family History   Problem Relation Age of Onset    Osteoarthritis Father     Hypertension Maternal Grandmother     Diabetes Mother     Diabetes Maternal Grandfather     Hypertension Maternal Grandfather     Hypertension Mother     Diabetes Maternal Grandmother        Review of Systems   Constitutional:  Positive for diaphoresis.   Genitourinary:  Positive for frequency.   Musculoskeletal:  Positive for arthralgias.   Psychiatric/Behavioral:  Positive for sleep disturbance. The patient is nervous/anxious.         Vitals:    09/30/24 0823 09/30/24 0844   BP: (!) 150/90 134/88   Pulse: 98    Resp: 20    Temp: 97.3 °F (36.3 °C)    SpO2: 99%    Weight: 83.9 kg (185 lb)         Wt Readings from Last 3 Encounters:   09/30/24 83.9 kg (185 lb)   08/30/24 83.8 kg (184 lb 12.8 oz)   05/22/24 83.9 kg (185 lb)           5/22/2024    11:32 AM   PHQ-9    Feeling down, depressed, or hopeless 3   Trouble falling or staying asleep, or sleeping too much 3   Feeling tired or having little energy 3   Poor appetite or overeating 3   Feeling bad about yourself - or that you are a failure or have let yourself or your family down 2   Trouble concentrating on things, such as reading the newspaper or watching television 3   Moving or speaking so slowly that other people could have noticed. Or the opposite - being so fidgety or restless that you have been moving around a lot more than usual 3   Thoughts that you would be better off dead, or of hurting yourself in some way 0   PHQ-9 Total Score 20   If you checked off any problems, how difficult have these problems made it for you to do your work, take care of things at home, or get along with other people? 3        Physical Exam  Vitals and nursing note reviewed.   Constitutional:       General: She is not in acute distress.     Appearance: Normal appearance.   HENT:      Head: Normocephalic and atraumatic.   Eyes:

## 2024-10-06 ENCOUNTER — HOSPITAL ENCOUNTER (EMERGENCY)
Facility: HOSPITAL | Age: 44
Discharge: HOME | End: 2024-10-06
Attending: EMERGENCY MEDICINE
Payer: COMMERCIAL

## 2024-10-06 VITALS
BODY MASS INDEX: 28.93 KG/M2 | SYSTOLIC BLOOD PRESSURE: 155 MMHG | HEIGHT: 66 IN | WEIGHT: 180 LBS | DIASTOLIC BLOOD PRESSURE: 98 MMHG | HEART RATE: 87 BPM | RESPIRATION RATE: 17 BRPM | TEMPERATURE: 98.2 F | OXYGEN SATURATION: 99 %

## 2024-10-06 DIAGNOSIS — M54.12 CERVICAL RADICULOPATHY: ICD-10-CM

## 2024-10-06 DIAGNOSIS — S46.911A STRAIN OF RIGHT SHOULDER, INITIAL ENCOUNTER: Primary | ICD-10-CM

## 2024-10-06 DIAGNOSIS — F41.0 PANIC ATTACKS: ICD-10-CM

## 2024-10-06 PROCEDURE — 99283 EMERGENCY DEPT VISIT LOW MDM: CPT

## 2024-10-06 RX ORDER — LORAZEPAM 0.5 MG/1
0.5 TABLET ORAL 3 TIMES DAILY PRN
Qty: 12 TABLET | Refills: 0 | Status: SHIPPED | OUTPATIENT
Start: 2024-10-06 | End: 2024-10-16

## 2024-10-06 RX ORDER — METHOCARBAMOL 750 MG/1
750 TABLET, FILM COATED ORAL 4 TIMES DAILY
Qty: 40 TABLET | Refills: 0 | Status: SHIPPED | OUTPATIENT
Start: 2024-10-06 | End: 2024-10-16

## 2024-10-06 RX ORDER — NAPROXEN SODIUM 550 MG/1
550 TABLET ORAL 2 TIMES DAILY WITH MEALS
Qty: 20 TABLET | Refills: 0 | Status: SHIPPED | OUTPATIENT
Start: 2024-10-06 | End: 2024-10-16

## 2024-10-06 RX ORDER — PREDNISONE 10 MG/1
TABLET ORAL
Qty: 21 TABLET | Refills: 0 | Status: SHIPPED | OUTPATIENT
Start: 2024-10-06 | End: 2024-10-15

## 2024-10-06 ASSESSMENT — PAIN SCALES - GENERAL: PAINLEVEL_OUTOF10: 10

## 2024-10-06 ASSESSMENT — PAIN - FUNCTIONAL ASSESSMENT: PAIN_FUNCTIONAL_ASSESSMENT: 0-10

## 2024-10-06 ASSESSMENT — LIFESTYLE VARIABLES
HOW MANY STANDARD DRINKS CONTAINING ALCOHOL DO YOU HAVE ON A TYPICAL DAY: PATIENT DOES NOT DRINK
HOW OFTEN DO YOU HAVE A DRINK CONTAINING ALCOHOL: NEVER

## 2024-10-06 ASSESSMENT — PAIN DESCRIPTION - DESCRIPTORS: DESCRIPTORS: PATIENT UNABLE TO DESCRIBE

## 2024-10-06 ASSESSMENT — PAIN DESCRIPTION - LOCATION: LOCATION: SHOULDER

## 2024-10-06 ASSESSMENT — PAIN DESCRIPTION - PAIN TYPE: TYPE: ACUTE PAIN

## 2024-10-06 ASSESSMENT — PAIN DESCRIPTION - ORIENTATION: ORIENTATION: RIGHT

## 2024-10-06 NOTE — ED PROVIDER NOTES
[facility] EMERGENCY DEP  EMERGENCY DEPARTMENT ENCOUNTER       Pt Name: Lakeisha Diaz  MRN: [id number]  Birthdate 1980  Date of evaluation: 10/6/2024  Provider: Lavelle Villafuerte MD   PCP: Valeria Rey APRN - NP  Note Started: 4:27 PM 10/6/24     CHIEF COMPLAINT       Chief Complaint   Patient presents with    Shoulder Pain     right        HISTORY OF PRESENT ILLNESS: 1 or more elements      History From: Patient, History limited by: none     Lakeisha Diaz is a pleasant 43 y.o. female who presents to the emergency department by car, had someone drive her to the hospital for evaluation of right shoulder pain.  Patient states that she woke up with the pain in her right shoulder, is having difficulty lifting the shoulder, but can passively raise the shoulder with her other hand.  Patient has been recently treated for cervical radiculopathy, is currently undergoing physical therapy for cervical bone spurs, neck pain.  Patient states that she is also been having episodes where she feels cold, flushed, panicky.     Nursing Notes were all reviewed and agreed with or any disagreements were addressed in the HPI.     REVIEW OF SYSTEMS        Positives and Pertinent negatives as per HPI.    PAST HISTORY     Past Medical History:  Past Medical History:   Diagnosis Date    Depression complicating pregnancy, antepartum 2012    H/O cold sores     IUGR (intrauterine growth restriction) 2012    Oligohydramnios antepartum 2012    Psychiatric problem     DEPRESSION, ANXIETY       Past Surgical History:  Past Surgical History:   Procedure Laterality Date     DELIVERY ONLY      OTHER SURGICAL HISTORY      HERNIA REPAIR (\"AS A CHILD\")    SEPTOPLASTY  06/10/2019       Family History:  Family History   Problem Relation Age of Onset    Osteoarthritis Father     Hypertension Maternal Grandmother     Diabetes Mother     Diabetes Maternal Grandfather     Hypertension Maternal Grandfather     Hypertension

## 2024-10-06 NOTE — ED TRIAGE NOTES
Pt arrived with complaint of right shoulder pain.  Pt reports right shoulder pain and not able to left right arm, pt denies injury.  Pt also reports over the last several weeks she has been having hot flashes (pt saw her pmd on 09/30/24).  Pt also complains of feeling like she is having panic attacks and they seem to be worse when she has to go to work.  Pt also reports she is stressed and anxious and years ago she was on medications for depression and anxiety but stopped taking the medication.  Pt was not able to go to work today because of the pain in her right shoulder and anxiety.  Pt is awake alert and oriented X 4, pt educated on ER flow

## 2024-10-14 ENCOUNTER — TELEMEDICINE (OUTPATIENT)
Age: 44
End: 2024-10-14
Payer: COMMERCIAL

## 2024-10-14 DIAGNOSIS — M25.511 ACUTE PAIN OF RIGHT SHOULDER: ICD-10-CM

## 2024-10-14 DIAGNOSIS — F41.9 ANXIETY AND DEPRESSION: Primary | ICD-10-CM

## 2024-10-14 DIAGNOSIS — F41.0 PANIC ANXIETY SYNDROME: ICD-10-CM

## 2024-10-14 DIAGNOSIS — F32.A ANXIETY AND DEPRESSION: Primary | ICD-10-CM

## 2024-10-14 PROCEDURE — 99214 OFFICE O/P EST MOD 30 MIN: CPT

## 2024-10-14 RX ORDER — HYDROXYZINE HYDROCHLORIDE 10 MG/1
10 TABLET, FILM COATED ORAL 3 TIMES DAILY PRN
Qty: 30 TABLET | Refills: 0 | Status: SHIPPED | OUTPATIENT
Start: 2024-10-14 | End: 2024-10-24

## 2024-10-14 ASSESSMENT — PATIENT HEALTH QUESTIONNAIRE - PHQ9
1. LITTLE INTEREST OR PLEASURE IN DOING THINGS: MORE THAN HALF THE DAYS
SUM OF ALL RESPONSES TO PHQ9 QUESTIONS 1 & 2: 4
SUM OF ALL RESPONSES TO PHQ QUESTIONS 1-9: 13
7. TROUBLE CONCENTRATING ON THINGS, SUCH AS READING THE NEWSPAPER OR WATCHING TELEVISION: MORE THAN HALF THE DAYS
8. MOVING OR SPEAKING SO SLOWLY THAT OTHER PEOPLE COULD HAVE NOTICED. OR THE OPPOSITE, BEING SO FIGETY OR RESTLESS THAT YOU HAVE BEEN MOVING AROUND A LOT MORE THAN USUAL: SEVERAL DAYS
SUM OF ALL RESPONSES TO PHQ QUESTIONS 1-9: 13
2. FEELING DOWN, DEPRESSED OR HOPELESS: MORE THAN HALF THE DAYS
3. TROUBLE FALLING OR STAYING ASLEEP: MORE THAN HALF THE DAYS
4. FEELING TIRED OR HAVING LITTLE ENERGY: MORE THAN HALF THE DAYS
SUM OF ALL RESPONSES TO PHQ QUESTIONS 1-9: 13
6. FEELING BAD ABOUT YOURSELF - OR THAT YOU ARE A FAILURE OR HAVE LET YOURSELF OR YOUR FAMILY DOWN: MORE THAN HALF THE DAYS
SUM OF ALL RESPONSES TO PHQ QUESTIONS 1-9: 13

## 2024-10-14 NOTE — PROGRESS NOTES
Lakeisha Diaz, was evaluated through a synchronous (real-time) audio-video encounter. The patient (or guardian if applicable) is aware that this is a billable service, which includes applicable co-pays. This Virtual Visit was conducted with patient's (and/or legal guardian's) consent. Patient identification was verified, and a caregiver was present when appropriate.   The patient was located at Home: [address] [place] [id number]  Provider was located at Facility (Appt Dept): [address] [place] [phone]  Confirm you are appropriately licensed, registered, or certified to deliver care in the state where the patient is located as indicated above. If you are not or unsure, please re-schedule the visit: Yes, I confirm.     Lakeisha Diaz (:  1980) is a Established patient, presenting virtually for evaluation of the following:  Chief Complaint   Patient presents with    Follow-Up from Hospital     ER      Sleep Apnea     ? If she is having the issue    Anxiety       Below is the assessment and plan developed based on review of pertinent history, physical exam, labs, studies, and medications.     Assessment & Plan  Anxiety and depression   Initiate sertraline.  Patient educated as below:    Will titrate gradually depending on the response.   Patient will be seen or communicate within a few weeks their progress.   Risks, benefits, and side effects of medications were reviewed and discussed in detail including the black box warning about the potential for increased suicide risk among adolescents treated with these medications.   Patient agreed to alert others and to seek help promptly if they would experience any intensification of their previous passive thoughts of life not being worth living.   Patient agreed that the potential benefit from a medication trial outweighs the acknowledged risks.  Patient advised that if feeling that they feel they would hurt

## 2024-10-14 NOTE — PROGRESS NOTES
\"Have you been to the ER, urgent care clinic since your last visit?  Hospitalized since your last visit?\"    YES - When: approximately 8 days ago.  Where and Why: [facility], Rt shoulder pain.    “Have you seen or consulted any other health care providers outside of [facility] since your last visit?”    NO    “Have you had a colorectal cancer screening such as a colonoscopy/FIT/Cologuard?    NO    No colonoscopy on file  No cologuard on file  No FIT/FOBT on file   No flexible sigmoidoscopy on file         “Have you had a pap smear?”    YES - [facility] yrs Nurse/CMA to request most recent records if not in the chart    No cervical cancer screening on file     Chief Complaint   Patient presents with    Follow-Up from Hospital     ER      Sleep Apnea     ? If she is having the issue    Anxiety       There were no vitals filed for this visit., none to report

## 2024-10-14 NOTE — PATIENT INSTRUCTIONS
Mental Health Providers    [facility]  Meredith Sahu, [facility]  [phone]  (VIRTUAL ONLY, adolescents & adults--specializes in trauma, anxiety, substance abuse)  [facility]  *[place]  [phone]  *[place]  [phone] Main number  *Cristal Frye  [phone]  Sanjiv Quiroz, [facility]  [phone]  Anton Hanna [facility]  [phone] (mostly kids)   Alisha Hughes [facility] and Mari Gary [facility] [phone]  [facility] --Mare Do [facility] office [phone] (all ages)  Rocio Qureshi [facility]   (limited hours Mon afternoon and some evenings)  [phone] (Ages 15 and above, mostly adults)  [facility], [facility]  [phone] (mostly kids)  [facility]-- Remigio Aponte Psy.D  [place]  [phone]  [facility]  [place] office  [phone]  Connie Chandler [facility]  [facility] office  [phone]  Harjinder Boland office  [phone] (Substance abuse specialty)  Blanche Madrid [facility] [phone]  [facility]  Cristal Frye [phone]  [facility] office:  [phone]  [place] Office: [phone]

## 2024-11-27 DIAGNOSIS — E55.9 VITAMIN D DEFICIENCY: ICD-10-CM

## 2024-11-29 RX ORDER — ERGOCALCIFEROL 1.25 MG/1
50000 CAPSULE, LIQUID FILLED ORAL WEEKLY
Qty: 12 CAPSULE | Refills: 1 | Status: SHIPPED | OUTPATIENT
Start: 2024-11-29

## 2024-12-27 ENCOUNTER — TELEPHONE (OUTPATIENT)
Age: 44
End: 2024-12-27

## 2024-12-27 ENCOUNTER — OFFICE VISIT (OUTPATIENT)
Age: 44
End: 2024-12-27
Payer: COMMERCIAL

## 2024-12-27 VITALS
OXYGEN SATURATION: 98 % | SYSTOLIC BLOOD PRESSURE: 120 MMHG | RESPIRATION RATE: 20 BRPM | DIASTOLIC BLOOD PRESSURE: 60 MMHG | HEIGHT: 66 IN | BODY MASS INDEX: 28.9 KG/M2 | TEMPERATURE: 97.8 F | HEART RATE: 102 BPM | WEIGHT: 179.8 LBS

## 2024-12-27 DIAGNOSIS — H92.03 OTALGIA OF BOTH EARS: ICD-10-CM

## 2024-12-27 DIAGNOSIS — M79.671 BILATERAL FOOT PAIN: Primary | ICD-10-CM

## 2024-12-27 DIAGNOSIS — M54.12 CERVICAL RADICULOPATHY: ICD-10-CM

## 2024-12-27 DIAGNOSIS — H60.543 DERMATITIS OF BOTH EXTERNAL AUDITORY CANALS: ICD-10-CM

## 2024-12-27 DIAGNOSIS — M79.672 BILATERAL FOOT PAIN: Primary | ICD-10-CM

## 2024-12-27 PROCEDURE — 99214 OFFICE O/P EST MOD 30 MIN: CPT | Performed by: NURSE PRACTITIONER

## 2024-12-27 RX ORDER — FLUOCINOLONE ACETONIDE 0.11 MG/ML
2 OIL AURICULAR (OTIC) 2 TIMES DAILY
Qty: 20 ML | Refills: 0 | Status: SHIPPED | OUTPATIENT
Start: 2024-12-27

## 2024-12-27 SDOH — SOCIAL STABILITY: SOCIAL INSECURITY: WITHIN THE LAST YEAR, HAVE YOU BEEN HUMILIATED OR EMOTIONALLY ABUSED IN OTHER WAYS BY YOUR PARTNER OR EX-PARTNER?: NO

## 2024-12-27 SDOH — ECONOMIC STABILITY: FOOD INSECURITY: WITHIN THE PAST 12 MONTHS, YOU WORRIED THAT YOUR FOOD WOULD RUN OUT BEFORE YOU GOT MONEY TO BUY MORE.: NEVER TRUE

## 2024-12-27 SDOH — SOCIAL STABILITY: SOCIAL INSECURITY
WITHIN THE LAST YEAR, HAVE TO BEEN RAPED OR FORCED TO HAVE ANY KIND OF SEXUAL ACTIVITY BY YOUR PARTNER OR EX-PARTNER?: NO

## 2024-12-27 SDOH — HEALTH STABILITY: MENTAL HEALTH: HOW MANY STANDARD DRINKS CONTAINING ALCOHOL DO YOU HAVE ON A TYPICAL DAY?: 1 OR 2

## 2024-12-27 SDOH — HEALTH STABILITY: MENTAL HEALTH
STRESS IS WHEN SOMEONE FEELS TENSE, NERVOUS, ANXIOUS, OR CAN'T SLEEP AT NIGHT BECAUSE THEIR MIND IS TROUBLED. HOW STRESSED ARE YOU?: TO SOME EXTENT

## 2024-12-27 SDOH — SOCIAL STABILITY: SOCIAL NETWORK
DO YOU BELONG TO ANY CLUBS OR ORGANIZATIONS SUCH AS CHURCH GROUPS UNIONS, FRATERNAL OR ATHLETIC GROUPS, OR SCHOOL GROUPS?: NO

## 2024-12-27 SDOH — SOCIAL STABILITY: SOCIAL NETWORK: ARE YOU MARRIED, WIDOWED, DIVORCED, SEPARATED, NEVER MARRIED, OR LIVING WITH A PARTNER?: MARRIED

## 2024-12-27 SDOH — ECONOMIC STABILITY: FOOD INSECURITY: WITHIN THE PAST 12 MONTHS, THE FOOD YOU BOUGHT JUST DIDN'T LAST AND YOU DIDN'T HAVE MONEY TO GET MORE.: NEVER TRUE

## 2024-12-27 SDOH — HEALTH STABILITY: PHYSICAL HEALTH: ON AVERAGE, HOW MANY DAYS PER WEEK DO YOU ENGAGE IN MODERATE TO STRENUOUS EXERCISE (LIKE A BRISK WALK)?: 7 DAYS

## 2024-12-27 SDOH — HEALTH STABILITY: PHYSICAL HEALTH: ON AVERAGE, HOW MANY MINUTES DO YOU ENGAGE IN EXERCISE AT THIS LEVEL?: 150+ MIN

## 2024-12-27 SDOH — SOCIAL STABILITY: SOCIAL NETWORK
IN A TYPICAL WEEK, HOW MANY TIMES DO YOU TALK ON THE PHONE WITH FAMILY, FRIENDS, OR NEIGHBORS?: MORE THAN THREE TIMES A WEEK

## 2024-12-27 SDOH — ECONOMIC STABILITY: INCOME INSECURITY: HOW HARD IS IT FOR YOU TO PAY FOR THE VERY BASICS LIKE FOOD, HOUSING, MEDICAL CARE, AND HEATING?: NOT HARD AT ALL

## 2024-12-27 SDOH — SOCIAL STABILITY: SOCIAL INSECURITY
WITHIN THE LAST YEAR, HAVE YOU BEEN KICKED, HIT, SLAPPED, OR OTHERWISE PHYSICALLY HURT BY YOUR PARTNER OR EX-PARTNER?: NO

## 2024-12-27 SDOH — HEALTH STABILITY: MENTAL HEALTH: HOW OFTEN DO YOU HAVE A DRINK CONTAINING ALCOHOL?: MONTHLY OR LESS

## 2024-12-27 SDOH — SOCIAL STABILITY: SOCIAL INSECURITY: WITHIN THE LAST YEAR, HAVE YOU BEEN AFRAID OF YOUR PARTNER OR EX-PARTNER?: NO

## 2024-12-27 SDOH — ECONOMIC STABILITY: INCOME INSECURITY: IN THE LAST 12 MONTHS, WAS THERE A TIME WHEN YOU WERE NOT ABLE TO PAY THE MORTGAGE OR RENT ON TIME?: NO

## 2024-12-27 SDOH — SOCIAL STABILITY: SOCIAL NETWORK: HOW OFTEN DO YOU ATTENT MEETINGS OF THE CLUB OR ORGANIZATION YOU BELONG TO?: NEVER

## 2024-12-27 SDOH — SOCIAL STABILITY: SOCIAL NETWORK: HOW OFTEN DO YOU ATTEND CHURCH OR RELIGIOUS SERVICES?: NEVER

## 2024-12-27 SDOH — SOCIAL STABILITY: SOCIAL NETWORK: HOW OFTEN DO YOU GET TOGETHER WITH FRIENDS OR RELATIVES?: MORE THAN THREE TIMES A WEEK

## 2024-12-27 ASSESSMENT — PATIENT HEALTH QUESTIONNAIRE - PHQ9
1. LITTLE INTEREST OR PLEASURE IN DOING THINGS: NOT AT ALL
7. TROUBLE CONCENTRATING ON THINGS, SUCH AS READING THE NEWSPAPER OR WATCHING TELEVISION: NOT AT ALL
10. IF YOU CHECKED OFF ANY PROBLEMS, HOW DIFFICULT HAVE THESE PROBLEMS MADE IT FOR YOU TO DO YOUR WORK, TAKE CARE OF THINGS AT HOME, OR GET ALONG WITH OTHER PEOPLE: NOT DIFFICULT AT ALL
SUM OF ALL RESPONSES TO PHQ QUESTIONS 1-9: 0
SUM OF ALL RESPONSES TO PHQ9 QUESTIONS 1 & 2: 0
2. FEELING DOWN, DEPRESSED OR HOPELESS: NOT AT ALL
8. MOVING OR SPEAKING SO SLOWLY THAT OTHER PEOPLE COULD HAVE NOTICED. OR THE OPPOSITE, BEING SO FIGETY OR RESTLESS THAT YOU HAVE BEEN MOVING AROUND A LOT MORE THAN USUAL: NOT AT ALL
SUM OF ALL RESPONSES TO PHQ QUESTIONS 1-9: 0
9. THOUGHTS THAT YOU WOULD BE BETTER OFF DEAD, OR OF HURTING YOURSELF: NOT AT ALL
4. FEELING TIRED OR HAVING LITTLE ENERGY: NOT AT ALL
SUM OF ALL RESPONSES TO PHQ QUESTIONS 1-9: 0
3. TROUBLE FALLING OR STAYING ASLEEP: NOT AT ALL
6. FEELING BAD ABOUT YOURSELF - OR THAT YOU ARE A FAILURE OR HAVE LET YOURSELF OR YOUR FAMILY DOWN: NOT AT ALL
SUM OF ALL RESPONSES TO PHQ QUESTIONS 1-9: 0
5. POOR APPETITE OR OVEREATING: NOT AT ALL

## 2024-12-27 NOTE — PROGRESS NOTES
Chief Complaint   Patient presents with    Ear Pain       ASSESSMENT AND PLAN:      Diagnosis Orders   1. Bilateral foot pain  External Referral To Podiatry    fluocinolone (DERMOTIC) 0.01 % OIL oil      2. Otalgia of both ears  fluocinolone (DERMOTIC) 0.01 % OIL oil    External Referral To ENT      3. Cervical radiculopathy  EMG      4. Dermatitis of both external auditory canals          Chronic bilateral eustachian tube dysfunction. Refer to ENT. No improvement with steroids, abx, topicals, decongestants, anti-histamines in the past. EMG of RUE to eval for nerve impingement. She's completed PT with improvement but no resolution. Refer to podiatry for foot pain.     Patient aware of plan of care and verbalized understanding. Questions answered. RTC 1 month to follow up.    HPI:     is a 44 y.o. female in today for an acute visit with a CC of chronic bilateral ear pressure. She was dx with AOM at Urgent Care on 11/17/24 and tx with Augmentin. She has a hx of allergic rhinitis, nasal congestion x years, has seen ENT, underwent nasal septum surgery in 2017 and immunotherapy without improvement. Previously followed by ENT Dr. Coffman and allergist Dr. Lassiter.     She also notes chronic foot pain on the tops of both feet, worse when flexing her great toe. She denies injury. Pain occurs mostly when she is barefoot.     She also notes intermittent RUE numbness and tingling that starts in her neck and shoulder. It comes and goes. Dx with cervical radiculopathy, DDD seen on imaging in September from the ER. She wants to see a neurologist.     Allergies   Allergen Reactions    Azithromycin Swelling     z pack       Prior to Visit Medications    Medication Sig Taking? Authorizing Provider   fluocinolone (DERMOTIC) 0.01 % OIL oil Place 2 drops in ear(s) 2 times daily Yes Valeria Rey, APRN - NP   vitamin D (ERGOCALCIFEROL) 1.25 MG (52670 UT) CAPS capsule TAKE 1 CAPSULE BY MOUTH 1 TIME A WEEK Yes

## 2025-01-12 DIAGNOSIS — F32.A ANXIETY AND DEPRESSION: ICD-10-CM

## 2025-01-12 DIAGNOSIS — F41.9 ANXIETY AND DEPRESSION: ICD-10-CM

## 2025-01-31 ENCOUNTER — CLINICAL DOCUMENTATION (OUTPATIENT)
Age: 45
End: 2025-01-31

## 2025-01-31 NOTE — PROGRESS NOTES
Received note from [facility], unable to get a hold of patient after several attempts. Patient can call them directly at [phone] if she desires an EMG as ordered.

## 2025-03-04 DIAGNOSIS — M79.671 BILATERAL FOOT PAIN: ICD-10-CM

## 2025-03-04 DIAGNOSIS — H92.03 OTALGIA OF BOTH EARS: ICD-10-CM

## 2025-03-04 DIAGNOSIS — M79.672 BILATERAL FOOT PAIN: ICD-10-CM

## 2025-03-04 RX ORDER — FLUOCINOLONE ACETONIDE 0.11 MG/ML
2 OIL AURICULAR (OTIC) 2 TIMES DAILY
Qty: 20 ML | Refills: 0 | Status: SHIPPED | OUTPATIENT
Start: 2025-03-04

## 2025-03-10 ENCOUNTER — OFFICE VISIT (OUTPATIENT)
Age: 45
End: 2025-03-10
Payer: COMMERCIAL

## 2025-03-10 VITALS
DIASTOLIC BLOOD PRESSURE: 80 MMHG | HEART RATE: 97 BPM | SYSTOLIC BLOOD PRESSURE: 136 MMHG | BODY MASS INDEX: 28.08 KG/M2 | RESPIRATION RATE: 22 BRPM | TEMPERATURE: 97.7 F | WEIGHT: 174 LBS | OXYGEN SATURATION: 99 %

## 2025-03-10 DIAGNOSIS — J30.89 ENVIRONMENTAL AND SEASONAL ALLERGIES: ICD-10-CM

## 2025-03-10 PROCEDURE — 99213 OFFICE O/P EST LOW 20 MIN: CPT

## 2025-03-10 RX ORDER — LEVOCETIRIZINE DIHYDROCHLORIDE 5 MG/1
5 TABLET, FILM COATED ORAL NIGHTLY
Qty: 90 TABLET | Refills: 0 | Status: SHIPPED | OUTPATIENT
Start: 2025-03-10

## 2025-03-10 RX ORDER — FLUTICASONE PROPIONATE 50 MCG
2 SPRAY, SUSPENSION (ML) NASAL DAILY
Qty: 16 G | Refills: 1 | Status: SHIPPED | OUTPATIENT
Start: 2025-03-10

## 2025-03-10 SDOH — ECONOMIC STABILITY: FOOD INSECURITY: WITHIN THE PAST 12 MONTHS, THE FOOD YOU BOUGHT JUST DIDN'T LAST AND YOU DIDN'T HAVE MONEY TO GET MORE.: NEVER TRUE

## 2025-03-10 SDOH — ECONOMIC STABILITY: FOOD INSECURITY: WITHIN THE PAST 12 MONTHS, YOU WORRIED THAT YOUR FOOD WOULD RUN OUT BEFORE YOU GOT MONEY TO BUY MORE.: NEVER TRUE

## 2025-03-10 ASSESSMENT — PATIENT HEALTH QUESTIONNAIRE - PHQ9
10. IF YOU CHECKED OFF ANY PROBLEMS, HOW DIFFICULT HAVE THESE PROBLEMS MADE IT FOR YOU TO DO YOUR WORK, TAKE CARE OF THINGS AT HOME, OR GET ALONG WITH OTHER PEOPLE: VERY DIFFICULT
SUM OF ALL RESPONSES TO PHQ QUESTIONS 1-9: 6
SUM OF ALL RESPONSES TO PHQ QUESTIONS 1-9: 6
8. MOVING OR SPEAKING SO SLOWLY THAT OTHER PEOPLE COULD HAVE NOTICED. OR THE OPPOSITE, BEING SO FIGETY OR RESTLESS THAT YOU HAVE BEEN MOVING AROUND A LOT MORE THAN USUAL: NOT AT ALL
7. TROUBLE CONCENTRATING ON THINGS, SUCH AS READING THE NEWSPAPER OR WATCHING TELEVISION: NEARLY EVERY DAY
1. LITTLE INTEREST OR PLEASURE IN DOING THINGS: NOT AT ALL
3. TROUBLE FALLING OR STAYING ASLEEP: NOT AT ALL
5. POOR APPETITE OR OVEREATING: NOT AT ALL
6. FEELING BAD ABOUT YOURSELF - OR THAT YOU ARE A FAILURE OR HAVE LET YOURSELF OR YOUR FAMILY DOWN: NEARLY EVERY DAY
9. THOUGHTS THAT YOU WOULD BE BETTER OFF DEAD, OR OF HURTING YOURSELF: NOT AT ALL
SUM OF ALL RESPONSES TO PHQ QUESTIONS 1-9: 6
4. FEELING TIRED OR HAVING LITTLE ENERGY: NOT AT ALL
SUM OF ALL RESPONSES TO PHQ QUESTIONS 1-9: 6
2. FEELING DOWN, DEPRESSED OR HOPELESS: NOT AT ALL

## 2025-03-10 ASSESSMENT — ENCOUNTER SYMPTOMS
RHINORRHEA: 1
WHEEZING: 0
COUGH: 1
SHORTNESS OF BREATH: 0
EYE DISCHARGE: 1
SORE THROAT: 1

## 2025-03-10 NOTE — ASSESSMENT & PLAN NOTE
Recommend daily antihistamine, as well as Flonase; limit environmental exposure as much as possible.  Orders:    fluticasone (FLONASE) 50 MCG/ACT nasal spray; 2 sprays by Nasal route daily    levocetirizine (XYZAL) 5 MG tablet; Take 1 tablet by mouth nightly

## 2025-03-10 NOTE — PROGRESS NOTES
Chief Complaint   Patient presents with    Cough     Sore throat, watery/burning eyes, nasal congestion, weak x 6 days     Requests work note for past 2 days        HPI:     is a 44 y.o. female who presents for an acute visit.      She endorses sore throat, watery eyes, nasal congestion that began about a week ago; has been using OTC cough/cold medication with minimal relief.  Denies fever, chills, SOB, wheezing, headache.    Allergies   Allergen Reactions    Azithromycin Swelling     z pack       Current Outpatient Medications   Medication Sig Dispense Refill    fluticasone (FLONASE) 50 MCG/ACT nasal spray 2 sprays by Nasal route daily 16 g 1    levocetirizine (XYZAL) 5 MG tablet Take 1 tablet by mouth nightly 90 tablet 0    fluocinolone (DERMOTIC) 0.01 % OIL oil Place 2 drops in ear(s) 2 times daily 20 mL 0    sertraline (ZOLOFT) 50 MG tablet TAKE 1 TABLET BY MOUTH DAILY 90 tablet 1    vitamin D (ERGOCALCIFEROL) 1.25 MG (75462 UT) CAPS capsule TAKE 1 CAPSULE BY MOUTH 1 TIME A WEEK 12 capsule 1    valACYclovir (VALTREX) 1 g tablet TAKE 2 TABLETS BY MOUTH INITIALLY THEN TAKE 2 TABLETS EVERY 12 HOURS AS NEEDED FOR LESIONS(COLD SORE) 24 tablet 0     No current facility-administered medications for this visit.         Past Medical History:   Diagnosis Date    Depression complicating pregnancy, antepartum 9/12/2012    H/O cold sores     IUGR (intrauterine growth restriction) 9/12/2012    Oligohydramnios antepartum 9/12/2012    Psychiatric problem     DEPRESSION, ANXIETY       Family History   Problem Relation Age of Onset    Osteoarthritis Father     Hypertension Maternal Grandmother     Diabetes Mother     Diabetes Maternal Grandfather     Hypertension Maternal Grandfather     Hypertension Mother     Diabetes Maternal Grandmother        Review of Systems   Constitutional:  Negative for chills, fatigue and fever.   HENT:  Positive for congestion, postnasal drip, rhinorrhea, sneezing and sore throat.    Eyes:

## 2025-03-10 NOTE — PROGRESS NOTES
\"Have you been to the ER, urgent care clinic since your last visit?  Hospitalized since your last visit?\"    NO    “Have you seen or consulted any other health care providers outside our system since your last visit?”    NO     “Have you had a pap smear?”    NO    No cervical cancer screening on file       “Have you had a colorectal cancer screening such as a colonoscopy/FIT/Cologuard?    NO    No colonoscopy on file  No cologuard on file  No FIT/FOBT on file   No flexible sigmoidoscopy on file     Chief Complaint   Patient presents with    Cough     Sore throat, watery/burning eyes, nasal congestion, weak x 6 days     Requests work note for past 2 days        Vitals:    03/10/25 1127   BP: 136/80   Pulse: 97   Resp: 22   Temp: 97.7 °F (36.5 °C)   SpO2: 99%

## 2025-03-24 ENCOUNTER — OFFICE VISIT (OUTPATIENT)
Age: 45
End: 2025-03-24
Payer: COMMERCIAL

## 2025-03-24 VITALS
SYSTOLIC BLOOD PRESSURE: 130 MMHG | TEMPERATURE: 98.6 F | DIASTOLIC BLOOD PRESSURE: 60 MMHG | HEIGHT: 66 IN | RESPIRATION RATE: 20 BRPM | BODY MASS INDEX: 27.74 KG/M2 | WEIGHT: 172.6 LBS | OXYGEN SATURATION: 97 % | HEART RATE: 106 BPM

## 2025-03-24 DIAGNOSIS — F41.1 GAD (GENERALIZED ANXIETY DISORDER): ICD-10-CM

## 2025-03-24 DIAGNOSIS — G47.33 OSA (OBSTRUCTIVE SLEEP APNEA): ICD-10-CM

## 2025-03-24 DIAGNOSIS — Z00.00 WELLNESS EXAMINATION: Primary | ICD-10-CM

## 2025-03-24 DIAGNOSIS — R53.82 CHRONIC FATIGUE: ICD-10-CM

## 2025-03-24 DIAGNOSIS — F33.9 RECURRENT DEPRESSION: ICD-10-CM

## 2025-03-24 DIAGNOSIS — E55.9 VITAMIN D DEFICIENCY: ICD-10-CM

## 2025-03-24 PROCEDURE — 99396 PREV VISIT EST AGE 40-64: CPT | Performed by: NURSE PRACTITIONER

## 2025-03-24 PROCEDURE — 36415 COLL VENOUS BLD VENIPUNCTURE: CPT | Performed by: NURSE PRACTITIONER

## 2025-03-24 RX ORDER — SERTRALINE HYDROCHLORIDE 100 MG/1
100 TABLET, FILM COATED ORAL DAILY
Qty: 90 TABLET | Refills: 1 | Status: SHIPPED | OUTPATIENT
Start: 2025-03-24

## 2025-03-24 ASSESSMENT — PATIENT HEALTH QUESTIONNAIRE - PHQ9
6. FEELING BAD ABOUT YOURSELF - OR THAT YOU ARE A FAILURE OR HAVE LET YOURSELF OR YOUR FAMILY DOWN: NOT AT ALL
SUM OF ALL RESPONSES TO PHQ QUESTIONS 1-9: 0
SUM OF ALL RESPONSES TO PHQ QUESTIONS 1-9: 0
9. THOUGHTS THAT YOU WOULD BE BETTER OFF DEAD, OR OF HURTING YOURSELF: NOT AT ALL
SUM OF ALL RESPONSES TO PHQ QUESTIONS 1-9: 0
4. FEELING TIRED OR HAVING LITTLE ENERGY: NOT AT ALL
1. LITTLE INTEREST OR PLEASURE IN DOING THINGS: NOT AT ALL
10. IF YOU CHECKED OFF ANY PROBLEMS, HOW DIFFICULT HAVE THESE PROBLEMS MADE IT FOR YOU TO DO YOUR WORK, TAKE CARE OF THINGS AT HOME, OR GET ALONG WITH OTHER PEOPLE: NOT DIFFICULT AT ALL
3. TROUBLE FALLING OR STAYING ASLEEP: NOT AT ALL
7. TROUBLE CONCENTRATING ON THINGS, SUCH AS READING THE NEWSPAPER OR WATCHING TELEVISION: NOT AT ALL
SUM OF ALL RESPONSES TO PHQ QUESTIONS 1-9: 0
8. MOVING OR SPEAKING SO SLOWLY THAT OTHER PEOPLE COULD HAVE NOTICED. OR THE OPPOSITE, BEING SO FIGETY OR RESTLESS THAT YOU HAVE BEEN MOVING AROUND A LOT MORE THAN USUAL: NOT AT ALL
5. POOR APPETITE OR OVEREATING: NOT AT ALL
2. FEELING DOWN, DEPRESSED OR HOPELESS: NOT AT ALL

## 2025-03-24 NOTE — PROGRESS NOTES
\"Have you been to the ER, urgent care clinic since your last visit?  Hospitalized since your last visit?\"    NO    “Have you seen or consulted any other health care providers outside our system since your last visit?”    NO     “Have you had a pap smear?”    NO    No cervical cancer screening on file       “Have you had a colorectal cancer screening such as a colonoscopy/FIT/Cologuard?    NO    No colonoscopy on file  No cologuard on file  No FIT/FOBT on file   No flexible sigmoidoscopy on file

## 2025-03-24 NOTE — PROGRESS NOTES
Chief Complaint   Patient presents with    Fatigue       ASSESSMENT AND PLAN:       1. Wellness examination  =  - COLLECTION VENOUS BLOOD,VENIPUNCTURE  - CBC with Auto Differential; Future  - Comprehensive Metabolic Panel; Future  - Lipid Panel; Future  - Vitamin D 25 Hydroxy; Future  - TSH; Future  - Hemoglobin A1C; Future  - Vitamin B12 & Folate; Future  - sertraline (ZOLOFT) 100 MG tablet; Take 1 tablet by mouth daily  Dispense: 90 tablet; Refill: 1  - Magnesium; Future  - Magnesium  - Vitamin B12 & Folate  - Hemoglobin A1C  - TSH  - Vitamin D 25 Hydroxy  - Lipid Panel  - Comprehensive Metabolic Panel  - CBC with Auto Differential    2. ALISA (obstructive sleep apnea)      3. Vitamin D deficiency    - COLLECTION VENOUS BLOOD,VENIPUNCTURE  - Vitamin D 25 Hydroxy; Future  - Vitamin D 25 Hydroxy    4. Chronic fatigue  =  - COLLECTION VENOUS BLOOD,VENIPUNCTURE  - CBC with Auto Differential; Future  - Comprehensive Metabolic Panel; Future  - Vitamin B12 & Folate; Future  - Magnesium; Future  - Magnesium  - Vitamin B12 & Folate  - Comprehensive Metabolic Panel  - CBC with Auto Differential    5. Recurrent depression    - sertraline (ZOLOFT) 100 MG tablet; Take 1 tablet by mouth daily  Dispense: 90 tablet; Refill: 1    6. SARAH (generalized anxiety disorder)    - sertraline (ZOLOFT) 100 MG tablet; Take 1 tablet by mouth daily  Dispense: 90 tablet; Refill: 1     Increase the sertraline from 50 mg to 100 mg for uncontrolled anxiety. STOP BANG score of 3. Home sleep study test ordered. Check up labs today. She needs closer follow up. Nursing to get her mammogram and pap results. She declines referral for CRC screening. We will re-visit this.     Patient aware of plan of care and verbalized understanding. Questions answered. RTC 1 month, or sooner if needed.    HPI:     is a 44 y.o. female in today for an acute visit with a CC of fatigue, no energy. She feels like her anxiety is poorly controlled. She wants to sleep

## 2025-03-25 ENCOUNTER — RESULTS FOLLOW-UP (OUTPATIENT)
Age: 45
End: 2025-03-25

## 2025-03-25 LAB
25(OH)D3 SERPL-MCNC: 48.1 NG/ML (ref 30–100)
ALBUMIN SERPL-MCNC: 3.5 G/DL (ref 3.5–5)
ALBUMIN/GLOB SERPL: 0.8 (ref 1.1–2.2)
ALP SERPL-CCNC: 145 U/L (ref 45–117)
ALT SERPL-CCNC: 13 U/L (ref 12–78)
ANION GAP SERPL CALC-SCNC: 7 MMOL/L (ref 2–12)
AST SERPL-CCNC: 17 U/L (ref 15–37)
BASOPHILS # BLD: 0.08 K/UL (ref 0–0.1)
BASOPHILS NFR BLD: 1 % (ref 0–1)
BILIRUB SERPL-MCNC: 0.7 MG/DL (ref 0.2–1)
BUN SERPL-MCNC: 11 MG/DL (ref 6–20)
BUN/CREAT SERPL: 14 (ref 12–20)
CALCIUM SERPL-MCNC: 9.4 MG/DL (ref 8.5–10.1)
CHLORIDE SERPL-SCNC: 104 MMOL/L (ref 97–108)
CHOLEST SERPL-MCNC: 157 MG/DL
CO2 SERPL-SCNC: 27 MMOL/L (ref 21–32)
CREAT SERPL-MCNC: 0.77 MG/DL (ref 0.55–1.02)
DIFFERENTIAL METHOD BLD: ABNORMAL
EOSINOPHIL # BLD: 0.9 K/UL (ref 0–0.4)
EOSINOPHIL NFR BLD: 11.6 % (ref 0–7)
ERYTHROCYTE [DISTWIDTH] IN BLOOD BY AUTOMATED COUNT: 16.1 % (ref 11.5–14.5)
EST. AVERAGE GLUCOSE BLD GHB EST-MCNC: 111 MG/DL
FOLATE SERPL-MCNC: 9.7 NG/ML (ref 5–21)
GLOBULIN SER CALC-MCNC: 4.2 G/DL (ref 2–4)
GLUCOSE SERPL-MCNC: 98 MG/DL (ref 65–100)
HBA1C MFR BLD: 5.5 % (ref 4–5.6)
HCT VFR BLD AUTO: 41.3 % (ref 35–47)
HDLC SERPL-MCNC: 65 MG/DL
HDLC SERPL: 2.4 (ref 0–5)
HGB BLD-MCNC: 12.5 G/DL (ref 11.5–16)
IMM GRANULOCYTES # BLD AUTO: 0.02 K/UL (ref 0–0.04)
IMM GRANULOCYTES NFR BLD AUTO: 0.3 % (ref 0–0.5)
LDLC SERPL CALC-MCNC: 77.8 MG/DL (ref 0–100)
LYMPHOCYTES # BLD: 2.56 K/UL (ref 0.8–3.5)
LYMPHOCYTES NFR BLD: 33.1 % (ref 12–49)
MAGNESIUM SERPL-MCNC: 2.1 MG/DL (ref 1.6–2.4)
MCH RBC QN AUTO: 24.5 PG (ref 26–34)
MCHC RBC AUTO-ENTMCNC: 30.3 G/DL (ref 30–36.5)
MCV RBC AUTO: 80.8 FL (ref 80–99)
MONOCYTES # BLD: 0.52 K/UL (ref 0–1)
MONOCYTES NFR BLD: 6.7 % (ref 5–13)
NEUTS SEG # BLD: 3.65 K/UL (ref 1.8–8)
NEUTS SEG NFR BLD: 47.3 % (ref 32–75)
NRBC # BLD: 0 K/UL (ref 0–0.01)
NRBC BLD-RTO: 0 PER 100 WBC
PLATELET # BLD AUTO: 360 K/UL (ref 150–400)
PMV BLD AUTO: 10.5 FL (ref 8.9–12.9)
POTASSIUM SERPL-SCNC: 3.9 MMOL/L (ref 3.5–5.1)
PROT SERPL-MCNC: 7.7 G/DL (ref 6.4–8.2)
RBC # BLD AUTO: 5.11 M/UL (ref 3.8–5.2)
SODIUM SERPL-SCNC: 138 MMOL/L (ref 136–145)
TRIGL SERPL-MCNC: 71 MG/DL
TSH SERPL DL<=0.05 MIU/L-ACNC: 1.66 UIU/ML (ref 0.36–3.74)
VIT B12 SERPL-MCNC: 411 PG/ML (ref 193–986)
VLDLC SERPL CALC-MCNC: 14.2 MG/DL
WBC # BLD AUTO: 7.7 K/UL (ref 3.6–11)

## 2025-03-28 ENCOUNTER — PATIENT MESSAGE (OUTPATIENT)
Age: 45
End: 2025-03-28

## 2025-03-28 RX ORDER — VALACYCLOVIR HYDROCHLORIDE 1 G/1
TABLET, FILM COATED ORAL
Qty: 24 TABLET | Refills: 0 | Status: SHIPPED | OUTPATIENT
Start: 2025-03-28

## 2025-04-01 DIAGNOSIS — G47.33 OSA (OBSTRUCTIVE SLEEP APNEA): Primary | ICD-10-CM

## 2025-04-25 ENCOUNTER — OFFICE VISIT (OUTPATIENT)
Age: 45
End: 2025-04-25
Payer: COMMERCIAL

## 2025-04-25 VITALS
BODY MASS INDEX: 27.74 KG/M2 | TEMPERATURE: 98.7 F | OXYGEN SATURATION: 96 % | SYSTOLIC BLOOD PRESSURE: 138 MMHG | WEIGHT: 172.6 LBS | DIASTOLIC BLOOD PRESSURE: 78 MMHG | HEART RATE: 110 BPM | RESPIRATION RATE: 20 BRPM | HEIGHT: 66 IN

## 2025-04-25 DIAGNOSIS — G47.33 OSA (OBSTRUCTIVE SLEEP APNEA): ICD-10-CM

## 2025-04-25 DIAGNOSIS — R53.82 CHRONIC FATIGUE: ICD-10-CM

## 2025-04-25 DIAGNOSIS — F33.9 RECURRENT DEPRESSION: ICD-10-CM

## 2025-04-25 DIAGNOSIS — Z63.6 CAREGIVER BURDEN: Primary | ICD-10-CM

## 2025-04-25 PROCEDURE — 99214 OFFICE O/P EST MOD 30 MIN: CPT | Performed by: NURSE PRACTITIONER

## 2025-04-25 SDOH — SOCIAL STABILITY - SOCIAL INSECURITY: DEPENDENT RELATIVE NEEDING CARE AT HOME: Z63.6

## 2025-04-25 ASSESSMENT — PATIENT HEALTH QUESTIONNAIRE - PHQ9
SUM OF ALL RESPONSES TO PHQ QUESTIONS 1-9: 0
8. MOVING OR SPEAKING SO SLOWLY THAT OTHER PEOPLE COULD HAVE NOTICED. OR THE OPPOSITE, BEING SO FIGETY OR RESTLESS THAT YOU HAVE BEEN MOVING AROUND A LOT MORE THAN USUAL: NOT AT ALL
SUM OF ALL RESPONSES TO PHQ QUESTIONS 1-9: 0
9. THOUGHTS THAT YOU WOULD BE BETTER OFF DEAD, OR OF HURTING YOURSELF: NOT AT ALL
4. FEELING TIRED OR HAVING LITTLE ENERGY: NOT AT ALL
10. IF YOU CHECKED OFF ANY PROBLEMS, HOW DIFFICULT HAVE THESE PROBLEMS MADE IT FOR YOU TO DO YOUR WORK, TAKE CARE OF THINGS AT HOME, OR GET ALONG WITH OTHER PEOPLE: NOT DIFFICULT AT ALL
5. POOR APPETITE OR OVEREATING: NOT AT ALL
3. TROUBLE FALLING OR STAYING ASLEEP: NOT AT ALL
1. LITTLE INTEREST OR PLEASURE IN DOING THINGS: NOT AT ALL
6. FEELING BAD ABOUT YOURSELF - OR THAT YOU ARE A FAILURE OR HAVE LET YOURSELF OR YOUR FAMILY DOWN: NOT AT ALL
SUM OF ALL RESPONSES TO PHQ QUESTIONS 1-9: 0
SUM OF ALL RESPONSES TO PHQ QUESTIONS 1-9: 0
7. TROUBLE CONCENTRATING ON THINGS, SUCH AS READING THE NEWSPAPER OR WATCHING TELEVISION: NOT AT ALL
2. FEELING DOWN, DEPRESSED OR HOPELESS: NOT AT ALL

## 2025-04-25 NOTE — PROGRESS NOTES
Have you been to the ER, urgent care clinic since your last visit?  Hospitalized since your last visit?   NO    Have you seen or consulted any other health care providers outside our system since your last visit?   NO     “Have you had a pap smear?”    NO    Date of last Cervical Cancer screen (HPV or PAP): 3/25/2021       “Have you had a colorectal cancer screening such as a colonoscopy/FIT/Cologuard?    NO    No colonoscopy on file  No cologuard on file  No FIT/FOBT on file   No flexible sigmoidoscopy on file

## 2025-04-25 NOTE — PROGRESS NOTES
Chief Complaint   Patient presents with    Fatigue       ASSESSMENT AND PLAN:       1. Caregiver burden      2. Chronic fatigue      3. Recurrent depression      4. ALISA (obstructive sleep apnea)         Assessment & Plan  1. Severe Sleep Apnea.  - Diagnosed with severe sleep apnea based on a home sleep study.  - Referral to sleep medicine has been made, but no appointment has been scheduled yet.  - Advised that certain fluids can increase urination frequency; medications are not contributing to this issue.  - Instructed to schedule an appointment with a sleep specialist as soon as possible; contact information for the sleep medicine department will be provided.    2. Anxiety.  - Reports feeling better mentally since the sertraline dosage was increased from 50 mg to 100 mg.  - Takes sertraline daily before work, despite it causing some tiredness, as it helps manage anxiety.  - Advised to continue the current sertraline regimen.    3. Stress and Fatigue.  - Experiencing significant stress and fatigue due to her mother's critical illness and her own health issues.  - Advised to consider taking a leave of absence from work for 1 to 2 weeks to manage personal health and family responsibilities.  - Encouraged to discuss the possibility of Family and Medical Leave Act (FMLA) with her Human Resources department.    Follow-up  - Follow-up appointment scheduled in 1 to 2 months.      Patient aware of plan of care and verbalized understanding. Questions answered. RTC 1-2 months, or sooner if needed.    HPI:      History of Present Illness  The patient is a 44-year-old female presenting today for a 1 month follow-up. She was last seen in 03/2025 for her yearly checkup, during which she reported experiencing fatigue, lack of energy, and excessive sleepiness. At that time, her sertraline dosage was increased from 50 mg to 100 mg. A home sleep study test was ordered, which returned positive for severe sleep apnea, leading to a

## 2025-05-19 ENCOUNTER — TELEMEDICINE (OUTPATIENT)
Age: 45
End: 2025-05-19
Payer: COMMERCIAL

## 2025-05-19 DIAGNOSIS — F33.9 RECURRENT DEPRESSION: ICD-10-CM

## 2025-05-19 DIAGNOSIS — F41.1 GAD (GENERALIZED ANXIETY DISORDER): ICD-10-CM

## 2025-05-19 DIAGNOSIS — F43.21 GRIEF: Primary | ICD-10-CM

## 2025-05-19 PROCEDURE — 99214 OFFICE O/P EST MOD 30 MIN: CPT | Performed by: NURSE PRACTITIONER

## 2025-05-19 RX ORDER — SERTRALINE HYDROCHLORIDE 100 MG/1
TABLET, FILM COATED ORAL
Qty: 135 TABLET | Refills: 3 | Status: SHIPPED | OUTPATIENT
Start: 2025-05-19

## 2025-05-19 SDOH — SOCIAL STABILITY: SOCIAL INSECURITY: WITHIN THE LAST YEAR, HAVE YOU BEEN AFRAID OF YOUR PARTNER OR EX-PARTNER?: NO

## 2025-05-19 SDOH — SOCIAL STABILITY: SOCIAL INSECURITY: ARE YOU MARRIED, WIDOWED, DIVORCED, SEPARATED, NEVER MARRIED, OR LIVING WITH A PARTNER?: MARRIED

## 2025-05-19 SDOH — HEALTH STABILITY: MENTAL HEALTH: HOW OFTEN DO YOU HAVE A DRINK CONTAINING ALCOHOL?: MONTHLY OR LESS

## 2025-05-19 SDOH — ECONOMIC STABILITY: TRANSPORTATION INSECURITY: IN THE PAST 12 MONTHS, HAS LACK OF TRANSPORTATION KEPT YOU FROM MEDICAL APPOINTMENTS OR FROM GETTING MEDICATIONS?: NO

## 2025-05-19 SDOH — SOCIAL STABILITY: SOCIAL NETWORK
DO YOU BELONG TO ANY CLUBS OR ORGANIZATIONS SUCH AS CHURCH GROUPS, UNIONS, FRATERNAL OR ATHLETIC GROUPS, OR SCHOOL GROUPS?: NO

## 2025-05-19 SDOH — ECONOMIC STABILITY: FOOD INSECURITY: WITHIN THE PAST 12 MONTHS, THE FOOD YOU BOUGHT JUST DIDN'T LAST AND YOU DIDN'T HAVE MONEY TO GET MORE.: NEVER TRUE

## 2025-05-19 SDOH — SOCIAL STABILITY: SOCIAL NETWORK: HOW OFTEN DO YOU ATTEND MEETINGS OF THE CLUBS OR ORGANIZATIONS YOU BELONG TO?: NEVER

## 2025-05-19 SDOH — ECONOMIC STABILITY: HOUSING INSECURITY: IN THE LAST 12 MONTHS, WAS THERE A TIME WHEN YOU WERE NOT ABLE TO PAY THE MORTGAGE OR RENT ON TIME?: NO

## 2025-05-19 SDOH — SOCIAL STABILITY: SOCIAL INSECURITY
WITHIN THE LAST YEAR, HAVE YOU BEEN RAPED OR FORCED TO HAVE ANY KIND OF SEXUAL ACTIVITY BY YOUR PARTNER OR EX-PARTNER?: NO

## 2025-05-19 SDOH — SOCIAL STABILITY: SOCIAL NETWORK: HOW OFTEN DO YOU GET TOGETHER WITH FRIENDS OR RELATIVES?: MORE THAN THREE TIMES A WEEK

## 2025-05-19 SDOH — SOCIAL STABILITY: SOCIAL NETWORK: HOW OFTEN DO YOU ATTEND CHURCH OR RELIGIOUS SERVICES?: NEVER

## 2025-05-19 SDOH — ECONOMIC STABILITY: FOOD INSECURITY: HOW HARD IS IT FOR YOU TO PAY FOR THE VERY BASICS LIKE FOOD, HOUSING, MEDICAL CARE, AND HEATING?: NOT HARD AT ALL

## 2025-05-19 SDOH — HEALTH STABILITY: PHYSICAL HEALTH: ON AVERAGE, HOW MANY DAYS PER WEEK DO YOU ENGAGE IN MODERATE TO STRENUOUS EXERCISE (LIKE A BRISK WALK)?: 7 DAYS

## 2025-05-19 SDOH — HEALTH STABILITY: MENTAL HEALTH: HOW MANY DRINKS CONTAINING ALCOHOL DO YOU HAVE ON A TYPICAL DAY WHEN YOU ARE DRINKING?: 1 OR 2

## 2025-05-19 SDOH — HEALTH STABILITY: MENTAL HEALTH
DO YOU FEEL STRESS - TENSE, RESTLESS, NERVOUS, OR ANXIOUS, OR UNABLE TO SLEEP AT NIGHT BECAUSE YOUR MIND IS TROUBLED ALL THE TIME - THESE DAYS?: VERY MUCH

## 2025-05-19 SDOH — ECONOMIC STABILITY: FOOD INSECURITY: WITHIN THE PAST 12 MONTHS, YOU WORRIED THAT YOUR FOOD WOULD RUN OUT BEFORE YOU GOT THE MONEY TO BUY MORE.: NEVER TRUE

## 2025-05-19 SDOH — SOCIAL STABILITY: SOCIAL INSECURITY: WITHIN THE LAST YEAR, HAVE YOU BEEN HUMILIATED OR EMOTIONALLY ABUSED IN OTHER WAYS BY YOUR PARTNER OR EX-PARTNER?: NO

## 2025-05-19 SDOH — HEALTH STABILITY: PHYSICAL HEALTH: ON AVERAGE, HOW MANY MINUTES DO YOU ENGAGE IN EXERCISE AT THIS LEVEL?: 150+ MIN

## 2025-05-19 ASSESSMENT — PATIENT HEALTH QUESTIONNAIRE - PHQ9
3. TROUBLE FALLING OR STAYING ASLEEP: NOT AT ALL
SUM OF ALL RESPONSES TO PHQ QUESTIONS 1-9: 0
SUM OF ALL RESPONSES TO PHQ QUESTIONS 1-9: 0
1. LITTLE INTEREST OR PLEASURE IN DOING THINGS: NOT AT ALL
SUM OF ALL RESPONSES TO PHQ QUESTIONS 1-9: 0
5. POOR APPETITE OR OVEREATING: NOT AT ALL
6. FEELING BAD ABOUT YOURSELF - OR THAT YOU ARE A FAILURE OR HAVE LET YOURSELF OR YOUR FAMILY DOWN: NOT AT ALL
4. FEELING TIRED OR HAVING LITTLE ENERGY: NOT AT ALL
10. IF YOU CHECKED OFF ANY PROBLEMS, HOW DIFFICULT HAVE THESE PROBLEMS MADE IT FOR YOU TO DO YOUR WORK, TAKE CARE OF THINGS AT HOME, OR GET ALONG WITH OTHER PEOPLE: NOT DIFFICULT AT ALL
8. MOVING OR SPEAKING SO SLOWLY THAT OTHER PEOPLE COULD HAVE NOTICED. OR THE OPPOSITE, BEING SO FIGETY OR RESTLESS THAT YOU HAVE BEEN MOVING AROUND A LOT MORE THAN USUAL: NOT AT ALL
7. TROUBLE CONCENTRATING ON THINGS, SUCH AS READING THE NEWSPAPER OR WATCHING TELEVISION: NOT AT ALL
9. THOUGHTS THAT YOU WOULD BE BETTER OFF DEAD, OR OF HURTING YOURSELF: NOT AT ALL
2. FEELING DOWN, DEPRESSED OR HOPELESS: NOT AT ALL
SUM OF ALL RESPONSES TO PHQ QUESTIONS 1-9: 0

## 2025-05-19 ASSESSMENT — ACTIVITIES OF DAILY LIVING (ADL): LACK_OF_TRANSPORTATION: NO

## 2025-05-19 NOTE — PROGRESS NOTES
Have you been to the ER, urgent care clinic since your last visit?  Hospitalized since your last visit?   NO    Have you seen or consulted any other health care providers outside our system since your last visit?   NO     “Have you had a pap smear?”    NO    Date of last Cervical Cancer screen (HPV or PAP): 3/25/2021       “Have you had a colorectal cancer screening such as a colonoscopy/FIT/Cologuard?    NO    No colonoscopy on file  No cologuard on file  No FIT/FOBT on file   No flexible sigmoidoscopy on file          
\"Have you been to the ER, urgent care clinic since your last visit?  Hospitalized since your last visit?\"    NO    “Have you seen or consulted any other health care providers outside of [facility] since your last visit?”    NO     “Have you had a pap smear?”        Date of last Cervical Cancer screen (HPV or PAP): 3/25/2021         “Have you had a colorectal cancer screening such as a colonoscopy/FIT/Cologuard?        No colonoscopy on file  No cologuard on file  No FIT/FOBT on file   No flexible sigmoidoscopy on file         Click Here for Release of Records Request      Chief Complaint   Patient presents with    Anxiety         There were no vitals filed for this visit.    
motor function) (limited exam to video visit)          [] No gaze palsy        [] Abnormal-         Skin:        [x] No significant exanthematous lesions or discoloration noted on facial skin         [] Abnormal-            Psychiatric:       [] Normal Affect [] No Hallucinations        [x] Abnormal- Fatigued, emotional    Other pertinent observable physical exam findings-     Lakeisha Diaz, was evaluated through a synchronous (real-time) audio-video encounter. The patient (or guardian if applicable) is aware that this is a billable service, which includes applicable co-pays. This Virtual Visit was conducted with patient's (and/or legal guardian's) consent. Patient identification was verified, and a caregiver was present when appropriate.   The patient was located at Home: [address] [place] [id number]  Provider was located at Facility (Appt Dept): [address] [place] [id number]  Confirm you are appropriately licensed, registered, or certified to deliver care in the state where the patient is located as indicated above. If you are not or unsure, please re-schedule the visit: Yes, I confirm.       Total time spent on this encounter: Not billed by time    The patient (or guardian, if applicable) and other individuals in attendance with the patient were advised that Artificial Intelligence will be utilized during this visit to record, process the conversation to generate a clinical note, and support improvement of the AI technology. The patient (or guardian, if applicable) and other individuals in attendance at the appointment consented to the use of AI, including the recording.      --AUGUSTO Garber - NP on 5/19/2025 at 12:38 PM    An electronic signature was used to authenticate this note.

## 2025-08-11 ENCOUNTER — HOSPITAL ENCOUNTER (EMERGENCY)
Facility: HOSPITAL | Age: 45
Discharge: HOME | End: 2025-08-11
Attending: EMERGENCY MEDICINE
Payer: COMMERCIAL

## 2025-08-11 VITALS
SYSTOLIC BLOOD PRESSURE: 148 MMHG | RESPIRATION RATE: 12 BRPM | OXYGEN SATURATION: 99 % | BODY MASS INDEX: 27.32 KG/M2 | WEIGHT: 170 LBS | DIASTOLIC BLOOD PRESSURE: 88 MMHG | TEMPERATURE: 97.7 F | HEIGHT: 66 IN | HEART RATE: 95 BPM

## 2025-08-11 DIAGNOSIS — R52 BODY ACHES: Primary | ICD-10-CM

## 2025-08-11 DIAGNOSIS — M79.10 MYALGIA: ICD-10-CM

## 2025-08-11 LAB
ANION GAP SERPL CALC-SCNC: 7 MMOL/L (ref 2–12)
BUN SERPL-MCNC: 16 MG/DL (ref 6–20)
BUN/CREAT SERPL: 14 (ref 12–20)
CALCIUM SERPL-MCNC: 9.2 MG/DL (ref 8.5–10.1)
CHLORIDE SERPL-SCNC: 102 MMOL/L (ref 97–108)
CK SERPL-CCNC: 48 U/L (ref 26–192)
CO2 SERPL-SCNC: 31 MMOL/L (ref 21–32)
CREAT SERPL-MCNC: 1.14 MG/DL (ref 0.55–1.02)
GLUCOSE SERPL-MCNC: 99 MG/DL (ref 65–100)
MAGNESIUM SERPL-MCNC: 2 MG/DL (ref 1.6–2.4)
POTASSIUM SERPL-SCNC: 3.6 MMOL/L (ref 3.5–5.1)
SODIUM SERPL-SCNC: 140 MMOL/L (ref 136–145)

## 2025-08-11 PROCEDURE — 96372 THER/PROPH/DIAG INJ SC/IM: CPT

## 2025-08-11 PROCEDURE — 36415 COLL VENOUS BLD VENIPUNCTURE: CPT

## 2025-08-11 PROCEDURE — 99284 EMERGENCY DEPT VISIT MOD MDM: CPT

## 2025-08-11 PROCEDURE — 82550 ASSAY OF CK (CPK): CPT

## 2025-08-11 PROCEDURE — 6370000000 HC RX 637 (ALT 250 FOR IP): Performed by: EMERGENCY MEDICINE

## 2025-08-11 PROCEDURE — 6360000002 HC RX W HCPCS: Performed by: EMERGENCY MEDICINE

## 2025-08-11 PROCEDURE — 80048 BASIC METABOLIC PNL TOTAL CA: CPT

## 2025-08-11 PROCEDURE — 83735 ASSAY OF MAGNESIUM: CPT

## 2025-08-11 RX ORDER — CYCLOBENZAPRINE HCL 10 MG
10 TABLET ORAL 3 TIMES DAILY PRN
Qty: 21 TABLET | Refills: 0 | Status: SHIPPED | OUTPATIENT
Start: 2025-08-11 | End: 2025-08-21

## 2025-08-11 RX ORDER — KETOROLAC TROMETHAMINE 30 MG/ML
30 INJECTION, SOLUTION INTRAMUSCULAR; INTRAVENOUS
Status: COMPLETED | OUTPATIENT
Start: 2025-08-11 | End: 2025-08-11

## 2025-08-11 RX ORDER — PREDNISONE 20 MG/1
60 TABLET ORAL ONCE
Status: COMPLETED | OUTPATIENT
Start: 2025-08-11 | End: 2025-08-11

## 2025-08-11 RX ORDER — METHYLPREDNISOLONE 4 MG/1
TABLET ORAL
Qty: 1 KIT | Refills: 0 | Status: SHIPPED | OUTPATIENT
Start: 2025-08-11 | End: 2025-08-17

## 2025-08-11 RX ADMIN — KETOROLAC TROMETHAMINE 30 MG: 30 INJECTION, SOLUTION INTRAMUSCULAR at 08:35

## 2025-08-11 RX ADMIN — PREDNISONE 60 MG: 20 TABLET ORAL at 08:34

## 2025-08-11 ASSESSMENT — PAIN DESCRIPTION - LOCATION: LOCATION: GENERALIZED

## 2025-08-11 ASSESSMENT — PAIN SCALES - GENERAL: PAINLEVEL_OUTOF10: 10

## 2025-08-11 ASSESSMENT — PAIN DESCRIPTION - FREQUENCY: FREQUENCY: CONTINUOUS

## 2025-08-11 ASSESSMENT — PAIN - FUNCTIONAL ASSESSMENT
PAIN_FUNCTIONAL_ASSESSMENT: ACTIVITIES ARE NOT PREVENTED
PAIN_FUNCTIONAL_ASSESSMENT: 0-10

## 2025-08-11 ASSESSMENT — LIFESTYLE VARIABLES
HOW MANY STANDARD DRINKS CONTAINING ALCOHOL DO YOU HAVE ON A TYPICAL DAY: 1 OR 2
HOW OFTEN DO YOU HAVE A DRINK CONTAINING ALCOHOL: MONTHLY OR LESS

## 2025-08-22 ENCOUNTER — TELEMEDICINE (OUTPATIENT)
Age: 45
End: 2025-08-22
Payer: COMMERCIAL

## 2025-08-22 DIAGNOSIS — M35.3 POLYMYALGIA: Primary | ICD-10-CM

## 2025-08-22 DIAGNOSIS — F33.9 RECURRENT DEPRESSION: ICD-10-CM

## 2025-08-22 DIAGNOSIS — F43.21 GRIEF: ICD-10-CM

## 2025-08-22 PROCEDURE — 99214 OFFICE O/P EST MOD 30 MIN: CPT | Performed by: NURSE PRACTITIONER

## 2025-08-22 RX ORDER — CELECOXIB 100 MG/1
100 CAPSULE ORAL 2 TIMES DAILY
Qty: 60 CAPSULE | Refills: 2 | Status: SHIPPED | OUTPATIENT
Start: 2025-08-22

## 2025-08-22 RX ORDER — CYCLOBENZAPRINE HCL 10 MG
10 TABLET ORAL 3 TIMES DAILY PRN
Qty: 30 TABLET | Refills: 2 | Status: SHIPPED | OUTPATIENT
Start: 2025-08-22 | End: 2025-09-21

## 2025-08-22 ASSESSMENT — PATIENT HEALTH QUESTIONNAIRE - PHQ9
9. THOUGHTS THAT YOU WOULD BE BETTER OFF DEAD, OR OF HURTING YOURSELF: NOT AT ALL
2. FEELING DOWN, DEPRESSED OR HOPELESS: MORE THAN HALF THE DAYS
6. FEELING BAD ABOUT YOURSELF - OR THAT YOU ARE A FAILURE OR HAVE LET YOURSELF OR YOUR FAMILY DOWN: MORE THAN HALF THE DAYS
7. TROUBLE CONCENTRATING ON THINGS, SUCH AS READING THE NEWSPAPER OR WATCHING TELEVISION: NOT AT ALL
SUM OF ALL RESPONSES TO PHQ QUESTIONS 1-9: 13
SUM OF ALL RESPONSES TO PHQ QUESTIONS 1-9: 13
3. TROUBLE FALLING OR STAYING ASLEEP: NEARLY EVERY DAY
SUM OF ALL RESPONSES TO PHQ QUESTIONS 1-9: 13
5. POOR APPETITE OR OVEREATING: SEVERAL DAYS
SUM OF ALL RESPONSES TO PHQ QUESTIONS 1-9: 13
4. FEELING TIRED OR HAVING LITTLE ENERGY: NEARLY EVERY DAY
10. IF YOU CHECKED OFF ANY PROBLEMS, HOW DIFFICULT HAVE THESE PROBLEMS MADE IT FOR YOU TO DO YOUR WORK, TAKE CARE OF THINGS AT HOME, OR GET ALONG WITH OTHER PEOPLE: VERY DIFFICULT
8. MOVING OR SPEAKING SO SLOWLY THAT OTHER PEOPLE COULD HAVE NOTICED. OR THE OPPOSITE, BEING SO FIGETY OR RESTLESS THAT YOU HAVE BEEN MOVING AROUND A LOT MORE THAN USUAL: SEVERAL DAYS
1. LITTLE INTEREST OR PLEASURE IN DOING THINGS: SEVERAL DAYS